# Patient Record
Sex: MALE | Race: WHITE | NOT HISPANIC OR LATINO | Employment: OTHER | URBAN - METROPOLITAN AREA
[De-identification: names, ages, dates, MRNs, and addresses within clinical notes are randomized per-mention and may not be internally consistent; named-entity substitution may affect disease eponyms.]

---

## 2017-06-06 ENCOUNTER — APPOINTMENT (OUTPATIENT)
Dept: LAB | Age: 77
End: 2017-06-06
Payer: COMMERCIAL

## 2017-06-06 ENCOUNTER — TRANSCRIBE ORDERS (OUTPATIENT)
Dept: ADMINISTRATIVE | Age: 77
End: 2017-06-06

## 2017-06-06 DIAGNOSIS — C61 MALIGNANT NEOPLASM OF PROSTATE (HCC): Primary | ICD-10-CM

## 2017-06-06 DIAGNOSIS — C61 MALIGNANT NEOPLASM OF PROSTATE (HCC): ICD-10-CM

## 2017-06-06 LAB — PSA SERPL-MCNC: <0.1 NG/ML (ref 0–4)

## 2017-06-06 PROCEDURE — 84153 ASSAY OF PSA TOTAL: CPT

## 2017-06-20 ENCOUNTER — ALLSCRIPTS OFFICE VISIT (OUTPATIENT)
Dept: OTHER | Facility: OTHER | Age: 77
End: 2017-06-20

## 2017-06-20 DIAGNOSIS — E78.00 PURE HYPERCHOLESTEROLEMIA: ICD-10-CM

## 2017-06-20 DIAGNOSIS — I10 ESSENTIAL (PRIMARY) HYPERTENSION: ICD-10-CM

## 2017-12-05 ENCOUNTER — TRANSCRIBE ORDERS (OUTPATIENT)
Dept: ADMINISTRATIVE | Age: 77
End: 2017-12-05

## 2017-12-05 ENCOUNTER — APPOINTMENT (OUTPATIENT)
Dept: LAB | Age: 77
End: 2017-12-05
Payer: COMMERCIAL

## 2017-12-05 DIAGNOSIS — E78.00 PURE HYPERCHOLESTEROLEMIA: ICD-10-CM

## 2017-12-05 DIAGNOSIS — I10 ESSENTIAL (PRIMARY) HYPERTENSION: ICD-10-CM

## 2017-12-05 LAB
BASOPHILS # BLD AUTO: 0.04 THOUSANDS/ΜL (ref 0–0.1)
BASOPHILS NFR BLD AUTO: 0 % (ref 0–1)
CHOLEST SERPL-MCNC: 162 MG/DL (ref 50–200)
EOSINOPHIL # BLD AUTO: 0.59 THOUSAND/ΜL (ref 0–0.61)
EOSINOPHIL NFR BLD AUTO: 5 % (ref 0–6)
ERYTHROCYTE [DISTWIDTH] IN BLOOD BY AUTOMATED COUNT: 14 % (ref 11.6–15.1)
HCT VFR BLD AUTO: 46.2 % (ref 36.5–49.3)
HDLC SERPL-MCNC: 46 MG/DL (ref 40–60)
HGB BLD-MCNC: 15.5 G/DL (ref 12–17)
LDLC SERPL CALC-MCNC: 80 MG/DL (ref 0–100)
LYMPHOCYTES # BLD AUTO: 2.6 THOUSANDS/ΜL (ref 0.6–4.47)
LYMPHOCYTES NFR BLD AUTO: 23 % (ref 14–44)
MCH RBC QN AUTO: 30.8 PG (ref 26.8–34.3)
MCHC RBC AUTO-ENTMCNC: 33.5 G/DL (ref 31.4–37.4)
MCV RBC AUTO: 92 FL (ref 82–98)
MONOCYTES # BLD AUTO: 0.91 THOUSAND/ΜL (ref 0.17–1.22)
MONOCYTES NFR BLD AUTO: 8 % (ref 4–12)
NEUTROPHILS # BLD AUTO: 7.06 THOUSANDS/ΜL (ref 1.85–7.62)
NEUTS SEG NFR BLD AUTO: 64 % (ref 43–75)
NRBC BLD AUTO-RTO: 1 /100 WBCS
PLATELET # BLD AUTO: 171 THOUSANDS/UL (ref 149–390)
PMV BLD AUTO: 11 FL (ref 8.9–12.7)
RBC # BLD AUTO: 5.04 MILLION/UL (ref 3.88–5.62)
TRIGL SERPL-MCNC: 181 MG/DL
WBC # BLD AUTO: 11.27 THOUSAND/UL (ref 4.31–10.16)

## 2017-12-05 PROCEDURE — 85025 COMPLETE CBC W/AUTO DIFF WBC: CPT

## 2017-12-05 PROCEDURE — 36415 COLL VENOUS BLD VENIPUNCTURE: CPT

## 2017-12-05 PROCEDURE — 80061 LIPID PANEL: CPT

## 2017-12-18 ENCOUNTER — ALLSCRIPTS OFFICE VISIT (OUTPATIENT)
Dept: OTHER | Facility: OTHER | Age: 77
End: 2017-12-18

## 2018-01-13 VITALS
WEIGHT: 162 LBS | DIASTOLIC BLOOD PRESSURE: 82 MMHG | HEIGHT: 65 IN | HEART RATE: 74 BPM | SYSTOLIC BLOOD PRESSURE: 140 MMHG | BODY MASS INDEX: 26.99 KG/M2 | TEMPERATURE: 98.1 F | RESPIRATION RATE: 14 BRPM | OXYGEN SATURATION: 98 %

## 2018-01-22 VITALS
SYSTOLIC BLOOD PRESSURE: 142 MMHG | WEIGHT: 161 LBS | HEART RATE: 78 BPM | TEMPERATURE: 98 F | HEIGHT: 65 IN | BODY MASS INDEX: 26.82 KG/M2 | OXYGEN SATURATION: 96 % | DIASTOLIC BLOOD PRESSURE: 78 MMHG

## 2018-06-12 ENCOUNTER — APPOINTMENT (OUTPATIENT)
Dept: LAB | Age: 78
End: 2018-06-12
Payer: COMMERCIAL

## 2018-06-12 ENCOUNTER — TRANSCRIBE ORDERS (OUTPATIENT)
Dept: ADMINISTRATIVE | Age: 78
End: 2018-06-12

## 2018-06-12 DIAGNOSIS — C61 MALIGNANT NEOPLASM OF PROSTATE (HCC): Primary | ICD-10-CM

## 2018-06-12 DIAGNOSIS — C61 MALIGNANT NEOPLASM OF PROSTATE (HCC): ICD-10-CM

## 2018-06-12 LAB — PSA SERPL-MCNC: <0.1 NG/ML (ref 0–4)

## 2018-06-12 PROCEDURE — 84153 ASSAY OF PSA TOTAL: CPT

## 2018-06-25 ENCOUNTER — OFFICE VISIT (OUTPATIENT)
Dept: FAMILY MEDICINE CLINIC | Facility: CLINIC | Age: 78
End: 2018-06-25
Payer: COMMERCIAL

## 2018-06-25 VITALS
HEIGHT: 65 IN | SYSTOLIC BLOOD PRESSURE: 142 MMHG | TEMPERATURE: 98.4 F | BODY MASS INDEX: 27.46 KG/M2 | HEART RATE: 71 BPM | WEIGHT: 164.8 LBS | DIASTOLIC BLOOD PRESSURE: 78 MMHG | OXYGEN SATURATION: 98 %

## 2018-06-25 DIAGNOSIS — E55.9 VITAMIN D DEFICIENCY: ICD-10-CM

## 2018-06-25 DIAGNOSIS — E78.00 HYPERCHOLESTEREMIA: ICD-10-CM

## 2018-06-25 DIAGNOSIS — I10 ESSENTIAL HYPERTENSION: Primary | ICD-10-CM

## 2018-06-25 DIAGNOSIS — C61 PROSTATE CANCER (HCC): ICD-10-CM

## 2018-06-25 DIAGNOSIS — I10 ESSENTIAL HYPERTENSION: ICD-10-CM

## 2018-06-25 PROCEDURE — 99214 OFFICE O/P EST MOD 30 MIN: CPT | Performed by: FAMILY MEDICINE

## 2018-06-25 PROCEDURE — 3008F BODY MASS INDEX DOCD: CPT | Performed by: FAMILY MEDICINE

## 2018-06-25 PROCEDURE — 1101F PT FALLS ASSESS-DOCD LE1/YR: CPT | Performed by: FAMILY MEDICINE

## 2018-06-25 PROCEDURE — 1160F RVW MEDS BY RX/DR IN RCRD: CPT | Performed by: FAMILY MEDICINE

## 2018-06-25 RX ORDER — ATENOLOL 25 MG/1
12.5 TABLET ORAL DAILY
Qty: 45 TABLET | Refills: 1 | Status: SHIPPED | OUTPATIENT
Start: 2018-06-25 | End: 2018-06-25 | Stop reason: SDUPTHER

## 2018-06-25 RX ORDER — ATENOLOL 25 MG/1
12.5 TABLET ORAL DAILY
Qty: 90 TABLET | Refills: 0 | Status: SHIPPED | OUTPATIENT
Start: 2018-06-25 | End: 2018-12-19 | Stop reason: SDUPTHER

## 2018-06-25 RX ORDER — LOSARTAN POTASSIUM 50 MG/1
50 TABLET ORAL DAILY
Qty: 90 TABLET | Refills: 1 | Status: SHIPPED | OUTPATIENT
Start: 2018-06-25 | End: 2018-12-19 | Stop reason: SDUPTHER

## 2018-06-25 RX ORDER — LOSARTAN POTASSIUM 50 MG/1
50 TABLET ORAL DAILY
Qty: 90 TABLET | Refills: 1 | Status: SHIPPED | OUTPATIENT
Start: 2018-06-25 | End: 2018-06-25 | Stop reason: SDUPTHER

## 2018-06-25 NOTE — PATIENT INSTRUCTIONS
Fasting labs before next visit  Discussed activity level and hydration  Encouraged patient on writing his book

## 2018-06-25 NOTE — PROGRESS NOTES
Assessment/Plan: patient here today for follow up for hypertension   Pt would like to talk about aspirin     No problem-specific Assessment & Plan notes found for this encounter  Diagnoses and all orders for this visit:    Essential hypertension  -     losartan (COZAAR) 50 mg tablet; Take 1 tablet (50 mg total) by mouth daily  -     atenolol (TENORMIN) 25 mg tablet; Take 0 5 tablets (12 5 mg total) by mouth daily  -     Basic metabolic panel; Future  -     CBC and Platelet; Future  -     Hepatic function panel; Future    Hypercholesteremia  -     Lipid panel; Future    Vitamin D deficiency  -     Vitamin D 25 hydroxy; Future    Prostate cancer ([facility] [address] )    Other orders  -     aspirin 81 MG tablet; Take by mouth            Subjective:      Patient ID: Karolina Neil is a 77 y o  male  Follow up  No complaints  Seeing Urology this week  The following portions of the patient's history were reviewed and updated as appropriate: allergies, current medications, past family history, past medical history, past social history, past surgical history and problem list     Review of Systems   Constitutional: Negative  HENT: Negative  Eyes: Negative  Respiratory: Negative  Cardiovascular: Negative  Gastrointestinal: Negative  Genitourinary: Negative  Musculoskeletal: Negative  Skin: Negative  Neurological: Negative  Psychiatric/Behavioral: Negative  Objective:      /78 (BP Location: Left arm, Patient Position: Sitting, Cuff Size: Standard)   Pulse 71   Temp 98 4 °F (36 9 °C) (Oral)   Ht 5' 5" (1 651 m)   Wt 74 8 kg (164 lb 12 8 oz)   SpO2 98%   BMI 27 42 kg/m²          Physical Exam   Constitutional: He is oriented to person, place, and time  He appears well-developed and well-nourished  HENT:   Head: Normocephalic and atraumatic     Right Ear: External ear normal    Left Ear: External ear normal    Nose: Nose normal    Mouth/Throat: Oropharynx is clear and moist  Eyes: Conjunctivae and EOM are normal  Pupils are equal, round, and reactive to light  Neck: Normal range of motion  Neck supple  Cardiovascular: Normal rate, regular rhythm, normal heart sounds and intact distal pulses  Pulmonary/Chest: Effort normal and breath sounds normal    Abdominal: Soft  Bowel sounds are normal    Neurological: He is alert and oriented to person, place, and time  He has normal reflexes  Skin: Skin is warm and dry  Psychiatric: He has a normal mood and affect   His behavior is normal  Judgment and thought content normal

## 2018-06-27 ENCOUNTER — OFFICE VISIT (OUTPATIENT)
Dept: UROLOGY | Facility: CLINIC | Age: 78
End: 2018-06-27
Payer: COMMERCIAL

## 2018-06-27 VITALS
DIASTOLIC BLOOD PRESSURE: 66 MMHG | HEIGHT: 65 IN | BODY MASS INDEX: 27.32 KG/M2 | WEIGHT: 164 LBS | SYSTOLIC BLOOD PRESSURE: 126 MMHG

## 2018-06-27 DIAGNOSIS — Z85.46 PERSONAL HISTORY OF MALIGNANT NEOPLASM OF PROSTATE: Primary | ICD-10-CM

## 2018-06-27 LAB
SL AMB  POCT GLUCOSE, UA: NORMAL
SL AMB LEUKOCYTE ESTERASE,UA: NORMAL
SL AMB POCT BILIRUBIN,UA: NORMAL
SL AMB POCT BLOOD,UA: NORMAL
SL AMB POCT CLARITY,UA: CLEAR
SL AMB POCT COLOR,UA: YELLOW
SL AMB POCT KETONES,UA: NORMAL
SL AMB POCT NITRITE,UA: NORMAL
SL AMB POCT PH,UA: 5
SL AMB POCT SPECIFIC GRAVITY,UA: NORMAL
SL AMB POCT URINE PROTEIN: NORMAL
SL AMB POCT UROBILINOGEN: NORMAL

## 2018-06-27 PROCEDURE — 99213 OFFICE O/P EST LOW 20 MIN: CPT | Performed by: PHYSICIAN ASSISTANT

## 2018-06-27 PROCEDURE — 81002 URINALYSIS NONAUTO W/O SCOPE: CPT | Performed by: PHYSICIAN ASSISTANT

## 2018-06-27 NOTE — PROGRESS NOTES
UROLOGY PROGRESS NOTE   Patient Identifiers: Shayla Garcia (MRN [id number])  Date of Service: 6/27/2018    Subjective:      50-year-old male with history of Christ 7 stage II prostate cancer  He had a DA Matthew prostatectomy in 2014  His PSA  Is < 0 1  He has  good urinary control but still wears a pad for protection  Patient has  no complaints  Objective:     VITALS:      AUA SYMPTOM SCORE      Most Recent Value   AUA SYMPTOM SCORE   How often have you had a sensation of not emptying your bladder completely after you finished urinating? 0   How often have you had to urinate again less than two hours after you finished urinating? 0   How often have you found you stopped and started again several times when you urinate?  0   How often have you found it difficult to postpone urination? 0   How often have you had a weak urinary stream?  0   How often have you had to push or strain to begin urination?   0   How many times did you most typically get up to urinate from the time you went to bed at night until the time you got up in the morning?  0   Quality of Life: If you were to spend the rest of your life with your urinary condition just the way it is now, how would you feel about that?  --   AUA SYMPTOM SCORE  0            LABS:  Lab Results   Component Value Date    HGB 15 5 12/05/2017    HCT 46 2 12/05/2017    WBC 11 27 (H) 12/05/2017     12/05/2017   ]    Lab Results   Component Value Date     12/09/2016    K 4 1 12/09/2016    CL 99 12/09/2016    CO2 27 12/09/2016    BUN 24 12/09/2016    CREATININE 1 10 12/09/2016    CALCIUM 9 0 12/09/2016    GLUCOSE 84 12/04/2016   ]    INPATIENT MEDS:    Current Outpatient Prescriptions:     aspirin 81 MG tablet, Take by mouth  , Disp: , Rfl:     atenolol (TENORMIN) 25 mg tablet, Take 0 5 tablets (12 5 mg total) by mouth daily, Disp: 90 tablet, Rfl: 0    bimatoprost (LUMIGAN) 0 01 % ophthalmic drops, Administer 1 drop to both eyes daily at bedtime, Disp: , Rfl:     brinzolamide (AZOPT) 1 % ophthalmic suspension, Administer 1 drop to both eyes 2 (two) times a day, Disp: , Rfl:     losartan (COZAAR) 50 mg tablet, Take 1 tablet (50 mg total) by mouth daily, Disp: 90 tablet, Rfl: 1    multivitamin (THERAGRAN) TABS, Take 1 tablet by mouth daily, Disp: , Rfl:     simvastatin (ZOCOR) 20 mg tablet, Take 20 mg by mouth daily at bedtime, Disp: , Rfl:       Physical Exam:   There were no vitals taken for this visit  GEN: no acute distress    RESP: breathing comfortably with no accessory muscle use    ABD: soft, non-tender, non-distended   INCISION:    EXT: no significant peripheral edema   (Male): Penis circumcised, phallus normal, meatus patent  Testicles descended into scrotum bilaterally without masses nor tenderness  No inguinal hernias bilaterally  ELISHA: Prostate is   Surgically absent with no rectal masses  RADIOLOGY:    none     Assessment:    1  Prostate cancer     Plan:   - no evidence of recurrent disease   - follow-up  In 1 year with a PSA prior to visit    -  -

## 2018-07-30 RX ORDER — SIMVASTATIN 20 MG
TABLET ORAL
Qty: 90 TABLET | Refills: 1 | OUTPATIENT
Start: 2018-07-30

## 2018-10-29 DIAGNOSIS — E78.00 HYPERCHOLESTEREMIA: Primary | ICD-10-CM

## 2018-10-29 RX ORDER — SIMVASTATIN 20 MG
20 TABLET ORAL
Qty: 90 TABLET | Refills: 0 | Status: SHIPPED | OUTPATIENT
Start: 2018-10-29 | End: 2018-12-19 | Stop reason: SDUPTHER

## 2018-12-04 ENCOUNTER — APPOINTMENT (OUTPATIENT)
Dept: LAB | Age: 78
End: 2018-12-04
Payer: COMMERCIAL

## 2018-12-04 ENCOUNTER — TRANSCRIBE ORDERS (OUTPATIENT)
Dept: ADMINISTRATIVE | Age: 78
End: 2018-12-04

## 2018-12-04 DIAGNOSIS — E55.9 VITAMIN D DEFICIENCY: ICD-10-CM

## 2018-12-04 DIAGNOSIS — E78.00 HYPERCHOLESTEREMIA: ICD-10-CM

## 2018-12-04 DIAGNOSIS — I10 ESSENTIAL HYPERTENSION: ICD-10-CM

## 2018-12-04 LAB
25(OH)D3 SERPL-MCNC: 26.2 NG/ML (ref 30–100)
ALBUMIN SERPL BCP-MCNC: 3.9 G/DL (ref 3.5–5)
ALP SERPL-CCNC: 80 U/L (ref 46–116)
ALT SERPL W P-5'-P-CCNC: 32 U/L (ref 12–78)
ANION GAP SERPL CALCULATED.3IONS-SCNC: 4 MMOL/L (ref 4–13)
AST SERPL W P-5'-P-CCNC: 24 U/L (ref 5–45)
BILIRUB DIRECT SERPL-MCNC: 0.17 MG/DL (ref 0–0.2)
BILIRUB SERPL-MCNC: 0.58 MG/DL (ref 0.2–1)
BUN SERPL-MCNC: 22 MG/DL (ref 5–25)
CALCIUM SERPL-MCNC: 9.5 MG/DL (ref 8.3–10.1)
CHLORIDE SERPL-SCNC: 103 MMOL/L (ref 100–108)
CHOLEST SERPL-MCNC: 175 MG/DL (ref 50–200)
CO2 SERPL-SCNC: 31 MMOL/L (ref 21–32)
CREAT SERPL-MCNC: 1.09 MG/DL (ref 0.6–1.3)
ERYTHROCYTE [DISTWIDTH] IN BLOOD BY AUTOMATED COUNT: 13.6 % (ref 11.6–15.1)
GFR SERPL CREATININE-BSD FRML MDRD: 64 ML/MIN/1.73SQ M
GLUCOSE P FAST SERPL-MCNC: 96 MG/DL (ref 65–99)
HCT VFR BLD AUTO: 49.3 % (ref 36.5–49.3)
HDLC SERPL-MCNC: 42 MG/DL (ref 40–60)
HGB BLD-MCNC: 15.9 G/DL (ref 12–17)
LDLC SERPL CALC-MCNC: 86 MG/DL (ref 0–100)
MCH RBC QN AUTO: 30.1 PG (ref 26.8–34.3)
MCHC RBC AUTO-ENTMCNC: 32.3 G/DL (ref 31.4–37.4)
MCV RBC AUTO: 93 FL (ref 82–98)
NONHDLC SERPL-MCNC: 133 MG/DL
PLATELET # BLD AUTO: 179 THOUSANDS/UL (ref 149–390)
PMV BLD AUTO: 10.3 FL (ref 8.9–12.7)
POTASSIUM SERPL-SCNC: 4.2 MMOL/L (ref 3.5–5.3)
PROT SERPL-MCNC: 7.6 G/DL (ref 6.4–8.2)
RBC # BLD AUTO: 5.29 MILLION/UL (ref 3.88–5.62)
SODIUM SERPL-SCNC: 138 MMOL/L (ref 136–145)
TRIGL SERPL-MCNC: 234 MG/DL
WBC # BLD AUTO: 11.06 THOUSAND/UL (ref 4.31–10.16)

## 2018-12-04 PROCEDURE — 36415 COLL VENOUS BLD VENIPUNCTURE: CPT

## 2018-12-04 PROCEDURE — 80076 HEPATIC FUNCTION PANEL: CPT

## 2018-12-04 PROCEDURE — 82306 VITAMIN D 25 HYDROXY: CPT

## 2018-12-04 PROCEDURE — 80061 LIPID PANEL: CPT

## 2018-12-04 PROCEDURE — 85027 COMPLETE CBC AUTOMATED: CPT

## 2018-12-04 PROCEDURE — 80048 BASIC METABOLIC PNL TOTAL CA: CPT

## 2018-12-19 ENCOUNTER — OFFICE VISIT (OUTPATIENT)
Dept: FAMILY MEDICINE CLINIC | Facility: CLINIC | Age: 78
End: 2018-12-19
Payer: COMMERCIAL

## 2018-12-19 VITALS
DIASTOLIC BLOOD PRESSURE: 74 MMHG | BODY MASS INDEX: 27.46 KG/M2 | TEMPERATURE: 97.9 F | SYSTOLIC BLOOD PRESSURE: 126 MMHG | HEIGHT: 65 IN | WEIGHT: 164.8 LBS | HEART RATE: 79 BPM | OXYGEN SATURATION: 96 %

## 2018-12-19 DIAGNOSIS — I10 ESSENTIAL HYPERTENSION: ICD-10-CM

## 2018-12-19 DIAGNOSIS — E55.9 VITAMIN D DEFICIENCY: ICD-10-CM

## 2018-12-19 DIAGNOSIS — E78.1 HYPERTRIGLYCERIDEMIA: Primary | ICD-10-CM

## 2018-12-19 DIAGNOSIS — E78.00 HYPERCHOLESTEREMIA: ICD-10-CM

## 2018-12-19 DIAGNOSIS — C61 PROSTATE CANCER (HCC): ICD-10-CM

## 2018-12-19 PROCEDURE — 99214 OFFICE O/P EST MOD 30 MIN: CPT | Performed by: FAMILY MEDICINE

## 2018-12-19 RX ORDER — LOSARTAN POTASSIUM 50 MG/1
50 TABLET ORAL DAILY
Qty: 90 TABLET | Refills: 1 | Status: SHIPPED | OUTPATIENT
Start: 2018-12-19 | End: 2019-01-30 | Stop reason: SDUPTHER

## 2018-12-19 RX ORDER — ATENOLOL 25 MG/1
12.5 TABLET ORAL DAILY
Qty: 90 TABLET | Refills: 1 | Status: SHIPPED | OUTPATIENT
Start: 2018-12-19 | End: 2019-01-30 | Stop reason: SDUPTHER

## 2018-12-19 RX ORDER — SIMVASTATIN 20 MG
20 TABLET ORAL
Qty: 90 TABLET | Refills: 1 | Status: SHIPPED | OUTPATIENT
Start: 2018-12-19 | End: 2019-01-30 | Stop reason: SDUPTHER

## 2018-12-19 NOTE — PROGRESS NOTES
Assessment/Plan: patient here today for follow up for hypertension and to review BW    Pt will like to talk about pneumonia vaccine     No problem-specific Assessment & Plan notes found for this encounter  Diagnoses and all orders for this visit:    Hypertriglyceridemia    Hypercholesteremia  -     simvastatin (ZOCOR) 20 mg tablet; Take 1 tablet (20 mg total) by mouth daily at bedtime for 180 days    Essential hypertension  -     losartan (COZAAR) 50 mg tablet; Take 1 tablet (50 mg total) by mouth daily for 180 days  -     atenolol (TENORMIN) 25 mg tablet; Take 0 5 tablets (12 5 mg total) by mouth daily for 180 days    Prostate cancer ([facility] [address] )    Vitamin D deficiency          Subjective:      Patient ID: Tiago Carolina is a 77 y o  male  Follow up  Doing well  [facility]  No new skin lesions, follows with plastics yearly  Urination good  PSA <1  Follows with urology yearly          The following portions of the patient's history were reviewed and updated as appropriate: allergies, current medications, past family history, past medical history, past social history, past surgical history and problem list     Current Outpatient Prescriptions:     aspirin 81 MG tablet, Take by mouth  , Disp: , Rfl:     atenolol (TENORMIN) 25 mg tablet, Take 0 5 tablets (12 5 mg total) by mouth daily for 180 days, Disp: 90 tablet, Rfl: 1    bimatoprost (LUMIGAN) 0 01 % ophthalmic drops, Administer 1 drop to both eyes daily at bedtime, Disp: , Rfl:     brinzolamide (AZOPT) 1 % ophthalmic suspension, Administer 1 drop to both eyes 2 (two) times a day, Disp: , Rfl:     losartan (COZAAR) 50 mg tablet, Take 1 tablet (50 mg total) by mouth daily for 180 days, Disp: 90 tablet, Rfl: 1    multivitamin (THERAGRAN) TABS, Take 1 tablet by mouth daily, Disp: , Rfl:     simvastatin (ZOCOR) 20 mg tablet, Take 1 tablet (20 mg total) by mouth daily at bedtime for 180 days, Disp: 90 tablet, Rfl: 1     Review of Systems Constitutional: Negative  HENT: Negative  Eyes: Negative  Respiratory: Negative  Cardiovascular: Negative  Gastrointestinal: Negative  Genitourinary: Negative  Musculoskeletal: Negative  Skin: Negative  Neurological: Negative  Psychiatric/Behavioral: Negative  Objective:      /74 (BP Location: Left arm, Patient Position: Sitting, Cuff Size: Standard)   Pulse 79   Temp 97 9 °F (36 6 °C) (Oral)   Ht 5' 5" (1 651 m)   Wt 74 8 kg (164 lb 12 8 oz)   SpO2 96%   BMI 27 42 kg/m²          Physical Exam   Constitutional: He is oriented to person, place, and time  He appears well-developed and well-nourished  HENT:   Head: Normocephalic and atraumatic  Right Ear: External ear normal    Left Ear: External ear normal    Nose: Nose normal    Mouth/Throat: Oropharynx is clear and moist    Eyes: Pupils are equal, round, and reactive to light  Conjunctivae and EOM are normal    Neck: Normal range of motion  Neck supple  Cardiovascular: Normal rate, regular rhythm, normal heart sounds and intact distal pulses  Pulmonary/Chest: Effort normal and breath sounds normal    Abdominal: Soft  Bowel sounds are normal    Neurological: He is alert and oriented to person, place, and time  Skin: Skin is warm and dry  Psychiatric: He has a normal mood and affect   His behavior is normal  Judgment and thought content normal

## 2019-01-27 DIAGNOSIS — E78.00 HYPERCHOLESTEREMIA: ICD-10-CM

## 2019-01-28 RX ORDER — SIMVASTATIN 20 MG
TABLET ORAL
Qty: 90 TABLET | Refills: 0 | OUTPATIENT
Start: 2019-01-28

## 2019-01-30 DIAGNOSIS — I10 ESSENTIAL HYPERTENSION: ICD-10-CM

## 2019-01-30 DIAGNOSIS — E78.00 HYPERCHOLESTEREMIA: ICD-10-CM

## 2019-01-30 RX ORDER — ATENOLOL 25 MG/1
12.5 TABLET ORAL DAILY
Qty: 45 TABLET | Refills: 1 | Status: SHIPPED | OUTPATIENT
Start: 2019-01-30 | End: 2019-10-15 | Stop reason: SDUPTHER

## 2019-01-30 RX ORDER — LOSARTAN POTASSIUM 50 MG/1
50 TABLET ORAL DAILY
Qty: 90 TABLET | Refills: 1 | Status: SHIPPED | OUTPATIENT
Start: 2019-01-30 | End: 2019-10-15 | Stop reason: ALTCHOICE

## 2019-01-30 RX ORDER — SIMVASTATIN 20 MG
20 TABLET ORAL
Qty: 90 TABLET | Refills: 1 | Status: SHIPPED | OUTPATIENT
Start: 2019-01-30 | End: 2019-06-10

## 2019-04-01 ENCOUNTER — OFFICE VISIT (OUTPATIENT)
Dept: FAMILY MEDICINE CLINIC | Facility: CLINIC | Age: 79
End: 2019-04-01
Payer: COMMERCIAL

## 2019-04-01 VITALS
HEIGHT: 65 IN | TEMPERATURE: 97.7 F | SYSTOLIC BLOOD PRESSURE: 118 MMHG | WEIGHT: 168.5 LBS | RESPIRATION RATE: 16 BRPM | DIASTOLIC BLOOD PRESSURE: 60 MMHG | OXYGEN SATURATION: 98 % | BODY MASS INDEX: 28.07 KG/M2 | HEART RATE: 79 BPM

## 2019-04-01 DIAGNOSIS — L85.3 DRY SKIN: ICD-10-CM

## 2019-04-01 DIAGNOSIS — L85.3 DRY SKIN DERMATITIS: Primary | ICD-10-CM

## 2019-04-01 DIAGNOSIS — T14.8XXA SKIN ABRASION: ICD-10-CM

## 2019-04-01 PROCEDURE — 1036F TOBACCO NON-USER: CPT | Performed by: FAMILY MEDICINE

## 2019-04-01 PROCEDURE — 1160F RVW MEDS BY RX/DR IN RCRD: CPT | Performed by: FAMILY MEDICINE

## 2019-04-01 PROCEDURE — 99214 OFFICE O/P EST MOD 30 MIN: CPT | Performed by: FAMILY MEDICINE

## 2019-04-01 RX ORDER — TRIAMCINOLONE ACETONIDE 5 MG/G
CREAM TOPICAL 3 TIMES DAILY
Qty: 30 G | Refills: 1 | Status: SHIPPED | OUTPATIENT
Start: 2019-04-01 | End: 2019-10-08 | Stop reason: ALTCHOICE

## 2019-05-20 ENCOUNTER — OFFICE VISIT (OUTPATIENT)
Dept: FAMILY MEDICINE CLINIC | Facility: CLINIC | Age: 79
End: 2019-05-20
Payer: COMMERCIAL

## 2019-05-20 VITALS
TEMPERATURE: 98 F | BODY MASS INDEX: 28.66 KG/M2 | HEIGHT: 65 IN | WEIGHT: 172 LBS | SYSTOLIC BLOOD PRESSURE: 124 MMHG | OXYGEN SATURATION: 98 % | HEART RATE: 73 BPM | DIASTOLIC BLOOD PRESSURE: 76 MMHG

## 2019-05-20 DIAGNOSIS — I10 ESSENTIAL HYPERTENSION: ICD-10-CM

## 2019-05-20 DIAGNOSIS — T14.8XXA SKIN ABRASION: ICD-10-CM

## 2019-05-20 DIAGNOSIS — L03.115 CELLULITIS OF RIGHT LOWER EXTREMITY: ICD-10-CM

## 2019-05-20 DIAGNOSIS — T14.8XXA ABRASION: Primary | ICD-10-CM

## 2019-05-20 PROCEDURE — 99213 OFFICE O/P EST LOW 20 MIN: CPT | Performed by: FAMILY MEDICINE

## 2019-05-20 RX ORDER — CEPHALEXIN 250 MG/1
250 CAPSULE ORAL 4 TIMES DAILY
Qty: 28 CAPSULE | Refills: 0 | Status: SHIPPED | OUTPATIENT
Start: 2019-05-20 | End: 2019-05-28

## 2019-05-28 ENCOUNTER — OFFICE VISIT (OUTPATIENT)
Dept: FAMILY MEDICINE CLINIC | Facility: CLINIC | Age: 79
End: 2019-05-28
Payer: COMMERCIAL

## 2019-05-28 VITALS
WEIGHT: 168.8 LBS | TEMPERATURE: 97.9 F | BODY MASS INDEX: 28.12 KG/M2 | SYSTOLIC BLOOD PRESSURE: 126 MMHG | OXYGEN SATURATION: 98 % | HEIGHT: 65 IN | HEART RATE: 65 BPM | DIASTOLIC BLOOD PRESSURE: 76 MMHG

## 2019-05-28 DIAGNOSIS — I10 ESSENTIAL HYPERTENSION: ICD-10-CM

## 2019-05-28 DIAGNOSIS — S90.511D ABRASION OF RIGHT ANKLE, SUBSEQUENT ENCOUNTER: Primary | ICD-10-CM

## 2019-05-28 DIAGNOSIS — E78.00 HYPERCHOLESTEREMIA: ICD-10-CM

## 2019-05-28 DIAGNOSIS — L03.119 CELLULITIS OF LOWER LEG: ICD-10-CM

## 2019-05-28 PROCEDURE — 99214 OFFICE O/P EST MOD 30 MIN: CPT | Performed by: FAMILY MEDICINE

## 2019-05-28 PROCEDURE — 3074F SYST BP LT 130 MM HG: CPT | Performed by: FAMILY MEDICINE

## 2019-05-28 PROCEDURE — 3078F DIAST BP <80 MM HG: CPT | Performed by: FAMILY MEDICINE

## 2019-05-28 RX ORDER — TRAMADOL HYDROCHLORIDE 50 MG/1
50 TABLET ORAL EVERY 8 HOURS PRN
Qty: 30 TABLET | Refills: 0 | Status: SHIPPED | OUTPATIENT
Start: 2019-05-28 | End: 2019-07-29 | Stop reason: SDUPTHER

## 2019-06-10 ENCOUNTER — OFFICE VISIT (OUTPATIENT)
Dept: FAMILY MEDICINE CLINIC | Facility: CLINIC | Age: 79
End: 2019-06-10
Payer: COMMERCIAL

## 2019-06-10 ENCOUNTER — APPOINTMENT (OUTPATIENT)
Dept: LAB | Facility: CLINIC | Age: 79
End: 2019-06-10
Payer: COMMERCIAL

## 2019-06-10 ENCOUNTER — TRANSCRIBE ORDERS (OUTPATIENT)
Dept: LAB | Facility: CLINIC | Age: 79
End: 2019-06-10

## 2019-06-10 VITALS
HEIGHT: 65 IN | HEART RATE: 64 BPM | TEMPERATURE: 97.8 F | OXYGEN SATURATION: 98 % | DIASTOLIC BLOOD PRESSURE: 74 MMHG | SYSTOLIC BLOOD PRESSURE: 126 MMHG | BODY MASS INDEX: 27.92 KG/M2 | WEIGHT: 167.6 LBS

## 2019-06-10 DIAGNOSIS — M79.671 PAIN IN BOTH FEET: ICD-10-CM

## 2019-06-10 DIAGNOSIS — Z85.46 PERSONAL HISTORY OF MALIGNANT NEOPLASM OF PROSTATE: ICD-10-CM

## 2019-06-10 DIAGNOSIS — S90.511D ABRASION OF RIGHT ANKLE, SUBSEQUENT ENCOUNTER: Primary | ICD-10-CM

## 2019-06-10 DIAGNOSIS — L85.3 DRY SKIN: ICD-10-CM

## 2019-06-10 DIAGNOSIS — M79.672 PAIN IN BOTH FEET: ICD-10-CM

## 2019-06-10 LAB — PSA SERPL-MCNC: <0.1 NG/ML (ref 0–4)

## 2019-06-10 PROCEDURE — 1160F RVW MEDS BY RX/DR IN RCRD: CPT | Performed by: FAMILY MEDICINE

## 2019-06-10 PROCEDURE — 99213 OFFICE O/P EST LOW 20 MIN: CPT | Performed by: FAMILY MEDICINE

## 2019-06-10 PROCEDURE — 84153 ASSAY OF PSA TOTAL: CPT

## 2019-06-10 RX ORDER — SIMVASTATIN 20 MG
20 TABLET ORAL
COMMUNITY
End: 2019-07-31 | Stop reason: SDUPTHER

## 2019-06-27 ENCOUNTER — OFFICE VISIT (OUTPATIENT)
Dept: UROLOGY | Facility: CLINIC | Age: 79
End: 2019-06-27
Payer: COMMERCIAL

## 2019-06-27 VITALS
HEIGHT: 65 IN | DIASTOLIC BLOOD PRESSURE: 68 MMHG | WEIGHT: 165 LBS | HEART RATE: 72 BPM | SYSTOLIC BLOOD PRESSURE: 126 MMHG | BODY MASS INDEX: 27.49 KG/M2

## 2019-06-27 DIAGNOSIS — C61 PROSTATE CANCER (HCC): Primary | ICD-10-CM

## 2019-06-27 PROCEDURE — 99213 OFFICE O/P EST LOW 20 MIN: CPT | Performed by: PHYSICIAN ASSISTANT

## 2019-07-29 ENCOUNTER — OFFICE VISIT (OUTPATIENT)
Dept: FAMILY MEDICINE CLINIC | Facility: CLINIC | Age: 79
End: 2019-07-29
Payer: COMMERCIAL

## 2019-07-29 VITALS
TEMPERATURE: 98.2 F | DIASTOLIC BLOOD PRESSURE: 66 MMHG | OXYGEN SATURATION: 98 % | HEART RATE: 70 BPM | WEIGHT: 163.4 LBS | SYSTOLIC BLOOD PRESSURE: 128 MMHG | HEIGHT: 65 IN | BODY MASS INDEX: 27.22 KG/M2

## 2019-07-29 DIAGNOSIS — M25.571 ACUTE RIGHT ANKLE PAIN: ICD-10-CM

## 2019-07-29 DIAGNOSIS — E78.00 HYPERCHOLESTEREMIA: ICD-10-CM

## 2019-07-29 DIAGNOSIS — I73.9 CLAUDICATION OF BOTH LOWER EXTREMITIES (HCC): ICD-10-CM

## 2019-07-29 DIAGNOSIS — S91.001S ANKLE WOUND, RIGHT, SEQUELA: Primary | ICD-10-CM

## 2019-07-29 PROCEDURE — 1160F RVW MEDS BY RX/DR IN RCRD: CPT | Performed by: FAMILY MEDICINE

## 2019-07-29 PROCEDURE — 1036F TOBACCO NON-USER: CPT | Performed by: FAMILY MEDICINE

## 2019-07-29 PROCEDURE — 99213 OFFICE O/P EST LOW 20 MIN: CPT | Performed by: FAMILY MEDICINE

## 2019-07-29 PROCEDURE — 1101F PT FALLS ASSESS-DOCD LE1/YR: CPT | Performed by: FAMILY MEDICINE

## 2019-07-29 RX ORDER — SIMVASTATIN 20 MG
TABLET ORAL
Qty: 90 TABLET | Refills: 1 | OUTPATIENT
Start: 2019-07-29

## 2019-07-29 RX ORDER — TRAMADOL HYDROCHLORIDE 50 MG/1
50 TABLET ORAL EVERY 8 HOURS PRN
Qty: 30 TABLET | Refills: 0 | Status: SHIPPED | OUTPATIENT
Start: 2019-07-29 | End: 2019-08-23 | Stop reason: SDUPTHER

## 2019-07-29 NOTE — PROGRESS NOTES
Chief Complaint   Patient presents with    Ankle Pain     right     Wound Infection   Assessment/Plan:         Diagnoses and all orders for this visit:    Ankle wound, right, sequela  -     VAS lower limb arterial duplex, complete bilateral; Future  -     Ambulatory referral to Wound Care; Future    Claudication of both lower extremities (HCC)  -     VAS lower limb arterial duplex, complete bilateral; Future    Acute right ankle pain  -     traMADol (ULTRAM) 50 mg tablet; Take 1 tablet (50 mg total) by mouth every 8 (eight) hours as needed for moderate pain          Subjective:      Patient ID: Bev Davis is a [de-identified] y o  male  Follow up right medial ankle wound not healing but getting worse  Patient also admits to BL calf discomfort walking one block with 20 degree slope  Has to stop and rest   Patient thinks might of started the lesion with bumping ankle with right heel  The following portions of the patient's history were reviewed and updated as appropriate: allergies, past family history, past medical history, past social history and past surgical history  Review of Systems   Constitutional: Negative  Respiratory: Negative  Cardiovascular: Negative  Musculoskeletal:        Claudication symptoms BL calfs with walking one block  Has to stop and rest    Skin:        Ulcerated lesion, no change, surrounding skin getting worse           Objective:      /66 (BP Location: Left arm, Patient Position: Sitting, Cuff Size: Standard)   Pulse 70   Temp 98 2 °F (36 8 °C) (Oral)   Ht 5' 5" (1 651 m)   Wt 74 1 kg (163 lb 6 4 oz)   SpO2 98%   BMI 27 19 kg/m²     Current Outpatient Medications:     atenolol (TENORMIN) 25 mg tablet, Take 0 5 tablets (12 5 mg total) by mouth daily for 180 days, Disp: 45 tablet, Rfl: 1    bimatoprost (LUMIGAN) 0 01 % ophthalmic drops, Administer 1 drop to both eyes daily at bedtime, Disp: , Rfl:     brinzolamide (AZOPT) 1 % ophthalmic suspension, Administer 1 drop to both eyes 2 (two) times a day, Disp: , Rfl:     losartan (COZAAR) 50 mg tablet, Take 1 tablet (50 mg total) by mouth daily for 180 days, Disp: 90 tablet, Rfl: 1    multivitamin (THERAGRAN) TABS, Take 1 tablet by mouth daily, Disp: , Rfl:     silver sulfadiazine (SILVADENE,SSD) 1 % cream, Apply topically daily, Disp: 50 g, Rfl: 2    simvastatin (ZOCOR) 20 mg tablet, Take 20 mg by mouth daily at bedtime, Disp: , Rfl:     traMADol (ULTRAM) 50 mg tablet, Take 1 tablet (50 mg total) by mouth every 8 (eight) hours as needed for moderate pain, Disp: 30 tablet, Rfl: 0    triamcinolone (KENALOG) 0 5 % cream, Apply topically 3 (three) times a day, Disp: 30 g, Rfl: 1     Allergies   Allergen Reactions    Other      SESAME SEEDS            Physical Exam   Constitutional: He appears well-developed and well-nourished  HENT:   Head: Normocephalic and atraumatic  Cardiovascular: Normal rate, regular rhythm and normal heart sounds  Pedal pulses right foot difficult to palpate  Pulmonary/Chest: Effort normal and breath sounds normal    Skin: Skin is warm and dry  1 5 - 2 cm diameter ulcerated lesion right ankle iunvolving the skin  Not full thickness  Psychiatric: He has a normal mood and affect   His behavior is normal  Judgment and thought content normal

## 2019-07-31 DIAGNOSIS — E78.00 HYPERCHOLESTEREMIA: Primary | ICD-10-CM

## 2019-07-31 RX ORDER — SIMVASTATIN 20 MG
20 TABLET ORAL
Qty: 90 TABLET | Refills: 1 | Status: SHIPPED | OUTPATIENT
Start: 2019-07-31 | End: 2019-10-15 | Stop reason: SDUPTHER

## 2019-07-31 NOTE — TELEPHONE ENCOUNTER
Pt called for medication refills, pt stated that he got in with wound care and will see them on Friday 8/02/2019

## 2019-08-01 ENCOUNTER — APPOINTMENT (OUTPATIENT)
Dept: WOUND CARE | Facility: HOSPITAL | Age: 79
End: 2019-08-01
Payer: COMMERCIAL

## 2019-08-01 PROCEDURE — 11042 DBRDMT SUBQ TIS 1ST 20SQCM/<: CPT

## 2019-08-01 PROCEDURE — 99213 OFFICE O/P EST LOW 20 MIN: CPT

## 2019-08-01 PROCEDURE — G0463 HOSPITAL OUTPT CLINIC VISIT: HCPCS

## 2019-08-08 ENCOUNTER — APPOINTMENT (OUTPATIENT)
Dept: WOUND CARE | Facility: HOSPITAL | Age: 79
End: 2019-08-08
Payer: COMMERCIAL

## 2019-08-08 PROCEDURE — 97597 DBRDMT OPN WND 1ST 20 CM/<: CPT

## 2019-08-16 ENCOUNTER — HOSPITAL ENCOUNTER (OUTPATIENT)
Dept: NON INVASIVE DIAGNOSTICS | Facility: CLINIC | Age: 79
Discharge: HOME | End: 2019-08-16
Payer: COMMERCIAL

## 2019-08-16 DIAGNOSIS — I73.9 CLAUDICATION OF BOTH LOWER EXTREMITIES (HCC): ICD-10-CM

## 2019-08-16 DIAGNOSIS — S91.001S ANKLE WOUND, RIGHT, SEQUELA: ICD-10-CM

## 2019-08-16 PROCEDURE — 93925 LOWER EXTREMITY STUDY: CPT

## 2019-08-16 PROCEDURE — 93923 UPR/LXTR ART STDY 3+ LVLS: CPT

## 2019-08-17 PROCEDURE — 93922 UPR/L XTREMITY ART 2 LEVELS: CPT | Performed by: SURGERY

## 2019-08-17 PROCEDURE — 93925 LOWER EXTREMITY STUDY: CPT | Performed by: SURGERY

## 2019-08-19 ENCOUNTER — TELEPHONE (OUTPATIENT)
Dept: FAMILY MEDICINE CLINIC | Facility: CLINIC | Age: 79
End: 2019-08-19

## 2019-08-19 ENCOUNTER — TRANSCRIBE ORDERS (OUTPATIENT)
Dept: VASCULAR SURGERY | Facility: CLINIC | Age: 79
End: 2019-08-19

## 2019-08-19 DIAGNOSIS — R52 PAIN: Primary | ICD-10-CM

## 2019-08-19 DIAGNOSIS — I73.9 PVD (PERIPHERAL VASCULAR DISEASE) (HCC): Primary | ICD-10-CM

## 2019-08-19 NOTE — TELEPHONE ENCOUNTER
----- Message from Kevin Remy DO sent at 8/19/2019  8:59 AM EDT -----  Arterial study with abnormality,  Needs to see Vascular  Has patient seen wound care ?

## 2019-08-19 NOTE — TELEPHONE ENCOUNTER
Patient notified Arterial ultrasound shows occlusion on right and left leg but worst on left leg  Dr Lazarus Rocker spoke to patient on the phone as well and explained why he should see vascular specialist  Patient given [facility] phone number and he stated he will call to set up the appointment

## 2019-08-22 ENCOUNTER — APPOINTMENT (OUTPATIENT)
Dept: WOUND CARE | Facility: HOSPITAL | Age: 79
End: 2019-08-22
Payer: COMMERCIAL

## 2019-08-22 PROCEDURE — 11042 DBRDMT SUBQ TIS 1ST 20SQCM/<: CPT

## 2019-08-22 PROCEDURE — 11045 DBRDMT SUBQ TISS EACH ADDL: CPT

## 2019-08-23 ENCOUNTER — TELEPHONE (OUTPATIENT)
Dept: FAMILY MEDICINE CLINIC | Facility: CLINIC | Age: 79
End: 2019-08-23

## 2019-08-23 DIAGNOSIS — M25.571 ACUTE RIGHT ANKLE PAIN: ICD-10-CM

## 2019-08-23 RX ORDER — TRAMADOL HYDROCHLORIDE 50 MG/1
50 TABLET ORAL EVERY 8 HOURS PRN
Qty: 30 TABLET | Refills: 1 | Status: SHIPPED | OUTPATIENT
Start: 2019-08-23 | End: 2020-07-07

## 2019-08-23 NOTE — TELEPHONE ENCOUNTER
Called patient's home and spoke to wife, she was told prescription was called in  Instructed her to tell the patient he should be taking Tramadol only PRN and when his foot feels better or not having anymore pain, he should wean off it  She stated she understood and will let the patient know

## 2019-09-06 ENCOUNTER — APPOINTMENT (OUTPATIENT)
Dept: WOUND CARE | Facility: HOSPITAL | Age: 79
End: 2019-09-06
Payer: COMMERCIAL

## 2019-09-06 PROCEDURE — 97598 DBRDMT OPN WND ADDL 20CM/<: CPT

## 2019-09-06 PROCEDURE — 97597 DBRDMT OPN WND 1ST 20 CM/<: CPT

## 2019-09-13 ENCOUNTER — APPOINTMENT (OUTPATIENT)
Dept: WOUND CARE | Facility: HOSPITAL | Age: 79
End: 2019-09-13
Payer: COMMERCIAL

## 2019-09-13 PROCEDURE — 11042 DBRDMT SUBQ TIS 1ST 20SQCM/<: CPT

## 2019-09-13 PROCEDURE — 11045 DBRDMT SUBQ TISS EACH ADDL: CPT

## 2019-09-16 ENCOUNTER — CONSULT (OUTPATIENT)
Dept: VASCULAR SURGERY | Facility: CLINIC | Age: 79
End: 2019-09-16
Payer: COMMERCIAL

## 2019-09-16 VITALS
DIASTOLIC BLOOD PRESSURE: 66 MMHG | RESPIRATION RATE: 16 BRPM | BODY MASS INDEX: 27.16 KG/M2 | WEIGHT: 163 LBS | SYSTOLIC BLOOD PRESSURE: 124 MMHG | HEART RATE: 74 BPM | HEIGHT: 65 IN | TEMPERATURE: 97.9 F

## 2019-09-16 DIAGNOSIS — I73.9 PERIPHERAL ARTERIAL DISEASE (HCC): Primary | ICD-10-CM

## 2019-09-16 DIAGNOSIS — E78.00 HYPERCHOLESTEROLEMIA: ICD-10-CM

## 2019-09-16 DIAGNOSIS — I73.9 PVD (PERIPHERAL VASCULAR DISEASE) (HCC): ICD-10-CM

## 2019-09-16 DIAGNOSIS — I10 BENIGN ESSENTIAL HYPERTENSION: ICD-10-CM

## 2019-09-16 PROBLEM — I87.2 CHRONIC VENOUS INSUFFICIENCY: Status: ACTIVE | Noted: 2019-09-16

## 2019-09-16 PROCEDURE — 99244 OFF/OP CNSLTJ NEW/EST MOD 40: CPT | Performed by: PHYSICIAN ASSISTANT

## 2019-09-16 RX ORDER — SODIUM CHLORIDE 9 MG/ML
100 INJECTION, SOLUTION INTRAVENOUS CONTINUOUS
Status: CANCELLED | OUTPATIENT
Start: 2019-09-16

## 2019-09-16 RX ORDER — CEPHALEXIN 500 MG/1
CAPSULE ORAL
Refills: 0 | Status: ON HOLD | COMMUNITY
Start: 2019-09-06 | End: 2019-09-20

## 2019-09-16 RX ORDER — COLLAGENASE SANTYL 250 [ARB'U]/G
OINTMENT TOPICAL
Refills: 0 | COMMUNITY
Start: 2019-09-06 | End: 2020-07-07

## 2019-09-16 NOTE — ASSESSMENT & PLAN NOTE
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 with R JANICE 0 60/38/23 and L JANICE NC/42/40  +palpable femoral pulses  No signs of acute critical limb ischemia  Wound likely mixed venous and arterial etiology given chronic venous stasis changes  -recommend revascularization to optimize wound healing  Given rest pain and tissue loss, recommend proceeding with abdominal aortogram with RLE runoff, possible endovascular intervention  +R CFA disease but palpable femoral pulses  Patient does have 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 as demonstrated on recent noninvasive and prior CT ('16) imaging and may ultimately require surgical revascularization but will start with endovascular attempt  If unable to address endovascularly, agram images will help guide treatment options  Patient and wife express understanding and wish to proceed as planned   -last creat 1 09/GFR 64 on 12/4/2018  Will repeat repeat labs prior to Agram   All preprocedure labs ordered  -recommend ASA 81 mg daily given underlying arterial occlusive disease    Patient presently taking aspirin for pain relief of right foot   -continue statin therapy  -continue [address] per [facility]  -return to office with surgeon after angiogram for review of results  -instructed to contact the office in the interim with any questions, concerns, new symptoms or worsening of wounds

## 2019-09-16 NOTE — ASSESSMENT & PLAN NOTE
Nonhealing Right medial malleolar wound likely mixed venous and arterial etiology  -continue local [facility]  -recommend compression elevation for edema control once arterial occlusive disease addressed

## 2019-09-16 NOTE — PROGRESS NOTES
Peripheral arterial disease [facility])  79-year-old male with a history of HTN, HLD, prostate CA, s/p robotic prostatectomy '14, chronic venous insufficiency and PAD with bilateral 1 block calf claudication x 1 year and now nonhealing R medial malleolar wound and RLE rest pain x 3 months  Noninvasive imaging demonstrates R CFA disease and bilateral SFA occlusions with R JANICE 0 60/38/23 and L JANICE NC/42/40  +palpable femoral pulses  No signs of acute critical limb ischemia  Wound likely mixed venous and arterial etiology given chronic venous stasis changes  -recommend revascularization to optimize wound healing  Given rest pain and tissue loss, recommend proceeding with abdominal aortogram with RLE runoff, possible endovascular intervention  +R CFA disease but palpable femoral pulses  Patient does have 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 as demonstrated on recent noninvasive and prior CT ('16) imaging and may ultimately require surgical revascularization but will start with endovascular attempt  If unable to address endovascularly, agram images will help guide treatment options  Patient and wife express understanding and wish to proceed as planned   -last creat 1 09/GFR 64 on 12/4/2018  Will repeat repeat labs prior to Agram   All preprocedure labs ordered  -recommend ASA 81 mg daily given underlying arterial occlusive disease    Patient presently taking aspirin for pain relief of right foot   -continue statin therapy  -continue [address] per [facility]  -return to office with surgeon after angiogram for review of results  -instructed to contact the office in the interim with any questions, concerns, new symptoms or worsening of wounds    Hypercholesterolemia  -stable  -continue statin therapy  -management per PCP    Benign essential hypertension  -BP well controlled  -continue current medical regimen  -management per PCP    Chronic venous insufficiency  Nonhealing Right medial malleolar wound likely mixed venous and arterial etiology  -continue local [facility]  -recommend compression elevation for edema control once arterial occlusive disease addressed  Assessment/Plan   Diagnoses and all orders for this visit:    Peripheral arterial disease ([facility] )  -     Basic metabolic panel; Future  -     CBC and differential; Future  -     APTT; Future  -     Protime-INR; Future    Hypercholesterolemia    Benign essential hypertension    PVD (peripheral vascular disease) ([facility] )  -     Ambulatory referral to Vascular Surgery    Other orders  -     SANTYL ointment; APPLY A NICKEL THICK LAYER TO WOUND DAILY - OR MORE FREQUENTLY IF WOUND BECOMES SOILED  -     cephalexin (KEFLEX) 500 mg capsule; take 1 capsule by mouth four times a day for 7 days  -     Notify Physician in PT/INR greater than 1 8 and/or Creatine Clearance (gFR)  is less than 60  ml/van/1 73sq m; Standing  -     Height and Weight Upon Arrival; Standing  -     Nursing communcation Apply snapless gown prior to procedure; Standing  -     Have Patient Void On Call to Procedure Room; Standing  -     Insert and Maintain IV; Standing  -     IR lower extremity / intervention; Standing  -     Diet NPO; Sips with meds; Standing  -     Nursing communcation Confirm last dose of any anticoagulation and antiplatelet medication and notify IR; Standing  -     Nursing Communication Do not use for medications  Order will not be received by Pharmacy or dispensed to patient ; Standing  -     Nursing communcation If patient on diabetic medications, nurse to notify physician to ensure the medications are adjusted preprocedure ; Standing  -     sodium chloride 0 9 % infusion  -     IR lower extremity / intervention        Chief Complaint   Patient presents with    Wound Check     RLE    Leg Pain     Bilateral       Subjective   Patient ID: Wayne Pratt is a [de-identified] y o  male      Pt is new to our practice and was referred by Dr Cynthia Gama DO for evaluation of RLE wound   Pt had a CARSON on 8/16/19  Pt c/o walking 1 block and leg pain gets in  Pt then rests for 2 minutes for relief  The leg pain has been going on for 2 years  The right leg wound has been there for 3-4 months  PT has an open wound on the inside of this right lower leg  Pt advised the wound is getting better  There is redness around the wound  Pt is currently taking Zocor  42-year-old male with a history of HTN, HLD, prostate CA, s/p robotic prostatectomy '14 and PAD with bilateral 1 block calf claudication x 1 year and now nonhealing R medial malleolar wound and RLE rest pain x 3 months who is referred by his PCP for evaluation of arterial occlusive disease patient reports a 1 year history of bilateral calf claudication with ambulating 1 block and relieved with rest   He denies prior tissue loss or rest pain up until 3 months ago  Patient states he has a habit of hitting his right medial malleolar area with his opposite leg when walking  Due to this, he has sustained multiple wounds to the right ankle in the past which have all healed easily  Patient now has a nonhealing wound of the right medial malleolus for the past 3 months and reports new symptoms of right foot rest pain  Patient is being followed by [address] [place] [address] with local wound care  No right foot/toes wounds or gangrene  CARSON 8/16/2019 has been reviewed and demonstrates 50-75% R CFA stenosis, >75% mid SFA stenosis with distal occlusion and reconstitution of the popliteal artery, R peroneal , >75% L proximal and distal SFA stenosis with mid occlusion  R JANICE 0 60/38/23 and L JANICE NC/42/40  Review of previous CT abdomen/pelvis with contrast 12/3/2016 demonstrates R CFA and bilateral SFA disease  No signs of acute critical limb ischemia  Bilateral lower extremities motor and sensory intact  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 [employer]        The following portions of the patient's history were reviewed and updated as appropriate: allergies, current medications, past family history, past medical history, past social history, past surgical history and problem list     Review of Systems   Constitutional: Negative  HENT: Positive for hearing loss  Eyes: Negative  Respiratory: Negative  Cardiovascular: Positive for leg swelling  Gastrointestinal: Negative  Endocrine: Negative  Genitourinary: Negative  Musculoskeletal: Negative  Leg Cramping with Walking   Skin: Positive for wound  Allergic/Immunologic: Positive for food allergies  Neurological: Negative  Hematological: Negative  Psychiatric/Behavioral: Negative  I have personally reviewed the ROS entered by MA and agree as documented      Patient Active Problem List   Diagnosis    Inguinal hernia of right side with obstruction    Benign essential hypertension    Pro[facility] cancer [facility] )    Hypercholesterolemia    Peripheral arterial disease ([facility] )    Chronic venous insufficiency       Past Surgical History:   Procedure Laterality Date    AR REPAIR ING HERNIA,5+Y/O,REDUCIBL Right 12/7/2016    Procedure: REPAIR HERNIA INGUINAL; HYDROCELECTOMY;  Surgeon: Melecio Fuentes MD;  Location: [facility];  Service: General    PROSTATE SURGERY  2014    PROSTATECTOMY RADICAL; LAST ASSESSED: 27OCT2015    SKIN CANCER EXCISION      TRANSURETHRAL RESECTION OF PROSTATE  2011       Family History   Problem Relation Age of Onset    Hypertension Mother     Cancer Father     Kidney failure Family        Social History     Socioeconomic History    Marital status: /Civil Union     Spouse name: Not on file    Number of children: Not on file    Years of education: Not on file    Highest education level: Not on file   Occupational History    Occupation: RETIRED      Employer: [address] [place] [address] Street: [employer]    Social Needs    Financial resource strain: Not on file    Food insecurity:     Worry: Not on file Inability: Not on file    Transportation needs:     Medical: Not on file     Non-medical: Not on file   Tobacco Use    Smoking status: Never Smoker    Smokeless tobacco: Never Used   Substance and Sexual Activity    Alcohol use: No    Drug use: No    Sexual activity: Not on file   Lifestyle    Physical activity:     Days per week: Not on file     Minutes per session: Not on file    Stress: Not on file   Relationships    Social connections:     Talks on phone: Not on file     Gets together: Not on file     Attends Buddhist service: Not on file     Active member of club or organization: Not on file     Attends meetings of clubs or organizations: Not on file     Relationship status: Not on file    Intimate partner violence:     Fear of current or ex partner: Not on file     Emotionally abused: Not on file     Physically abused: Not on file     Forced sexual activity: Not on file   Other Topics Concern    Not on file   Social History Narrative    Always uses seat belt    No advance directives       Allergies   Allergen Reactions    Other      SESAME SEEDS           Current Outpatient Medications:     bimatoprost (LUMIGAN) 0 01 % ophthalmic drops, Administer 1 drop to both eyes daily at bedtime, Disp: , Rfl:     brinzolamide (AZOPT) 1 % ophthalmic suspension, Administer 1 drop to both eyes 2 (two) times a day, Disp: , Rfl:     losartan (COZAAR) 50 mg tablet, Take 1 tablet (50 mg total) by mouth daily for 180 days, Disp: 90 tablet, Rfl: 1    multivitamin (THERAGRAN) TABS, Take 1 tablet by mouth daily, Disp: , Rfl:     SANTYL ointment, APPLY A NICKEL THICK LAYER TO WOUND DAILY - OR MORE FREQUENTLY IF WOUND BECOMES SOILED, Disp: , Rfl: 0    simvastatin (ZOCOR) 20 mg tablet, Take 1 tablet (20 mg total) by mouth daily at bedtime for 180 days, Disp: 90 tablet, Rfl: 1    traMADol (ULTRAM) 50 mg tablet, Take 1 tablet (50 mg total) by mouth every 8 (eight) hours as needed for moderate pain, Disp: 30 tablet, Rfl: 1    atenolol (TENORMIN) 25 mg tablet, Take 0 5 tablets (12 5 mg total) by mouth daily for 180 days, Disp: 45 tablet, Rfl: 1    cephalexin (KEFLEX) 500 mg capsule, take 1 capsule by mouth four times a day for 7 days, Disp: , Rfl: 0    silver sulfadiazine (SILVADENE,SSD) 1 % cream, Apply topically daily (Patient not taking: Reported on 9/16/2019), Disp: 50 g, Rfl: 2    triamcinolone (KENALOG) 0 5 % cream, Apply topically 3 (three) times a day (Patient not taking: Reported on 9/16/2019), Disp: 30 g, Rfl: 1    Objective      Imaging study:  CARSON 8/16/2019:  Imaging study reviewed and as described above  See full report below:  Segment                Right                 Left                                            Impression  PSV  EDV  Impression  PSV  EDV    Common Femoral Artery  50-75%      211   28              107   12    Prox Profunda                       96   10              112    9    Prox SFA                            54    4  >75%        238   31    Mid SFA                >75%        360   47  Occluded                Dist SFA               Occluded              >75%        163   13    Proximal Pop                        48   19  >75%        283   34    Distal Pop                          34   13               25    6    Dist Post Tibial                    54   21               24    5    Dist  Ant  Tibial                   91   33               66   20    Dist Peroneal          Occluded                           19    5    DorsPedis                           51   22               57   19       CONCLUSION:  Impression:  RIGHT LOWER LIMB:  This resting evaluation shows evidence of significant lower extremity fem-pop  arterial occlusive disease with 50-75% stenosis in RT CFA; >75% in Mid SFA; and  occlusion in the Distal SFA/Peroneal artery  Multiple collaterals seen in the  distal thigh/calf and monophasic flow is seen in the popliteal; posterior and  tibial arteries    Ankle/Brachial index: 0 60 no prior  PVR/ PPG tracings are dampened  Metatarsal pressure of 38 mmHg  Great toe pressure of 23 mmHg, below the healing range for non-diabetic  LEFT LOWER LIMB:  This resting evaluation shows evidence of significant lower extremity fem-pop  arterial occlusive disease with >75% stenosis in LT Prox SFA; Occluded mid SFA;  and >75% in distal SFA  Monophasic flow is noted in the distal arteries  Ankle/Brachial index:  Non Compressible vessels  no prior  PVR/ PPG tracings are dampened  Metatarsal pressure of 42 mmHg  Great toe pressure of 40 mmHg, within the healing range for non diabetic    Physical Exam:    General appearance: alert and oriented, in no acute distress  Skin: Skin color, texture, turgor normal  No rashes or lesions  Neurologic: Grossly normal  Head: Normocephalic, without obvious abnormality, atraumatic  Eyes: PERRL, EOMI, sclera nonicteric  Throat: lips, mucosa, and tongue normal; teeth and gums normal  Neck: no adenopathy, no carotid bruit, no JVD, supple, symmetrical, trachea midline and thyroid not enlarged, symmetric, no tenderness/mass/nodules  Back: symmetric, no curvature  ROM normal  No CVA tenderness  Lungs: clear to auscultation bilaterally  Chest wall: no tenderness  Heart: regular rate and rhythm, S1, S2 normal, no murmur, click, rub or gallop  Abdomen: soft, non-tender; bowel sounds normal; no masses,  no organomegaly and Nondistended  No abdominal bruits  Extremities: Bilateral lower extremities warm, pink, motor and sensory intact  Chronic venous stasis changes bilateral lower extremities, R>L   + hemosiderin staining left medial malleolus  Superficial wound right medial malleolus with few satellite lesions with exposed fat layer but no evidence of tendon or bone exposure  Mild erythema  No crepitus  No gangrene or wounds of the foot  +R foot dependent rubor  +edema R foot  No pallor or cyanosis      Pulse exam:  Radial: Right: 2+ Left[de-identified] 2+   Femoral: Right: 2+ Left: 2+  Popliteal: Right: non-palpable Left: non-palpable  DP: Right: doppler signal Left: doppler signal  PT: Right: non-palpable Left: doppler signal   Doppler signals:  Right:  Monophasic AT, DP, biphasic peroneal, no dopplerable PT  Left:  Biphasic PT, AT, DP  No dopplerable peroneal    Operative Scheduling Information:    Hospital:  IR    Physician:  Any other surgeon and Any IR doc    Surgery:  Abdominal aortogram with right lower extremity runoff, possible endovascular intervention    Urgency:  Urgent:   Within 2 weeks secondary to tissue loss and rest pain    Case Length:  2-3 hours, possibly 1 hour if just diagnostic    Post-op Bed:  Outpatient    OR Table:  IR    Equipment Needs:  None    Medication Instructions:  Aspirin:   Continue (do not hold)  Other:  Losartan  Hold for 1 day prior to procedure and day of procedure    Hydration:  No

## 2019-09-16 NOTE — PATIENT INSTRUCTIONS
Peripheral Artery Disease   WHAT YOU NEED TO KNOW:   What is peripheral artery disease? Peripheral artery disease (PAD) is narrow, weak, or blocked arteries  It may affect any arteries outside of your heart and brain  PAD is usually the result of a buildup of fat and cholesterol, also called plaque, along your artery walls  Inflammation, a blood clot, or abnormal cell growth could also block your arteries  PAD prevents normal blood flow to your legs and arms  You are at risk of an amputation if poor blood flow keeps wounds from healing or causes gangrene (tissue death)  Without treatment, PAD can also cause a heart attack or stroke  What increases my risk for PAD? · Smoking cigarettes     · Diabetes     · High blood pressure     · High cholesterol     · Age older than 40 years    · Heart disease or a family history of heart disease  What are the signs and symptoms of PAD? Mild PAD usually does not cause symptoms  As the disease worsens over time, you may have the following:  · Pain or cramps in your leg or hip while you walk     · A numb, weak, or heavy feeling in your legs     · Dry, scaly, red, or pale skin on your legs     · Thick or brittle nails, or hair loss on your arms and legs     · Foot sores that will not heal     · Burning or aching in your feet and toes while resting (this may be worse when you lie down)  How is PAD diagnosed? · Angiography  is a test that shows pictures of the arteries in your arms and legs  You will be given contrast liquid to help the arteries show up better on the pictures  The pictures will be taken with an MRI or CT scan  Tell the healthcare provider if you have ever had an allergic reaction to contrast liquid  Do not enter the MRI room with anything metal  Metal can cause serious injury  Tell a healthcare provider if you have any metal in or on your body      · Doppler ankle brachial index (JANICE)  is a test that compares blood pressure in your ankles to blood pressure in your arms  This tells your healthcare provider how well blood is flowing through the arteries in your legs  How is PAD treated? Treatment can help reduce your risk of a heart attack, stroke, or amputation  You may need more than one of the following:  · Medicines  may be given  Your healthcare provider may give you any of the following:     ¨ Antiplatelet medicine,  such as aspirin, helps prevent blood clots and reduces the risk of a heart attack or stroke  ¨ Statin medicine  helps lower your cholesterol and prevents your PAD from getting worse  · A supervised exercise program  helps you stay active in normal daily activities and may prevent disability  Healthcare providers will help you safely walk or do strength training exercises 3 times a week for 30 to 60 minutes  You will do this for several months, then transition to walking on your own  · Angioplasty  is a procedure to open your artery so blood can flow through normally  A thin tube called a catheter is used to insert a small balloon into your artery  The balloon is inflated to open your blocked artery, and then removed  A tube called a stent may be placed in your artery to hold it open  · Bypass surgery  is used to make a new connection to your artery with a vein from another part of your body, or an artificial graft  The vein or graft is attached to your artery above and below your blockage  This allows blood to flow around the blocked portion of your artery  How can I manage PAD? · Do not smoke  Nicotine and other chemicals in cigarettes and cigars can worsen PAD  They can also increase your risk for a heart attack or stroke  Ask your healthcare provider for information if you currently smoke and need help to quit  E-cigarettes or smokeless tobacco still contain nicotine  Talk to your healthcare provider before you use these products  · Manage other health conditions  Take your medicines as directed   Follow your healthcare provider's instructions if you have high blood pressure or high cholesterol  Perform foot care and check your blood sugar levels as directed if you have diabetes  · Eat heart healthy foods  Eat whole grains, fruits, and vegetables every day  Limit salt and high-fat foods  Ask your healthcare provider for more information on a heart healthy diet  Ask if you need to lose weight  Your healthcare provider can help you create a healthy weight-loss plan  Call 911 for the following:   · You have any of the following signs of a heart attack:      ¨ Squeezing, pressure, or pain in your chest that lasts longer than 5 minutes or returns    ¨ Discomfort or pain in your back, neck, jaw, stomach, or arm     ¨ Trouble breathing    ¨ Nausea or vomiting    ¨ Lightheadedness or a sudden cold sweat, especially with chest pain or trouble breathing    · You have any of the following signs of a stroke:      ¨ Numbness or drooping on one side of your face     ¨ Weakness in an arm or leg    ¨ Confusion or difficulty speaking    ¨ Dizziness, a severe headache, or vision loss  When should I seek immediate care? · You have sores or wounds that will not heal      · You notice black or discolored skin on your arm or leg  · Your skin is cool to the touch  When should I contact my healthcare provider? · You have leg pain when you walk 1/8 mile (200 meters) or less, even with treatment  · Your legs are red, dry, or pale, even with treatment  · You have questions or concerns about your condition or care  CARE AGREEMENT:   You have the right to help plan your care  Learn about your health condition and how it may be treated  Discuss treatment options with your caregivers to decide what care you want to receive  You always have the right to refuse treatment  The above information is an  only  It is not intended as medical advice for individual conditions or treatments   Talk to your doctor, nurse or pharmacist before following any medical regimen to see if it is safe and effective for you  © 2017 [facility] Information is for End User's use only and may not be sold, redistributed or otherwise used for commercial purposes  All illustrations and images included in CareNotes® are the copyrighted property of A D A M , Inc  or Ammon Zhu  -recommend improving blood flow to right foot in order to optimize wound healing  Recommend right leg arteriogram with possible endovascular intervention as discussed in the office   -you will be required to get preprocedure blood work as discussed  -the office will be contacting you with scheduled date and time of procedure    Nothing to eat after midnight on the night prior to angiogram  -hold losartan 1 day before and day of angiogram  -continue simvastatin  -recommend aspirin 81 mg daily  -return to office with surgeon after angiogram for review results and discussion  -please contact the office with new symptoms or changes in wound

## 2019-09-18 ENCOUNTER — APPOINTMENT (OUTPATIENT)
Dept: LAB | Facility: CLINIC | Age: 79
End: 2019-09-18
Payer: COMMERCIAL

## 2019-09-18 ENCOUNTER — TELEPHONE (OUTPATIENT)
Dept: VASCULAR SURGERY | Facility: CLINIC | Age: 79
End: 2019-09-18

## 2019-09-18 ENCOUNTER — TELEPHONE (OUTPATIENT)
Dept: RADIOLOGY | Facility: HOSPITAL | Age: 79
End: 2019-09-18

## 2019-09-18 DIAGNOSIS — I73.9 PERIPHERAL ARTERIAL DISEASE (HCC): ICD-10-CM

## 2019-09-18 DIAGNOSIS — I73.9 PERIPHERAL ARTERIAL DISEASE (HCC): Primary | ICD-10-CM

## 2019-09-18 LAB
ANION GAP SERPL CALCULATED.3IONS-SCNC: 5 MMOL/L (ref 4–13)
APTT PPP: 27 SECONDS (ref 23–37)
BASOPHILS # BLD AUTO: 0.09 THOUSANDS/ΜL (ref 0–0.1)
BASOPHILS NFR BLD AUTO: 1 % (ref 0–1)
BUN SERPL-MCNC: 31 MG/DL (ref 5–25)
CALCIUM SERPL-MCNC: 9.3 MG/DL (ref 8.3–10.1)
CHLORIDE SERPL-SCNC: 108 MMOL/L (ref 100–108)
CO2 SERPL-SCNC: 29 MMOL/L (ref 21–32)
CREAT SERPL-MCNC: 1.19 MG/DL (ref 0.6–1.3)
EOSINOPHIL # BLD AUTO: 0.73 THOUSAND/ΜL (ref 0–0.61)
EOSINOPHIL NFR BLD AUTO: 4 % (ref 0–6)
ERYTHROCYTE [DISTWIDTH] IN BLOOD BY AUTOMATED COUNT: 13.8 % (ref 11.6–15.1)
GFR SERPL CREATININE-BSD FRML MDRD: 57 ML/MIN/1.73SQ M
GLUCOSE P FAST SERPL-MCNC: 107 MG/DL (ref 65–99)
HCT VFR BLD AUTO: 42.8 % (ref 36.5–49.3)
HGB BLD-MCNC: 13.6 G/DL (ref 12–17)
IMM GRANULOCYTES # BLD AUTO: 0.17 THOUSAND/UL (ref 0–0.2)
IMM GRANULOCYTES NFR BLD AUTO: 1 % (ref 0–2)
INR PPP: 0.95 (ref 0.84–1.19)
LYMPHOCYTES # BLD AUTO: 2.65 THOUSANDS/ΜL (ref 0.6–4.47)
LYMPHOCYTES NFR BLD AUTO: 16 % (ref 14–44)
MCH RBC QN AUTO: 29.9 PG (ref 26.8–34.3)
MCHC RBC AUTO-ENTMCNC: 31.8 G/DL (ref 31.4–37.4)
MCV RBC AUTO: 94 FL (ref 82–98)
MONOCYTES # BLD AUTO: 1.23 THOUSAND/ΜL (ref 0.17–1.22)
MONOCYTES NFR BLD AUTO: 8 % (ref 4–12)
NEUTROPHILS # BLD AUTO: 11.59 THOUSANDS/ΜL (ref 1.85–7.62)
NEUTS SEG NFR BLD AUTO: 70 % (ref 43–75)
NRBC BLD AUTO-RTO: 0 /100 WBCS
PLATELET # BLD AUTO: 236 THOUSANDS/UL (ref 149–390)
PMV BLD AUTO: 11 FL (ref 8.9–12.7)
POTASSIUM SERPL-SCNC: 4.7 MMOL/L (ref 3.5–5.3)
PROTHROMBIN TIME: 12.3 SECONDS (ref 11.6–14.5)
RBC # BLD AUTO: 4.55 MILLION/UL (ref 3.88–5.62)
SODIUM SERPL-SCNC: 142 MMOL/L (ref 136–145)
WBC # BLD AUTO: 16.46 THOUSAND/UL (ref 4.31–10.16)

## 2019-09-18 PROCEDURE — 85025 COMPLETE CBC W/AUTO DIFF WBC: CPT

## 2019-09-18 PROCEDURE — 80048 BASIC METABOLIC PNL TOTAL CA: CPT

## 2019-09-18 PROCEDURE — 36415 COLL VENOUS BLD VENIPUNCTURE: CPT

## 2019-09-18 PROCEDURE — 85610 PROTHROMBIN TIME: CPT

## 2019-09-18 PROCEDURE — 85730 THROMBOPLASTIN TIME PARTIAL: CPT

## 2019-09-18 NOTE — TELEPHONE ENCOUNTER
S/w pt and advised his procedure with IR is scheduled for 9-20-19 at [facility]  He is aware nothing to eat or drink after midnight on 9-19-19  He is aware that he is holding Losartan the day before and 1 day after his procedure  His last dose will be today  He is going to a [address] facility today for his blood work  He is not fasting because there is no time between now and his surgery for him to fast and have the blood work done  Surgery scheduling form sent to prior authorization department to determine if authorization is needed

## 2019-09-18 NOTE — TELEPHONE ENCOUNTER
I called the pt at home and on his cell to schedule his procedure  It is being done by IR and they just got back to me that it is scheduled for 9-20-19  I left him a message on both phones to call me back

## 2019-09-18 NOTE — PROGRESS NOTES
Pre-instructions given to Miya Urbano NPO after midnight, need  home,  Hold cozaar am of procedure, report to short stay at [facility] time of arrival is given by short stay  Labs are ok to proceed, Dr Lew Caputo aware of Arteriogram for this Friday 9/20/19    May need jetstream

## 2019-09-19 ENCOUNTER — TELEPHONE (OUTPATIENT)
Dept: SURGERY | Facility: HOSPITAL | Age: 79
End: 2019-09-19

## 2019-09-19 NOTE — PROGRESS NOTES
Will need IV hydration 1 hour pre- arteriogram and 4 hours post per Dr Danielle Alejo  Arteriogram set for this Friday 9/20/19

## 2019-09-20 ENCOUNTER — HOSPITAL ENCOUNTER (OUTPATIENT)
Dept: RADIOLOGY | Facility: HOSPITAL | Age: 79
Discharge: HOME | End: 2019-09-20
Attending: SURGERY | Admitting: SURGERY
Payer: COMMERCIAL

## 2019-09-20 VITALS
HEIGHT: 65 IN | HEART RATE: 80 BPM | SYSTOLIC BLOOD PRESSURE: 110 MMHG | DIASTOLIC BLOOD PRESSURE: 82 MMHG | OXYGEN SATURATION: 98 % | WEIGHT: 163 LBS | BODY MASS INDEX: 27.16 KG/M2 | RESPIRATION RATE: 16 BRPM

## 2019-09-20 DIAGNOSIS — I73.9 PERIPHERAL ARTERIAL DISEASE (HCC): ICD-10-CM

## 2019-09-20 PROCEDURE — C1760 CLOSURE DEV, VASC: HCPCS

## 2019-09-20 PROCEDURE — C1769 GUIDE WIRE: HCPCS

## 2019-09-20 PROCEDURE — C1876 STENT, NON-COA/NON-COV W/DEL: HCPCS

## 2019-09-20 PROCEDURE — NC001 PR NO CHARGE: Performed by: RADIOLOGY

## 2019-09-20 PROCEDURE — C1887 CATHETER, GUIDING: HCPCS

## 2019-09-20 PROCEDURE — 75710 ARTERY X-RAYS ARM/LEG: CPT | Performed by: RADIOLOGY

## 2019-09-20 PROCEDURE — 76937 US GUIDE VASCULAR ACCESS: CPT

## 2019-09-20 PROCEDURE — C1894 INTRO/SHEATH, NON-LASER: HCPCS

## 2019-09-20 PROCEDURE — 37232 HB TIB/PER REVASC ADD-ON: CPT

## 2019-09-20 PROCEDURE — C1725 CATH, TRANSLUMIN NON-LASER: HCPCS

## 2019-09-20 PROCEDURE — 37232 PR REVASCULARIZE TIBIAL/PERON ARTERY,ANGIOPLASTY EA ADD: CPT | Performed by: RADIOLOGY

## 2019-09-20 PROCEDURE — 37226 HB FEM/POPL REVASC W/STENT: CPT

## 2019-09-20 PROCEDURE — 75710 ARTERY X-RAYS ARM/LEG: CPT

## 2019-09-20 PROCEDURE — 75625 CONTRAST EXAM ABDOMINL AORTA: CPT

## 2019-09-20 PROCEDURE — 99152 MOD SED SAME PHYS/QHP 5/>YRS: CPT

## 2019-09-20 PROCEDURE — 76937 US GUIDE VASCULAR ACCESS: CPT | Performed by: RADIOLOGY

## 2019-09-20 PROCEDURE — 99153 MOD SED SAME PHYS/QHP EA: CPT

## 2019-09-20 PROCEDURE — 37226 PR REVASCULARIZE FEM/POP ARTERY,ANGIOPLASTY/STENT: CPT | Performed by: RADIOLOGY

## 2019-09-20 PROCEDURE — 37228 HB TIB/PER REVASC W/TLA: CPT

## 2019-09-20 PROCEDURE — 99152 MOD SED SAME PHYS/QHP 5/>YRS: CPT | Performed by: RADIOLOGY

## 2019-09-20 PROCEDURE — 37228 PR REVASCULARIZE TIBIAL/PERON ARTERY,ANGIOPLASTY INITIAL: CPT | Performed by: RADIOLOGY

## 2019-09-20 RX ORDER — FENTANYL CITRATE 50 UG/ML
INJECTION, SOLUTION INTRAMUSCULAR; INTRAVENOUS CODE/TRAUMA/SEDATION MEDICATION
Status: COMPLETED | OUTPATIENT
Start: 2019-09-20 | End: 2019-09-20

## 2019-09-20 RX ORDER — HEPARIN SODIUM 1000 [USP'U]/ML
INJECTION, SOLUTION INTRAVENOUS; SUBCUTANEOUS CODE/TRAUMA/SEDATION MEDICATION
Status: COMPLETED | OUTPATIENT
Start: 2019-09-20 | End: 2019-09-20

## 2019-09-20 RX ORDER — LIDOCAINE WITH 8.4% SOD BICARB 0.9%(10ML)
SYRINGE (ML) INJECTION CODE/TRAUMA/SEDATION MEDICATION
Status: COMPLETED | OUTPATIENT
Start: 2019-09-20 | End: 2019-09-20

## 2019-09-20 RX ORDER — OXYCODONE HYDROCHLORIDE 5 MG/1
5 TABLET ORAL EVERY 4 HOURS PRN
Status: DISCONTINUED | OUTPATIENT
Start: 2019-09-20 | End: 2019-09-21 | Stop reason: HOSPADM

## 2019-09-20 RX ORDER — CEFAZOLIN SODIUM 1 G/3ML
INJECTION, POWDER, FOR SOLUTION INTRAMUSCULAR; INTRAVENOUS CODE/TRAUMA/SEDATION MEDICATION
Status: COMPLETED | OUTPATIENT
Start: 2019-09-20 | End: 2019-09-20

## 2019-09-20 RX ORDER — HYDRALAZINE HYDROCHLORIDE 20 MG/ML
INJECTION INTRAMUSCULAR; INTRAVENOUS CODE/TRAUMA/SEDATION MEDICATION
Status: COMPLETED | OUTPATIENT
Start: 2019-09-20 | End: 2019-09-20

## 2019-09-20 RX ORDER — SODIUM CHLORIDE 9 MG/ML
100 INJECTION, SOLUTION INTRAVENOUS CONTINUOUS
Status: DISCONTINUED | OUTPATIENT
Start: 2019-09-20 | End: 2019-09-21 | Stop reason: HOSPADM

## 2019-09-20 RX ORDER — ACETAMINOPHEN 325 MG/1
325 TABLET ORAL EVERY 4 HOURS PRN
Status: DISCONTINUED | OUTPATIENT
Start: 2019-09-20 | End: 2019-09-21 | Stop reason: HOSPADM

## 2019-09-20 RX ORDER — MIDAZOLAM HYDROCHLORIDE 1 MG/ML
INJECTION INTRAMUSCULAR; INTRAVENOUS CODE/TRAUMA/SEDATION MEDICATION
Status: COMPLETED | OUTPATIENT
Start: 2019-09-20 | End: 2019-09-20

## 2019-09-20 RX ADMIN — SODIUM CHLORIDE 100 ML/HR: 0.9 INJECTION, SOLUTION INTRAVENOUS at 12:26

## 2019-09-20 RX ADMIN — FENTANYL CITRATE 25 MCG: 50 INJECTION, SOLUTION INTRAMUSCULAR; INTRAVENOUS at 16:14

## 2019-09-20 RX ADMIN — MIDAZOLAM 0.5 MG: 1 INJECTION INTRAMUSCULAR; INTRAVENOUS at 15:41

## 2019-09-20 RX ADMIN — MIDAZOLAM 0.5 MG: 1 INJECTION INTRAMUSCULAR; INTRAVENOUS at 15:34

## 2019-09-20 RX ADMIN — FENTANYL CITRATE 25 MCG: 50 INJECTION, SOLUTION INTRAMUSCULAR; INTRAVENOUS at 16:26

## 2019-09-20 RX ADMIN — HYDRALAZINE HYDROCHLORIDE 10 MG: 20 INJECTION INTRAMUSCULAR; INTRAVENOUS at 15:35

## 2019-09-20 RX ADMIN — FENTANYL CITRATE 25 MCG: 50 INJECTION, SOLUTION INTRAMUSCULAR; INTRAVENOUS at 16:56

## 2019-09-20 RX ADMIN — MIDAZOLAM 0.5 MG: 1 INJECTION INTRAMUSCULAR; INTRAVENOUS at 13:35

## 2019-09-20 RX ADMIN — HEPARIN SODIUM 5000 UNITS: 1000 INJECTION INTRAVENOUS; SUBCUTANEOUS at 14:32

## 2019-09-20 RX ADMIN — MIDAZOLAM 0.5 MG: 1 INJECTION INTRAMUSCULAR; INTRAVENOUS at 15:22

## 2019-09-20 RX ADMIN — HYDRALAZINE HYDROCHLORIDE 5 MG: 20 INJECTION INTRAMUSCULAR; INTRAVENOUS at 15:14

## 2019-09-20 RX ADMIN — IODIXANOL 150 ML: 320 INJECTION, SOLUTION INTRAVASCULAR at 17:47

## 2019-09-20 RX ADMIN — FENTANYL CITRATE 25 MCG: 50 INJECTION, SOLUTION INTRAMUSCULAR; INTRAVENOUS at 16:32

## 2019-09-20 RX ADMIN — NITROGLYCERIN 100 MCG: 20 INJECTION INTRAVENOUS at 16:34

## 2019-09-20 RX ADMIN — NITROGLYCERIN 100 MCG: 20 INJECTION INTRAVENOUS at 16:24

## 2019-09-20 RX ADMIN — HEPARIN SODIUM 1000 UNITS: 1000 INJECTION INTRAVENOUS; SUBCUTANEOUS at 16:29

## 2019-09-20 RX ADMIN — NITROGLYCERIN 4 ML: 20 INJECTION INTRAVENOUS at 15:47

## 2019-09-20 RX ADMIN — FENTANYL CITRATE 25 MCG: 50 INJECTION, SOLUTION INTRAMUSCULAR; INTRAVENOUS at 13:32

## 2019-09-20 RX ADMIN — MIDAZOLAM 0.5 MG: 1 INJECTION INTRAMUSCULAR; INTRAVENOUS at 16:50

## 2019-09-20 RX ADMIN — NITROGLYCERIN 100 MCG: 20 INJECTION INTRAVENOUS at 16:20

## 2019-09-20 RX ADMIN — MIDAZOLAM 0.5 MG: 1 INJECTION INTRAMUSCULAR; INTRAVENOUS at 13:32

## 2019-09-20 RX ADMIN — CEFAZOLIN 1000 MG: 1 INJECTION, POWDER, FOR SOLUTION INTRAVENOUS at 13:53

## 2019-09-20 RX ADMIN — MIDAZOLAM 0.5 MG: 1 INJECTION INTRAMUSCULAR; INTRAVENOUS at 16:09

## 2019-09-20 RX ADMIN — Medication 5 ML: at 13:49

## 2019-09-20 RX ADMIN — FENTANYL CITRATE 50 MCG: 50 INJECTION, SOLUTION INTRAMUSCULAR; INTRAVENOUS at 17:06

## 2019-09-20 RX ADMIN — MIDAZOLAM 0.5 MG: 1 INJECTION INTRAMUSCULAR; INTRAVENOUS at 16:17

## 2019-09-20 RX ADMIN — HEPARIN SODIUM 1000 UNITS: 1000 INJECTION INTRAVENOUS; SUBCUTANEOUS at 15:30

## 2019-09-20 RX ADMIN — NITROGLYCERIN 100 MCG: 20 INJECTION INTRAVENOUS at 16:57

## 2019-09-20 RX ADMIN — MIDAZOLAM 0.5 MG: 1 INJECTION INTRAMUSCULAR; INTRAVENOUS at 15:13

## 2019-09-20 RX ADMIN — MIDAZOLAM 0.5 MG: 1 INJECTION INTRAMUSCULAR; INTRAVENOUS at 13:48

## 2019-09-20 RX ADMIN — FENTANYL CITRATE 25 MCG: 50 INJECTION, SOLUTION INTRAMUSCULAR; INTRAVENOUS at 13:35

## 2019-09-20 RX ADMIN — FENTANYL CITRATE 25 MCG: 50 INJECTION, SOLUTION INTRAMUSCULAR; INTRAVENOUS at 15:41

## 2019-09-20 RX ADMIN — FENTANYL CITRATE 25 MCG: 50 INJECTION, SOLUTION INTRAMUSCULAR; INTRAVENOUS at 15:49

## 2019-09-20 NOTE — PROGRESS NOTES
Interventional Radiology Preprocedure Note    History/Indication for procedure:   Alex Benedict is a [de-identified] y o  male with peripheral arterial disease resulting in rest pain and ulceration of the right lower extremity    Relevant past medical history:    Past Medical History:   Diagnosis Date    Basal cell carcinoma, ear, unspecified laterality     LAST ASSESSED: 27OCT2015    Hyperlipidemia     Hypertension     Inguinal hernia     RESOLVED: [id number]    Malignant neoplasm of prostate ([address] )     RESOLVED: [id number]    Prostate enlargement     RESOLVED: 27OCT2015    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, left     LAST ASSESSED: 27OCT2015     Patient Active Problem List   Diagnosis    Inguinal hernia of right side with obstruction    Benign essential hypertension    Prostate cancer (HCC)    Hypercholesterolemia    Peripheral arterial disease (HCC)    Chronic venous insufficiency       BP (!) 211/91   Pulse 73   Resp 18   Ht 5' 5" (1 651 m)   Wt 73 9 kg (163 lb)   SpO2 98%   BMI 27 12 kg/m²     Medications:    Inpatient Medications:     Scheduled Medications:    Current Facility-Administered Medications:  fentanyl citrate (PF)  Intravenous Code/Trauma/Sedation Demar Shepard MD    midazolam   Code/Trauma/Sedation Demar Shepard MD    sodium chloride 100 mL/hr Intravenous Continuous Sonia KAYLIN Dickerson Last Rate: 100 mL/hr (09/20/19 1226)       Infusions:    sodium chloride 100 mL/hr Last Rate: 100 mL/hr (09/20/19 1226)       PRN:    fentanyl citrate (PF)    midazolam    Outpatient Medications:  No current facility-administered medications on file prior to encounter        Current Outpatient Medications on File Prior to Encounter   Medication Sig Dispense Refill    atenolol (TENORMIN) 25 mg tablet Take 0 5 tablets (12 5 mg total) by mouth daily for 180 days 45 tablet 1    bimatoprost (LUMIGAN) 0 01 % ophthalmic drops Administer 1 drop to both eyes daily at bedtime      brinzolamide (AZOPT) 1 % ophthalmic suspension Administer 1 drop to both eyes 2 (two) times a day      losartan (COZAAR) 50 mg tablet Take 1 tablet (50 mg total) by mouth daily for 180 days 90 tablet 1    multivitamin (THERAGRAN) TABS Take 1 tablet by mouth daily      SANTYL ointment APPLY A NICKEL THICK LAYER TO WOUND DAILY - OR MORE FREQUENTLY IF WOUND BECOMES SOILED  0    silver sulfadiazine (SILVADENE,SSD) 1 % cream Apply topically daily (Patient not taking: Reported on 9/16/2019) 50 g 2    simvastatin (ZOCOR) 20 mg tablet Take 1 tablet (20 mg total) by mouth daily at bedtime for 180 days 90 tablet 1    traMADol (ULTRAM) 50 mg tablet Take 1 tablet (50 mg total) by mouth every 8 (eight) hours as needed for moderate pain 30 tablet 1    triamcinolone (KENALOG) 0 5 % cream Apply topically 3 (three) times a day (Patient not taking: Reported on 9/16/2019) 30 g 1    [DISCONTINUED] cephalexin (KEFLEX) 500 mg capsule take 1 capsule by mouth four times a day for 7 days  0       Allergies   Allergen Reactions    Other      SESAME SEEDS         Anticoagulants:  Aspirin    ASA classification: ASA 3 - Patient with moderate systemic disease with functional limitations    Airway Assessment: II (hard and soft palate, upper portion of tonsils anduvula visible)    Relevant family history: None    Relevant review of systems: None    Prior sedation/anesthesia: yes    Can the patient lie flat? Yes     Does patient have sleep apnea?  No    If yes, does patient use CPAP? not applicable    NPO Status: yes    Labs:   CBC with diff: Lab Results   Component Value Date    WBC 16 46 (H) 09/18/2019    HGB 13 6 09/18/2019    HCT 42 8 09/18/2019    MCV 94 09/18/2019     09/18/2019    MCH 29 9 09/18/2019    MCHC 31 8 09/18/2019    RDW 13 8 09/18/2019    MPV 11 0 09/18/2019    NRBC 0 09/18/2019     BMP/CMP:  Lab Results   Component Value Date     12/09/2016    K 4 7 09/18/2019    K 4 1 12/09/2016     09/18/2019    CL 99 12/09/2016    CO2 29 09/18/2019    CO2 27 12/09/2016    ANIONGAP 6 05/30/2014    BUN 31 (H) 09/18/2019    BUN 24 12/09/2016    CREATININE 1 19 09/18/2019    CREATININE 1 10 12/09/2016    GLUCOSE 87 05/30/2014    CALCIUM 9 3 09/18/2019    CALCIUM 9 0 12/09/2016    AST 24 12/04/2018    AST 20 12/09/2016    ALT 32 12/04/2018    ALT 21 12/09/2016    ALKPHOS 80 12/04/2018    ALKPHOS 56 12/09/2016    PROT 6 1 12/09/2016    BILITOT 0 6 12/09/2016    EGFR 57 09/18/2019   ,     Coags:   Lab Results   Component Value Date    PTT 27 09/18/2019    INR 0 95 09/18/2019   ,   Results from last 7 days   Lab Units 09/18/19  1212   PTT seconds 27   INR  0 95        Relevant imaging studies:   CT 12/3/16 reviewed    Directed physical examination:  NAD  RRR  CTAB  Pulses intact    Assessment/Plan:   RLE angiogram and possible intervention    Sedation/Anesthesia plan: Moderate sedation will be used as needed for procedure      Consent with alternatives to the procedure, risks and benefits have been explained and discussed with the patient/patient's family: yes

## 2019-09-20 NOTE — BRIEF OP NOTE (RAD/CATH)
IR ABDOMINAL ANGIOGRAPHY / INTERVENTION  Procedure Note    Subintimal recanalization of right SFA long segment chronic total occlusion  Angioplasty of the subintimal tract up to 4 mm  6 mm x 15 cm and 6 mm x 4 cm bare metal stent deployment with 1 cm overlap along the subintimal tract  Post dilation of stents up to 5 mm distally and 5 5 mm proximally  Angioplasty of the tibial peroneal trunk and proximal peroneal artery up to 2 5 mm  Angioplasty of severe ostial stenosis of the anterior tibial artery up to 2 5 mm    PATIENT NAME: Emily Shaw  : 1940  MRN: [id number]     Pre-op Diagnosis:   1  Peripheral arterial disease (HCC)      Post-op Diagnosis:   1  Peripheral arterial disease ([address] )        Surgeon:   Aspen Mcguire MD     Assistants:     No qualified resident was available, Resident is only observing    Estimated Blood Loss:  Minimal  Findings:   Long segment (greater than 10 cm) chronic total occlusion of the distal SFA with marked collateralization  Severe focal stenosis at the origin of anterior tibial artery  Severe multi focal stenosis along the tibioperoneal trunk and the proximal peroneal artery  There are multifocal stenoses along the remainder of the peroneal artery and the artery remains visible and communicates with the anterior tibial artery distally  The posterior tibial artery was completely occluded  There is a single vessel inflow into the foot supplied solely by the anterior tibial artery  The plantar pedal arch was not intact  At their intervention, the SFA was widely patent  There was significant improvement of the stenosis at the origin of the anterior tibial artery  The multifocal stenoses along the tibioperoneal trunk and the proximal peroneal artery remained recalcitrant and persistent despite multiple sessions of angioplasty  There was improved, slightly more brisk inflow through the anterior tibial artery into the foot following intervention        Specimens: None    Complications:  None    Anesthesia: Conscious sedation    Curtsi Pereira MD     Date: 9/20/2019  Time: 5:19 PM

## 2019-09-20 NOTE — SEDATION DOCUMENTATION
IR Procedure Bedrest Start Time is [id number]  Left leg immobilizer placed  Site CDI   Report called to [facility] RN

## 2019-09-20 NOTE — DISCHARGE INSTRUCTIONS
ARTERIOGRAM    WHAT YOU SHOULD KNOW:   An angiogram is a procedure to look at arteries in your body  Arteries are the blood vessels that carry blood from your heart to your body  AFTER YOU LEAVE:     Self-care:   · Limit activity: Rest for the remainder of the day of your procedure  Have some one with you until the next morning  Keep your arm or leg straight as much as possible  Rest as much as possible, sitting lying or reclining  Walk only to go to the bathroom, to bed or to eat  If the angiogram catheter was put in your leg, use the stairs as little as possible  No driving  · Keep your wound clean and dry  You may shower 24 hours after your procedure  The bandage you have on should fall off in 2-3 days  If there is any drainage from the puncture site, you should put on a clean bandage  · Watch for bleeding and bruising: It is normal to have a bruise and soreness where the angiogram catheter went in  · Diet:   · You may resume your regular diet, Sips of flat soda will help with mild nausea  · Drink more liquids than usual for the next 24 hours      · IMMEDIATELY Contact Interventional Radiology at [phone] Alberto PATIENTS: Contact Interventional Radiology at [phone]) Jose G Gonzalez PATIENTS: Contact Interventional Radiology at [phone]) if any of the following occur:  · If your bruise gets larger or if you notice any active bleeding  APPLY DIRECT PRESSURE TO THE BLEEDING SITE  · If you notice increased swelling or have increased pain at the puncture site   · If you have any numbness or pain in the extremity of the puncture site   · If that extremity seems cold or pale      · You have fever greater than 101  · Persistent nausea or vomitting    Follow up with your primary healthcare provider  as directed: Write down your questions so you remember to ask them during your visits

## 2019-09-27 ENCOUNTER — APPOINTMENT (OUTPATIENT)
Dept: WOUND CARE | Facility: HOSPITAL | Age: 79
End: 2019-09-27
Payer: COMMERCIAL

## 2019-09-27 PROCEDURE — 97597 DBRDMT OPN WND 1ST 20 CM/<: CPT

## 2019-10-04 ENCOUNTER — APPOINTMENT (OUTPATIENT)
Dept: WOUND CARE | Facility: HOSPITAL | Age: 79
End: 2019-10-04
Payer: COMMERCIAL

## 2019-10-04 PROCEDURE — 97597 DBRDMT OPN WND 1ST 20 CM/<: CPT

## 2019-10-08 ENCOUNTER — OFFICE VISIT (OUTPATIENT)
Dept: VASCULAR SURGERY | Facility: CLINIC | Age: 79
End: 2019-10-08
Payer: COMMERCIAL

## 2019-10-08 VITALS
SYSTOLIC BLOOD PRESSURE: 132 MMHG | DIASTOLIC BLOOD PRESSURE: 78 MMHG | HEART RATE: 74 BPM | RESPIRATION RATE: 16 BRPM | WEIGHT: 167 LBS | BODY MASS INDEX: 27.82 KG/M2 | HEIGHT: 65 IN

## 2019-10-08 DIAGNOSIS — I87.2 CHRONIC VENOUS INSUFFICIENCY: Primary | ICD-10-CM

## 2019-10-08 DIAGNOSIS — I73.9 PERIPHERAL ARTERIAL DISEASE (HCC): ICD-10-CM

## 2019-10-08 PROCEDURE — 99213 OFFICE O/P EST LOW 20 MIN: CPT | Performed by: SURGERY

## 2019-10-08 NOTE — ASSESSMENT & PLAN NOTE
Successful percutaneous revascularization now with robust signal  Patient no longer with ischemic rest pain

## 2019-10-08 NOTE — ASSESSMENT & PLAN NOTE
Patient with successful arterial revascularization  If the wound fails to heal within reasonable  I have time will pursue venous valvular insufficiency study with appropriate treatment  Now that arterial insufficiency has been largely corrected will initiate gentle compression with Tubigrip

## 2019-10-08 NOTE — PROGRESS NOTES
Assessment/Plan:    Chronic venous insufficiency  Patient with successful arterial revascularization  If the wound fails to heal within reasonable  I have time will pursue venous valvular insufficiency study with appropriate treatment  Now that arterial insufficiency has been largely corrected will initiate gentle compression with Tubigrip  Peripheral arterial disease ([address] )  Successful percutaneous revascularization now with robust signal  Patient no longer with ischemic rest pain  Diagnoses and all orders for this visit:    Chronic venous insufficiency    Peripheral arterial disease ([address] )    Other orders  -     aspirin (ECOTRIN) 325 mg EC tablet; Take 650 mg by mouth every 6 (six) hours as needed for mild pain          Subjective:      Patient ID: Mere Guzman is a [de-identified] y o  male  Patient is s/p Mukulam on 9/20/19 with successful revascularization  Pt states that the ulcer on the right ankle is healing  Pt has occasional pain in the right leg  Pt has occasional pain when walking  Previously patient was unable to tolerate compression, likely due to ischemia  However we will re-attempt applying Tubigrip compression  Patient was advised to elevate his leg as much as possible  He will continue with weekly wound care visits at the [facility]  Pt is on Simvastatin and  mg twice a day  The following portions of the patient's history were reviewed and updated as appropriate: allergies, current medications, past family history, past medical history, past social history, past surgical history and problem list     Review of Systems   Constitutional: Negative  HENT: Negative  Eyes: Negative  Respiratory: Negative  Cardiovascular: Negative  Gastrointestinal: Negative  Endocrine: Negative  Genitourinary: Negative  Musculoskeletal: Positive for gait problem  Skin: Positive for color change and wound  Allergic/Immunologic: Negative      Neurological: Negative for dizziness, weakness and numbness  Hematological: Negative  Psychiatric/Behavioral: Negative  Objective:      /78 (BP Location: Left arm, Patient Position: Sitting)   Pulse 74   Resp 16   Ht 5' 5" (1 651 m)   Wt 75 8 kg (167 lb)   BMI 27 79 kg/m²          Physical Exam   Constitutional: He is oriented to person, place, and time  He appears well-developed and well-nourished  HENT:   Head: Normocephalic and atraumatic  Mouth/Throat: Oropharynx is clear and moist    Eyes: Pupils are equal, round, and reactive to light  Conjunctivae and EOM are normal    Neck: Normal range of motion  Neck supple  No JVD present  Cardiovascular: Normal rate, regular rhythm and normal heart sounds  Robust biphasic Doppler signal at the right DP  Pulmonary/Chest: Effort normal and breath sounds normal  No stridor  No respiratory distress  He has no wheezes  He has no rales  He exhibits no tenderness  Abdominal: Soft  He exhibits no distension and no mass  There is no tenderness  There is no rebound and no guarding  Musculoskeletal: Normal range of motion  He exhibits no tenderness or deformity  Neurological: He is alert and oriented to person, place, and time  Skin: Skin is warm and dry  No rash noted  No erythema  Psychiatric: He has a normal mood and affect  His behavior is normal  Thought content normal    Nursing note and vitals reviewed

## 2019-10-08 NOTE — LETTER
October 8, 2019     Lina Senior DO  [facility] [address] [place] [address]    Patient: Shahana Bonilla   YOB: 1940   Date of Visit: 10/8/2019       Dear Dr Kathrene Prader: Thank you for referring Juaquin Silverio to me for evaluation  Below are the relevant portions of my assessment and plan of care  Patient is s/p Agram on 9/20/19 with successful revascularization  Pt states that the ulcer on the right ankle is healing  Pt has occasional pain in the right leg  Pt has occasional pain when walking  Previously patient was unable to tolerate compression, likely due to ischemia  However we will re-attempt applying Tubigrip compression  Patient was advised to elevate his leg as much as possible  He will continue with weekly wound care visits at the [facility]  Pt is on Simvastatin and  mg twice a day  Assessment/Plan:    Chronic venous insufficiency  Patient with successful arterial revascularization  If the wound fails to heal within reasonable  I have time will pursue venous valvular insufficiency study with appropriate treatment  Now that arterial insufficiency has been largely corrected will initiate gentle compression with Tubigrip  Peripheral arterial disease ([address] )  Successful percutaneous revascularization now with robust signal  Patient no longer with ischemic rest pain  If you have questions, please do not hesitate to call me  I look forward to following Joe Garcia along with you        Sincerely,      Fawn Edward MD    CC: MD Nelly Ryan MD

## 2019-10-15 ENCOUNTER — OFFICE VISIT (OUTPATIENT)
Dept: FAMILY MEDICINE CLINIC | Facility: CLINIC | Age: 79
End: 2019-10-15
Payer: COMMERCIAL

## 2019-10-15 VITALS
WEIGHT: 166.2 LBS | TEMPERATURE: 98 F | OXYGEN SATURATION: 98 % | SYSTOLIC BLOOD PRESSURE: 160 MMHG | HEIGHT: 65 IN | HEART RATE: 70 BPM | BODY MASS INDEX: 27.69 KG/M2 | DIASTOLIC BLOOD PRESSURE: 80 MMHG

## 2019-10-15 DIAGNOSIS — C61 PROSTATE CANCER (HCC): ICD-10-CM

## 2019-10-15 DIAGNOSIS — I87.2 CHRONIC VENOUS INSUFFICIENCY: ICD-10-CM

## 2019-10-15 DIAGNOSIS — E78.00 HYPERCHOLESTEREMIA: ICD-10-CM

## 2019-10-15 DIAGNOSIS — I73.9 PERIPHERAL ARTERIAL DISEASE (HCC): ICD-10-CM

## 2019-10-15 DIAGNOSIS — I10 BENIGN ESSENTIAL HYPERTENSION: Primary | ICD-10-CM

## 2019-10-15 DIAGNOSIS — I10 ESSENTIAL HYPERTENSION: ICD-10-CM

## 2019-10-15 PROCEDURE — 99215 OFFICE O/P EST HI 40 MIN: CPT | Performed by: FAMILY MEDICINE

## 2019-10-15 RX ORDER — IRBESARTAN 150 MG/1
150 TABLET ORAL
Qty: 90 TABLET | Refills: 1 | Status: SHIPPED | OUTPATIENT
Start: 2019-10-15 | End: 2020-03-31 | Stop reason: SDUPTHER

## 2019-10-15 RX ORDER — SIMVASTATIN 20 MG
20 TABLET ORAL
Qty: 90 TABLET | Refills: 1 | Status: SHIPPED | OUTPATIENT
Start: 2019-10-15 | End: 2020-07-07

## 2019-10-15 RX ORDER — ATENOLOL 25 MG/1
12.5 TABLET ORAL DAILY
Qty: 45 TABLET | Refills: 1 | Status: SHIPPED | OUTPATIENT
Start: 2019-10-15 | End: 2020-03-31 | Stop reason: SDUPTHER

## 2019-10-15 NOTE — PROGRESS NOTES
Chief Complaint   Patient presents with    Follow-up     talk about medication for BP     Hypertension    Hearing Loss    Restless Leg     Assessment/Plan:  Start Avapro  Walking schedule  Tramadol can cause a jittery feeling  Diagnoses and all orders for this visit:    Benign essential hypertension    Essential hypertension  -     atenolol (TENORMIN) 25 mg tablet; Take 0 5 tablets (12 5 mg total) by mouth daily  -     irbesartan (AVAPRO) 150 mg tablet; Take 1 tablet (150 mg total) by mouth daily at bedtime    Peripheral arterial disease (HCC)    Chronic venous insufficiency    Prostate cancer (HCC)    Hypercholesteremia  -     simvastatin (ZOCOR) 20 mg tablet; Take 1 tablet (20 mg total) by mouth daily at bedtime          Subjective:      Patient ID: Shahana Bonilla is a [de-identified] y o  male  Follow up  Had stents RLE  Able to walk longer now without cramping  Making progress with wound care  Moving legs HS, R > L  Started recently  Patient not taking Losartan since surgery, heard negative reports  Will change to Avapro  Urinating OK  Decreased Tramadol use HS for pain  Patient and wife with multiple questions about wound, stents, BP control and leg movements at HS  The following portions of the patient's history were reviewed and updated as appropriate: allergies, current medications, past medical history, past surgical history and problem list   I have spent 40 minutes with Patient and family today in which greater than 50% of this time was spent in counseling/coordination of care regarding Diagnostic results, Prognosis, Risks and benefits of tx options, Intructions for management, Patient and family education, Importance of tx compliance, Risk factor reductions and Impressions  Review of Systems   Constitutional: Negative  HENT: Negative  Eyes: Negative  Respiratory: Negative  Cardiovascular: Negative  Gastrointestinal: Negative  Genitourinary: Negative  Musculoskeletal:        Right calf swelling  Skin: Positive for wound  Neurological: Negative  Psychiatric/Behavioral: Negative  Objective:      /82 (BP Location: Left arm, Patient Position: Sitting, Cuff Size: Standard)   Pulse 70   Temp 98 °F (36 7 °C) (Oral)   Ht 5' 5" (1 651 m)   Wt 75 4 kg (166 lb 3 2 oz)   SpO2 98%   BMI 27 66 kg/m²       Current Outpatient Medications:     aspirin (ECOTRIN) 325 mg EC tablet, Take 650 mg by mouth every 6 (six) hours as needed for mild pain, Disp: , Rfl:     atenolol (TENORMIN) 25 mg tablet, Take 0 5 tablets (12 5 mg total) by mouth daily for 180 days, Disp: 45 tablet, Rfl: 1    bimatoprost (LUMIGAN) 0 01 % ophthalmic drops, Administer 1 drop to both eyes daily at bedtime, Disp: , Rfl:     brinzolamide (AZOPT) 1 % ophthalmic suspension, Administer 1 drop to both eyes 2 (two) times a day, Disp: , Rfl:     multivitamin (THERAGRAN) TABS, Take 1 tablet by mouth daily, Disp: , Rfl:     SANTYL ointment, APPLY A NICKEL THICK LAYER TO WOUND DAILY - OR MORE FREQUENTLY IF WOUND BECOMES SOILED, Disp: , Rfl: 0    simvastatin (ZOCOR) 20 mg tablet, Take 1 tablet (20 mg total) by mouth daily at bedtime for 180 days, Disp: 90 tablet, Rfl: 1    traMADol (ULTRAM) 50 mg tablet, Take 1 tablet (50 mg total) by mouth every 8 (eight) hours as needed for moderate pain, Disp: 30 tablet, Rfl: 1    losartan (COZAAR) 50 mg tablet, Take 1 tablet (50 mg total) by mouth daily for 180 days (Patient not taking: Reported on 10/8/2019), Disp: 90 tablet, Rfl: 1     Allergies   Allergen Reactions    Other      SESAME SEEDS          Physical Exam   Constitutional: He is oriented to person, place, and time  He appears well-developed and well-nourished  HENT:   Head: Normocephalic and atraumatic     Right Ear: External ear normal    Left Ear: External ear normal    Nose: Nose normal    Mouth/Throat: Oropharynx is clear and moist    Eyes: Pupils are equal, round, and reactive to light  Conjunctivae and EOM are normal    Neck: Normal range of motion  Neck supple  Cardiovascular: Normal rate, regular rhythm and normal heart sounds  Pulmonary/Chest: Effort normal and breath sounds normal    Abdominal: Soft  Bowel sounds are normal    Neurological: He is alert and oriented to person, place, and time  He has normal reflexes  Skin: Skin is warm and dry  Psychiatric: He has a normal mood and affect   His behavior is normal  Judgment and thought content normal

## 2019-10-18 ENCOUNTER — APPOINTMENT (OUTPATIENT)
Dept: WOUND CARE | Facility: HOSPITAL | Age: 79
End: 2019-10-18
Payer: COMMERCIAL

## 2019-10-18 PROCEDURE — 11042 DBRDMT SUBQ TIS 1ST 20SQCM/<: CPT

## 2019-10-25 ENCOUNTER — APPOINTMENT (OUTPATIENT)
Dept: WOUND CARE | Facility: HOSPITAL | Age: 79
End: 2019-10-25
Payer: COMMERCIAL

## 2019-10-25 PROCEDURE — 11042 DBRDMT SUBQ TIS 1ST 20SQCM/<: CPT

## 2019-11-15 ENCOUNTER — APPOINTMENT (OUTPATIENT)
Dept: WOUND CARE | Facility: HOSPITAL | Age: 79
End: 2019-11-15
Payer: COMMERCIAL

## 2019-11-15 PROCEDURE — 97597 DBRDMT OPN WND 1ST 20 CM/<: CPT

## 2019-12-03 ENCOUNTER — OFFICE VISIT (OUTPATIENT)
Dept: FAMILY MEDICINE CLINIC | Facility: CLINIC | Age: 79
End: 2019-12-03
Payer: COMMERCIAL

## 2019-12-03 VITALS
WEIGHT: 169.2 LBS | HEIGHT: 65 IN | HEART RATE: 65 BPM | BODY MASS INDEX: 28.19 KG/M2 | TEMPERATURE: 97.8 F | OXYGEN SATURATION: 98 % | SYSTOLIC BLOOD PRESSURE: 140 MMHG | DIASTOLIC BLOOD PRESSURE: 70 MMHG | RESPIRATION RATE: 16 BRPM

## 2019-12-03 DIAGNOSIS — I10 ESSENTIAL HYPERTENSION: ICD-10-CM

## 2019-12-03 DIAGNOSIS — I73.9 PERIPHERAL ARTERIAL DISEASE (HCC): ICD-10-CM

## 2019-12-03 DIAGNOSIS — R09.89 BRUIT OF LEFT CAROTID ARTERY: ICD-10-CM

## 2019-12-03 DIAGNOSIS — H25.13 AGE-RELATED NUCLEAR CATARACT OF BOTH EYES: ICD-10-CM

## 2019-12-03 DIAGNOSIS — E78.00 HYPERCHOLESTEROLEMIA: ICD-10-CM

## 2019-12-03 DIAGNOSIS — R06.02 SOB (SHORTNESS OF BREATH) ON EXERTION: ICD-10-CM

## 2019-12-03 DIAGNOSIS — Z01.818 PRE-OPERATIVE CLEARANCE: Primary | ICD-10-CM

## 2019-12-03 PROCEDURE — 99244 OFF/OP CNSLTJ NEW/EST MOD 40: CPT | Performed by: FAMILY MEDICINE

## 2019-12-03 PROCEDURE — 93000 ELECTROCARDIOGRAM COMPLETE: CPT | Performed by: FAMILY MEDICINE

## 2019-12-03 RX ORDER — PREDNISOLONE ACETATE 10 MG/ML
SUSPENSION/ DROPS OPHTHALMIC
Refills: 0 | COMMUNITY
Start: 2019-11-15 | End: 2020-07-07

## 2019-12-03 RX ORDER — POLYMYXIN B SULFATE AND TRIMETHOPRIM 1; 10000 MG/ML; [USP'U]/ML
SOLUTION OPHTHALMIC
Refills: 0 | COMMUNITY
Start: 2019-11-15 | End: 2020-07-07

## 2019-12-03 NOTE — PROGRESS NOTES
Chief Complaint   Patient presents with    Pre-op Exam     right eye cataract surgery on 12/23/19 with Dr Negro Baldwin       Assessment/Plan:  Cleared for surgery  Stress, US carotids and follow up  Discussed benefits of checking arteries in neck and heart since having PVD  Diagnoses and all orders for this visit:    Pre-operative clearance  -     POCT ECG    Age-related nuclear cataract of both eyes  -     POCT ECG    Essential hypertension  -     POCT ECG    Peripheral arterial disease ([address] )  -     NM myocardial perfusion spect (stress and/or rest); Future  -     VAS carotid complete study; Future  -     POCT ECG    SOB (shortness of breath) on exertion  -     NM myocardial perfusion spect (stress and/or rest); Future  -     POCT ECG    Bruit of left carotid artery  -     VAS carotid complete study; Future  -     POCT ECG    Hypercholesterolemia    Other orders  -     polymyxin b-trimethoprim (POLYTRIM) ophthalmic solution; INSTILL 1 DROP INTO SURGERY EYE 4 TIMES A DAY, START 3 DAYS PRIOR     (REFER TO PRESCRIPTION NOTES)  -     prednisoLONE acetate (PRED FORTE) 1 % ophthalmic suspension; INSTILL 1 DROP INTO SURGERY EYE 4 TIMES A DAY, START 3 DAYS PRIOR     (REFER TO PRESCRIPTION NOTES)  Subjective:      Patient ID: Jaime Lei is a [de-identified] y o  male  Vision changes and having cataracts replaced  PMH: PVD, HTN, hyperchol  No changes in Med's        The following portions of the patient's history were reviewed and updated as appropriate: allergies, current medications, past family history, past medical history, past social history, past surgical history and problem list   I have spent 60 minutes with Patient and family today in which greater than 50% of this time was spent in counseling/coordination of care regarding Diagnostic results, Prognosis, Risks and benefits of tx options, Intructions for management, Patient and family education, Risk factor reductions and Impressions  Review of Systems   Constitutional: Negative  HENT: Negative  Eyes: Positive for visual disturbance  Respiratory: Negative  Cardiovascular:        Dyspnea on exertion after walking one block, has to stop to rest    Gastrointestinal: Negative  Genitourinary: Negative  Musculoskeletal: Negative  Skin: Negative  Neurological: Negative  Psychiatric/Behavioral: Negative  Objective:      /70 (BP Location: Left arm, Patient Position: Sitting, Cuff Size: Standard)   Pulse 65   Temp 97 8 °F (36 6 °C) (Oral)   Resp 16   Ht 5' 5" (1 651 m)   Wt 76 7 kg (169 lb 3 2 oz)   SpO2 98%   BMI 28 16 kg/m²       Current Outpatient Medications:     aspirin (ECOTRIN) 325 mg EC tablet, Take 650 mg by mouth every 6 (six) hours as needed for mild pain, Disp: , Rfl:     atenolol (TENORMIN) 25 mg tablet, Take 0 5 tablets (12 5 mg total) by mouth daily, Disp: 45 tablet, Rfl: 1    bimatoprost (LUMIGAN) 0 01 % ophthalmic drops, Administer 1 drop to both eyes daily at bedtime, Disp: , Rfl:     brinzolamide (AZOPT) 1 % ophthalmic suspension, Administer 1 drop to both eyes 2 (two) times a day, Disp: , Rfl:     irbesartan (AVAPRO) 150 mg tablet, Take 1 tablet (150 mg total) by mouth daily at bedtime, Disp: 90 tablet, Rfl: 1    multivitamin (THERAGRAN) TABS, Take 1 tablet by mouth daily, Disp: , Rfl:     simvastatin (ZOCOR) 20 mg tablet, Take 1 tablet (20 mg total) by mouth daily at bedtime, Disp: 90 tablet, Rfl: 1    traMADol (ULTRAM) 50 mg tablet, Take 1 tablet (50 mg total) by mouth every 8 (eight) hours as needed for moderate pain, Disp: 30 tablet, Rfl: 1    polymyxin b-trimethoprim (POLYTRIM) ophthalmic solution, INSTILL 1 DROP INTO SURGERY EYE 4 TIMES A DAY, START 3 DAYS PRIOR     (REFER TO PRESCRIPTION NOTES)  , Disp: , Rfl: 0    prednisoLONE acetate (PRED FORTE) 1 % ophthalmic suspension, INSTILL 1 DROP INTO SURGERY EYE 4 TIMES A DAY, START 3 DAYS PRIOR     (REFER TO PRESCRIPTION NOTES)  , Disp: , Rfl: 0    SANTYL ointment, APPLY A NICKEL THICK LAYER TO WOUND DAILY - OR MORE FREQUENTLY IF WOUND BECOMES SOILED, Disp: , Rfl: 0     Physical Exam   Constitutional: He is oriented to person, place, and time  He appears well-developed and well-nourished  HENT:   Head: Normocephalic and atraumatic  Right Ear: External ear normal    Left Ear: External ear normal    Nose: Nose normal    Mouth/Throat: Oropharynx is clear and moist    Eyes: Conjunctivae and EOM are normal    BL pupil opacities  Neck: Normal range of motion  Neck supple  Cardiovascular: Normal rate, regular rhythm and normal heart sounds  Pulmonary/Chest: Effort normal and breath sounds normal    Abdominal: Soft  Bowel sounds are normal    Neurological: He is alert and oriented to person, place, and time  He has normal reflexes  Skin: Skin is warm and dry  Psychiatric: He has a normal mood and affect   His behavior is normal  Judgment and thought content normal

## 2019-12-13 ENCOUNTER — TRANSCRIBE ORDERS (OUTPATIENT)
Dept: ADMINISTRATIVE | Facility: HOSPITAL | Age: 79
End: 2019-12-13

## 2019-12-13 ENCOUNTER — APPOINTMENT (OUTPATIENT)
Dept: WOUND CARE | Facility: HOSPITAL | Age: 79
End: 2019-12-13
Payer: COMMERCIAL

## 2019-12-13 DIAGNOSIS — I87.311 IDIOPATHIC CHRONIC VENOUS HYPERTENSION OF RIGHT LOWER EXTREMITY WITH ULCER (HCC): Primary | ICD-10-CM

## 2019-12-13 DIAGNOSIS — L97.919 IDIOPATHIC CHRONIC VENOUS HYPERTENSION OF RIGHT LOWER EXTREMITY WITH ULCER (HCC): Primary | ICD-10-CM

## 2019-12-13 PROCEDURE — 97597 DBRDMT OPN WND 1ST 20 CM/<: CPT

## 2019-12-17 ENCOUNTER — HOSPITAL ENCOUNTER (OUTPATIENT)
Dept: NON INVASIVE DIAGNOSTICS | Facility: CLINIC | Age: 79
Discharge: HOME | End: 2019-12-17
Payer: COMMERCIAL

## 2019-12-17 DIAGNOSIS — I87.311 IDIOPATHIC CHRONIC VENOUS HYPERTENSION OF RIGHT LOWER EXTREMITY WITH ULCER (HCC): ICD-10-CM

## 2019-12-17 DIAGNOSIS — L97.919 IDIOPATHIC CHRONIC VENOUS HYPERTENSION OF RIGHT LOWER EXTREMITY WITH ULCER (HCC): ICD-10-CM

## 2019-12-17 PROCEDURE — 93923 UPR/LXTR ART STDY 3+ LVLS: CPT

## 2019-12-17 PROCEDURE — 93926 LOWER EXTREMITY STUDY: CPT

## 2019-12-18 PROCEDURE — 93926 LOWER EXTREMITY STUDY: CPT | Performed by: SURGERY

## 2019-12-18 PROCEDURE — 93922 UPR/L XTREMITY ART 2 LEVELS: CPT | Performed by: SURGERY

## 2019-12-20 ENCOUNTER — APPOINTMENT (OUTPATIENT)
Dept: WOUND CARE | Facility: HOSPITAL | Age: 79
End: 2019-12-20
Payer: COMMERCIAL

## 2019-12-20 PROCEDURE — 97597 DBRDMT OPN WND 1ST 20 CM/<: CPT

## 2019-12-27 ENCOUNTER — APPOINTMENT (OUTPATIENT)
Dept: WOUND CARE | Facility: HOSPITAL | Age: 79
End: 2019-12-27
Payer: COMMERCIAL

## 2019-12-27 PROCEDURE — 97597 DBRDMT OPN WND 1ST 20 CM/<: CPT

## 2020-01-03 ENCOUNTER — APPOINTMENT (OUTPATIENT)
Dept: WOUND CARE | Facility: HOSPITAL | Age: 80
End: 2020-01-03
Payer: COMMERCIAL

## 2020-01-03 PROCEDURE — 97597 DBRDMT OPN WND 1ST 20 CM/<: CPT

## 2020-01-07 ENCOUNTER — HOSPITAL ENCOUNTER (OUTPATIENT)
Dept: NON INVASIVE DIAGNOSTICS | Facility: CLINIC | Age: 80
Discharge: HOME | End: 2020-01-07
Payer: COMMERCIAL

## 2020-01-07 DIAGNOSIS — I73.9 PERIPHERAL ARTERIAL DISEASE (HCC): ICD-10-CM

## 2020-01-07 DIAGNOSIS — R09.89 BRUIT OF LEFT CAROTID ARTERY: ICD-10-CM

## 2020-01-07 PROCEDURE — 93880 EXTRACRANIAL BILAT STUDY: CPT | Performed by: SURGERY

## 2020-01-07 PROCEDURE — 93880 EXTRACRANIAL BILAT STUDY: CPT

## 2020-01-08 ENCOUNTER — TELEPHONE (OUTPATIENT)
Dept: FAMILY MEDICINE CLINIC | Facility: CLINIC | Age: 80
End: 2020-01-08

## 2020-01-08 DIAGNOSIS — I10 ESSENTIAL HYPERTENSION: Primary | ICD-10-CM

## 2020-01-08 DIAGNOSIS — I73.9 PVD (PERIPHERAL VASCULAR DISEASE) (HCC): ICD-10-CM

## 2020-01-08 NOTE — TELEPHONE ENCOUNTER
LM for patient per scheduling department, notified patient nuclear stress test scheduled 01/09/20 was cancelled due insurance denial for authorization for test  Automated message received from [facility] stating authorization denied and written notice will be sent to patient and our office

## 2020-01-10 ENCOUNTER — APPOINTMENT (OUTPATIENT)
Dept: WOUND CARE | Facility: HOSPITAL | Age: 80
End: 2020-01-10
Payer: COMMERCIAL

## 2020-01-10 PROCEDURE — 97597 DBRDMT OPN WND 1ST 20 CM/<: CPT

## 2020-01-17 ENCOUNTER — APPOINTMENT (OUTPATIENT)
Dept: WOUND CARE | Facility: HOSPITAL | Age: 80
End: 2020-01-17
Payer: COMMERCIAL

## 2020-01-17 PROCEDURE — 97597 DBRDMT OPN WND 1ST 20 CM/<: CPT

## 2020-01-22 ENCOUNTER — APPOINTMENT (OUTPATIENT)
Dept: WOUND CARE | Facility: HOSPITAL | Age: 80
End: 2020-01-22
Payer: COMMERCIAL

## 2020-01-22 PROCEDURE — 97597 DBRDMT OPN WND 1ST 20 CM/<: CPT

## 2020-01-31 ENCOUNTER — APPOINTMENT (OUTPATIENT)
Dept: WOUND CARE | Facility: HOSPITAL | Age: 80
End: 2020-01-31
Payer: COMMERCIAL

## 2020-01-31 PROCEDURE — 11042 DBRDMT SUBQ TIS 1ST 20SQCM/<: CPT

## 2020-02-07 ENCOUNTER — APPOINTMENT (OUTPATIENT)
Dept: WOUND CARE | Facility: HOSPITAL | Age: 80
End: 2020-02-07
Payer: COMMERCIAL

## 2020-02-07 PROCEDURE — 29581 APPL MULTLAYER CMPRN SYS LEG: CPT

## 2020-02-14 ENCOUNTER — APPOINTMENT (OUTPATIENT)
Dept: WOUND CARE | Facility: HOSPITAL | Age: 80
End: 2020-02-14
Payer: COMMERCIAL

## 2020-02-14 PROCEDURE — 97597 DBRDMT OPN WND 1ST 20 CM/<: CPT

## 2020-02-21 ENCOUNTER — APPOINTMENT (OUTPATIENT)
Dept: WOUND CARE | Facility: HOSPITAL | Age: 80
End: 2020-02-21
Payer: COMMERCIAL

## 2020-02-21 PROCEDURE — 97597 DBRDMT OPN WND 1ST 20 CM/<: CPT

## 2020-02-28 ENCOUNTER — APPOINTMENT (OUTPATIENT)
Dept: WOUND CARE | Facility: HOSPITAL | Age: 80
End: 2020-02-28
Payer: COMMERCIAL

## 2020-02-28 PROCEDURE — 97597 DBRDMT OPN WND 1ST 20 CM/<: CPT

## 2020-03-06 ENCOUNTER — APPOINTMENT (OUTPATIENT)
Dept: WOUND CARE | Facility: HOSPITAL | Age: 80
End: 2020-03-06
Payer: COMMERCIAL

## 2020-03-06 PROCEDURE — 97597 DBRDMT OPN WND 1ST 20 CM/<: CPT

## 2020-03-13 ENCOUNTER — APPOINTMENT (OUTPATIENT)
Dept: WOUND CARE | Facility: HOSPITAL | Age: 80
End: 2020-03-13
Payer: COMMERCIAL

## 2020-03-13 PROCEDURE — 97597 DBRDMT OPN WND 1ST 20 CM/<: CPT

## 2020-03-18 ENCOUNTER — APPOINTMENT (OUTPATIENT)
Dept: WOUND CARE | Facility: HOSPITAL | Age: 80
End: 2020-03-18
Payer: COMMERCIAL

## 2020-03-18 PROCEDURE — 97597 DBRDMT OPN WND 1ST 20 CM/<: CPT

## 2020-03-27 ENCOUNTER — APPOINTMENT (OUTPATIENT)
Dept: WOUND CARE | Facility: HOSPITAL | Age: 80
End: 2020-03-27
Payer: COMMERCIAL

## 2020-03-27 PROCEDURE — 15002 WOUND PREP TRK/ARM/LEG: CPT

## 2020-03-31 DIAGNOSIS — I10 ESSENTIAL HYPERTENSION: ICD-10-CM

## 2020-03-31 RX ORDER — ATENOLOL 25 MG/1
12.5 TABLET ORAL DAILY
Qty: 45 TABLET | Refills: 0 | Status: SHIPPED | OUTPATIENT
Start: 2020-03-31 | End: 2020-07-07

## 2020-03-31 RX ORDER — IRBESARTAN 150 MG/1
150 TABLET ORAL
Qty: 90 TABLET | Refills: 0 | Status: SHIPPED | OUTPATIENT
Start: 2020-03-31 | End: 2020-07-08 | Stop reason: SDUPTHER

## 2020-03-31 RX ORDER — IRBESARTAN 150 MG/1
TABLET ORAL
Qty: 90 TABLET | Refills: 3 | OUTPATIENT
Start: 2020-03-31

## 2020-03-31 RX ORDER — ATENOLOL 25 MG/1
TABLET ORAL
Qty: 45 TABLET | Refills: 3 | OUTPATIENT
Start: 2020-03-31

## 2020-04-03 ENCOUNTER — APPOINTMENT (OUTPATIENT)
Dept: WOUND CARE | Facility: HOSPITAL | Age: 80
End: 2020-04-03
Payer: COMMERCIAL

## 2020-04-03 PROCEDURE — 11042 DBRDMT SUBQ TIS 1ST 20SQCM/<: CPT

## 2020-04-09 ENCOUNTER — APPOINTMENT (OUTPATIENT)
Dept: WOUND CARE | Facility: HOSPITAL | Age: 80
End: 2020-04-09
Payer: COMMERCIAL

## 2020-04-09 PROCEDURE — 97597 DBRDMT OPN WND 1ST 20 CM/<: CPT

## 2020-04-14 ENCOUNTER — TELEMEDICINE (OUTPATIENT)
Dept: VASCULAR SURGERY | Facility: CLINIC | Age: 80
End: 2020-04-14
Payer: COMMERCIAL

## 2020-04-14 ENCOUNTER — TELEPHONE (OUTPATIENT)
Dept: VASCULAR SURGERY | Facility: CLINIC | Age: 80
End: 2020-04-14

## 2020-04-14 VITALS — BODY MASS INDEX: 27.49 KG/M2 | WEIGHT: 165 LBS | HEIGHT: 65 IN

## 2020-04-14 DIAGNOSIS — I73.9 PERIPHERAL ARTERIAL DISEASE (HCC): ICD-10-CM

## 2020-04-14 DIAGNOSIS — I87.2 CHRONIC VENOUS INSUFFICIENCY: Primary | ICD-10-CM

## 2020-04-14 DIAGNOSIS — I65.23 ASYMPTOMATIC BILATERAL CAROTID ARTERY STENOSIS: ICD-10-CM

## 2020-04-14 PROBLEM — I65.29 CAROTID STENOSIS, ASYMPTOMATIC: Status: ACTIVE | Noted: 2020-04-14

## 2020-04-14 PROCEDURE — 99214 OFFICE O/P EST MOD 30 MIN: CPT | Performed by: SURGERY

## 2020-04-17 ENCOUNTER — APPOINTMENT (OUTPATIENT)
Dept: WOUND CARE | Facility: HOSPITAL | Age: 80
End: 2020-04-17
Payer: COMMERCIAL

## 2020-04-17 PROCEDURE — 97597 DBRDMT OPN WND 1ST 20 CM/<: CPT

## 2020-05-05 ENCOUNTER — APPOINTMENT (OUTPATIENT)
Dept: WOUND CARE | Facility: HOSPITAL | Age: 80
End: 2020-05-05
Payer: COMMERCIAL

## 2020-05-05 PROCEDURE — 97597 DBRDMT OPN WND 1ST 20 CM/<: CPT

## 2020-05-19 ENCOUNTER — APPOINTMENT (OUTPATIENT)
Dept: WOUND CARE | Facility: HOSPITAL | Age: 80
End: 2020-05-19
Payer: COMMERCIAL

## 2020-05-19 PROCEDURE — 97597 DBRDMT OPN WND 1ST 20 CM/<: CPT

## 2020-06-02 ENCOUNTER — APPOINTMENT (OUTPATIENT)
Dept: WOUND CARE | Facility: HOSPITAL | Age: 80
End: 2020-06-02
Payer: COMMERCIAL

## 2020-06-02 PROCEDURE — 97597 DBRDMT OPN WND 1ST 20 CM/<: CPT

## 2020-06-09 ENCOUNTER — APPOINTMENT (OUTPATIENT)
Dept: WOUND CARE | Facility: HOSPITAL | Age: 80
End: 2020-06-09
Payer: COMMERCIAL

## 2020-06-09 PROCEDURE — 15271 SKIN SUB GRAFT TRNK/ARM/LEG: CPT

## 2020-06-09 PROCEDURE — 15002 WOUND PREP TRK/ARM/LEG: CPT

## 2020-06-15 ENCOUNTER — APPOINTMENT (OUTPATIENT)
Dept: WOUND CARE | Facility: HOSPITAL | Age: 80
End: 2020-06-15
Payer: COMMERCIAL

## 2020-06-15 ENCOUNTER — HOSPITAL ENCOUNTER (OUTPATIENT)
Dept: NON INVASIVE DIAGNOSTICS | Facility: CLINIC | Age: 80
Discharge: HOME | End: 2020-06-15
Payer: COMMERCIAL

## 2020-06-15 DIAGNOSIS — I87.2 CHRONIC VENOUS INSUFFICIENCY: ICD-10-CM

## 2020-06-15 PROCEDURE — 93971 EXTREMITY STUDY: CPT

## 2020-06-15 PROCEDURE — 99211 OFF/OP EST MAY X REQ PHY/QHP: CPT

## 2020-06-15 PROCEDURE — G0463 HOSPITAL OUTPT CLINIC VISIT: HCPCS

## 2020-06-16 ENCOUNTER — APPOINTMENT (OUTPATIENT)
Dept: WOUND CARE | Facility: HOSPITAL | Age: 80
End: 2020-06-16
Payer: COMMERCIAL

## 2020-06-16 PROCEDURE — 99212 OFFICE O/P EST SF 10 MIN: CPT

## 2020-06-16 PROCEDURE — G0463 HOSPITAL OUTPT CLINIC VISIT: HCPCS

## 2020-06-17 PROCEDURE — 93971 EXTREMITY STUDY: CPT | Performed by: SURGERY

## 2020-06-18 ENCOUNTER — APPOINTMENT (OUTPATIENT)
Dept: LAB | Facility: HOSPITAL | Age: 80
End: 2020-06-18
Payer: COMMERCIAL

## 2020-06-18 DIAGNOSIS — C61 PROSTATE CANCER (HCC): ICD-10-CM

## 2020-06-18 LAB — PSA SERPL-MCNC: <0.1 NG/ML (ref 0–4)

## 2020-06-18 PROCEDURE — 84153 ASSAY OF PSA TOTAL: CPT

## 2020-06-23 ENCOUNTER — APPOINTMENT (OUTPATIENT)
Dept: WOUND CARE | Facility: HOSPITAL | Age: 80
End: 2020-06-23
Payer: COMMERCIAL

## 2020-06-23 PROCEDURE — 15271 SKIN SUB GRAFT TRNK/ARM/LEG: CPT

## 2020-06-23 PROCEDURE — 99213 OFFICE O/P EST LOW 20 MIN: CPT

## 2020-06-23 PROCEDURE — 97597 DBRDMT OPN WND 1ST 20 CM/<: CPT

## 2020-06-23 PROCEDURE — G0463 HOSPITAL OUTPT CLINIC VISIT: HCPCS

## 2020-06-29 DIAGNOSIS — I10 ESSENTIAL HYPERTENSION: ICD-10-CM

## 2020-06-29 RX ORDER — ATENOLOL 25 MG/1
TABLET ORAL
Qty: 45 TABLET | Refills: 3 | OUTPATIENT
Start: 2020-06-29

## 2020-06-29 RX ORDER — IRBESARTAN 150 MG/1
TABLET ORAL
Qty: 90 TABLET | Refills: 3 | OUTPATIENT
Start: 2020-06-29

## 2020-06-30 ENCOUNTER — TELEPHONE (OUTPATIENT)
Dept: VASCULAR SURGERY | Facility: CLINIC | Age: 80
End: 2020-06-30

## 2020-06-30 ENCOUNTER — APPOINTMENT (OUTPATIENT)
Dept: WOUND CARE | Facility: HOSPITAL | Age: 80
End: 2020-06-30
Payer: COMMERCIAL

## 2020-06-30 PROCEDURE — 99213 OFFICE O/P EST LOW 20 MIN: CPT

## 2020-06-30 PROCEDURE — G0463 HOSPITAL OUTPT CLINIC VISIT: HCPCS

## 2020-07-01 ENCOUNTER — PREP FOR PROCEDURE (OUTPATIENT)
Dept: VASCULAR SURGERY | Facility: CLINIC | Age: 80
End: 2020-07-01

## 2020-07-01 ENCOUNTER — TELEPHONE (OUTPATIENT)
Dept: ADMINISTRATIVE | Facility: HOSPITAL | Age: 80
End: 2020-07-01

## 2020-07-01 ENCOUNTER — TELEPHONE (OUTPATIENT)
Dept: VASCULAR SURGERY | Facility: CLINIC | Age: 80
End: 2020-07-01

## 2020-07-01 ENCOUNTER — OFFICE VISIT (OUTPATIENT)
Dept: VASCULAR SURGERY | Facility: CLINIC | Age: 80
End: 2020-07-01
Payer: COMMERCIAL

## 2020-07-01 VITALS
HEIGHT: 66 IN | RESPIRATION RATE: 16 BRPM | DIASTOLIC BLOOD PRESSURE: 68 MMHG | HEART RATE: 68 BPM | SYSTOLIC BLOOD PRESSURE: 124 MMHG | WEIGHT: 168 LBS | BODY MASS INDEX: 27 KG/M2 | TEMPERATURE: 97.8 F

## 2020-07-01 DIAGNOSIS — I65.23 ASYMPTOMATIC BILATERAL CAROTID ARTERY STENOSIS: ICD-10-CM

## 2020-07-01 DIAGNOSIS — E78.00 HYPERCHOLESTEROLEMIA: ICD-10-CM

## 2020-07-01 DIAGNOSIS — I73.9 PERIPHERAL ARTERIAL DISEASE (HCC): ICD-10-CM

## 2020-07-01 DIAGNOSIS — I87.2 CHRONIC VENOUS INSUFFICIENCY: Primary | ICD-10-CM

## 2020-07-01 DIAGNOSIS — K40.30 INGUINAL HERNIA OF RIGHT SIDE WITH OBSTRUCTION: ICD-10-CM

## 2020-07-01 DIAGNOSIS — C61 PROSTATE CANCER (HCC): ICD-10-CM

## 2020-07-01 DIAGNOSIS — I10 BENIGN ESSENTIAL HYPERTENSION: ICD-10-CM

## 2020-07-01 PROCEDURE — 3008F BODY MASS INDEX DOCD: CPT | Performed by: SURGERY

## 2020-07-01 PROCEDURE — 3078F DIAST BP <80 MM HG: CPT | Performed by: SURGERY

## 2020-07-01 PROCEDURE — 4040F PNEUMOC VAC/ADMIN/RCVD: CPT | Performed by: SURGERY

## 2020-07-01 PROCEDURE — 1036F TOBACCO NON-USER: CPT | Performed by: SURGERY

## 2020-07-01 PROCEDURE — 1160F RVW MEDS BY RX/DR IN RCRD: CPT | Performed by: SURGERY

## 2020-07-01 PROCEDURE — 99213 OFFICE O/P EST LOW 20 MIN: CPT | Performed by: SURGERY

## 2020-07-01 PROCEDURE — 3074F SYST BP LT 130 MM HG: CPT | Performed by: SURGERY

## 2020-07-01 RX ORDER — CHLORHEXIDINE GLUCONATE 0.12 MG/ML
15 RINSE ORAL ONCE
Status: CANCELLED | OUTPATIENT
Start: 2020-07-01 | End: 2020-07-01

## 2020-07-01 NOTE — TELEPHONE ENCOUNTER
Spoke to patient to schedule his surgery for 7-14-20 [facility]/OR with Dr Mulligan No Patient was told nothing to eat or drink after midnight on 7-13-20 Patient was told to have blood work and Covid testing done  and EKG before the surgery I am mailing the patient sites for COVID testing  Patient confirmed and understood the instructions   F

## 2020-07-01 NOTE — ASSESSMENT & PLAN NOTE
Significant reflux noted in the right great saphenous vein accompanied by nonhealing recurrent wound  Patient has had 2 Apligraf supplied  He has been receiving compression therapy now for approximately 10 months  Recommended to the patient to undergo endovenous ablation of the right great saphenous vein  He wishes to consider further and discuss with his wife  His wife was present for this visit  Risks of the procedure were discussed with the patient and his accompanying wife which include infection and DVT  Patient has been revascularized from an arterial standpoint

## 2020-07-01 NOTE — LETTER
July 1, 2020     DO Herberth Perez [address]    Patient: Haile Ramirez   YOB: 1940   Date of Visit: 7/1/2020       Dear Dr Kate Baker: Thank you for referring Lizette Abarca to me for evaluation  Below are the relevant portions of my assessment and plan of care  Patient had a LEVDR on 6/15/20 which shows significant reflux within the right great saphenous vein  The vein measures 4 6 mm at the knee and 5 2 mm near the junction  There is no evidence of deep or  incompetence  Patient has undergone arterial optimization therefore I believe in the presence of persistent nonhealing wound appropriate therapy would include endovenous ablation of the incompetent right great saphenous vein to maximize wound healing  Pt has a right medial ankle wound for about 10 months  Pt states the wound has healed and reopened  Pt has Apligaph on the wound the 2nd application was placed last week  Pt states that he has shooting pain in the right leg  Pt is on  mg and Simvastatin  -right SFA angioplasty and stenting with concomitant tibioperoneal angioplasty performed in September of 2019  Last LEAD study performed December 17, 2019 shows significant improvement of the JANICE to 0 9 from 0 6 preprocedure  Assessment/Plan:    Chronic venous insufficiency  Significant reflux noted in the right great saphenous vein accompanied by nonhealing recurrent wound  Patient has had 2 Apligraf supplied  He has been receiving compression therapy now for approximately 10 months  Recommended to the patient to undergo endovenous ablation of the right great saphenous vein  He wishes to consider further and discuss with his wife  His wife was present for this visit  Risks of the procedure were discussed with the patient and his accompanying wife which include infection and DVT  Patient has been revascularized from an arterial standpoint    If you have questions, please do not hesitate to call me  I look forward to following Eddie Riley along with you           Sincerely,        Chris Martinez MD        CC: Joan Marie, DO

## 2020-07-01 NOTE — TELEPHONE ENCOUNTER
Check out staff:   REMINDER: Under Reason For Call, comments MUST be formatted as:   (Surgeon's Initials) / (Procedure)    PHYSICIAN / HOSPITAL: Mary Delgado (NPI: [id number]) Dia jesussavanah (Tax: [id number] / NPI: [id number])    PROCEDURE: R EVLT    LEVEL: 2    REP: No    ASSISTANT SURGEON: No    ALLERGIES: Other    INSTRUCTIONS GIVEN: NO BOWEL PREP    BLOOD THINNERS / MED HOLD: Patient is not taking any blood thinners  HYDRATION REQUIRED: Patient does not require hydration  DIALYSIS: Patient is not on dialysis  *Please send COMPLETE CONSENT to Vascular Surgery Schedulers email group*  I certify that patient has signed, printed, timed, and dated their surgery consent  I certify that BOTH sides of the completed surgery consent have been scanned into the patient's Epic chart by myself on 7/1/2020  *Please send CLEARANCE FORM(S) to Vascular Nursing email group*  Patient does not require any pre operative clearance

## 2020-07-01 NOTE — H&P (VIEW-ONLY)
Assessment/Plan:    Chronic venous insufficiency  Significant reflux noted in the right great saphenous vein accompanied by nonhealing recurrent wound  Patient has had 2 Apligraf supplied  He has been receiving compression therapy now for approximately 10 months  Recommended to the patient to undergo endovenous ablation of the right great saphenous vein  He wishes to consider further and discuss with his wife  His wife was present for this visit  Risks of the procedure were discussed with the patient and his accompanying wife which include infection and DVT  Patient has been revascularized from an arterial standpoint  Diagnoses and all orders for this visit:    Chronic venous insufficiency    Asymptomatic bilateral carotid artery stenosis    Benign essential hypertension    Peripheral arterial disease (HCC)    Prostate cancer ([facility] [address] )    Hypercholesterolemia    Inguinal hernia of right side with obstruction          Subjective:      Patient ID: Bogdan Pereira is a 79 y o  male  Patient had a LEVDR on 6/15/20 which shows significant reflux within the right great saphenous vein  The vein measures 4 6 mm at the knee and 5 2 mm near the junction  There is no evidence of deep or  incompetence  Patient has undergone arterial optimization therefore I believe in the presence of persistent nonhealing wound appropriate therapy would include endovenous ablation of the incompetent right great saphenous vein to maximize wound healing  Pt has a right medial ankle wound for about 10 months  Pt states the wound has healed and reopened  Pt has Apligaph on the wound the 2nd application was placed last week  Pt states that he has shooting pain in the right leg  Pt is on  mg and Simvastatin  -right SFA angioplasty and stenting with concomitant tibioperoneal angioplasty performed in September of 2019   Last LEAD study performed December 17, 2019 shows significant improvement of the JANICE to 0 9 from 0 6 preprocedure  The following portions of the patient's history were reviewed and updated as appropriate: allergies, current medications, past family history, past medical history, past social history, past surgical history and problem list     Review of Systems   Constitutional: Negative  HENT: Negative  Eyes: Negative  Respiratory: Negative  Cardiovascular: Negative for chest pain and leg swelling  Gastrointestinal: Negative  Endocrine: Negative  Genitourinary: Negative  Musculoskeletal:        Leg pain   Skin: Positive for color change and wound  Allergic/Immunologic: Negative  Neurological: Negative  Hematological: Negative  Psychiatric/Behavioral: Negative  Objective:      /68 (BP Location: Left arm, Patient Position: Sitting)   Pulse 68   Temp 97 8 °F (36 6 °C) (Tympanic)   Resp 16   Ht 5' 6" (1 676 m)   Wt 76 2 kg (168 lb)   BMI 27 12 kg/m²          Physical Exam   Constitutional: He is oriented to person, place, and time  He appears well-developed and well-nourished  HENT:   Head: Normocephalic and atraumatic  Mouth/Throat: Oropharynx is clear and moist    Eyes: Pupils are equal, round, and reactive to light  Conjunctivae and EOM are normal    Neck: Normal range of motion  Neck supple  No JVD present  Cardiovascular: Normal rate, regular rhythm and normal heart sounds  Pulmonary/Chest: Effort normal and breath sounds normal  No stridor  No respiratory distress  He has no wheezes  He has no rales  He exhibits no tenderness  Abdominal: Soft  He exhibits no distension and no mass  There is no tenderness  There is no rebound and no guarding  Musculoskeletal: Normal range of motion  He exhibits no tenderness or deformity  Neurological: He is alert and oriented to person, place, and time  Skin: Skin is warm and dry  No rash noted  No erythema  Psychiatric: He has a normal mood and affect   His behavior is normal  Thought content normal    Nursing note and vitals reviewed      Operative Scheduling Information:    Hospital:  [facility]    Physician:  Me    Surgery: RLE EVLT    Urgency:  Standard    Case Length:  Normal    Post-op Bed:  Outpatient    OR Table:  Standard    Equipment Needs:  Product/Implant: EVLT    Medication Instructions:  Aspirin:   Continue (do not hold)    Hydration:  No

## 2020-07-01 NOTE — PROGRESS NOTES
Assessment/Plan:    Chronic venous insufficiency  Significant reflux noted in the right great saphenous vein accompanied by nonhealing recurrent wound  Patient has had 2 Apligraf supplied  He has been receiving compression therapy now for approximately 10 months  Recommended to the patient to undergo endovenous ablation of the right great saphenous vein  He wishes to consider further and discuss with his wife  His wife was present for this visit  Risks of the procedure were discussed with the patient and his accompanying wife which include infection and DVT  Patient has been revascularized from an arterial standpoint  Diagnoses and all orders for this visit:    Chronic venous insufficiency    Asymptomatic bilateral carotid artery stenosis    Benign essential hypertension    Peripheral arterial disease (HCC)    Prostate cancer ([facility] [address] )    Hypercholesterolemia    Inguinal hernia of right side with obstruction          Subjective:      Patient ID: Freddy Flores is a 79 y o  male  Patient had a LEVDR on 6/15/20 which shows significant reflux within the right great saphenous vein  The vein measures 4 6 mm at the knee and 5 2 mm near the junction  There is no evidence of deep or  incompetence  Patient has undergone arterial optimization therefore I believe in the presence of persistent nonhealing wound appropriate therapy would include endovenous ablation of the incompetent right great saphenous vein to maximize wound healing  Pt has a right medial ankle wound for about 10 months  Pt states the wound has healed and reopened  Pt has Apligaph on the wound the 2nd application was placed last week  Pt states that he has shooting pain in the right leg  Pt is on  mg and Simvastatin  -right SFA angioplasty and stenting with concomitant tibioperoneal angioplasty performed in September of 2019   Last LEAD study performed December 17, 2019 shows significant improvement of the JANICE to 0 9 from 0 6 preprocedure  The following portions of the patient's history were reviewed and updated as appropriate: allergies, current medications, past family history, past medical history, past social history, past surgical history and problem list     Review of Systems   Constitutional: Negative  HENT: Negative  Eyes: Negative  Respiratory: Negative  Cardiovascular: Negative for chest pain and leg swelling  Gastrointestinal: Negative  Endocrine: Negative  Genitourinary: Negative  Musculoskeletal:        Leg pain   Skin: Positive for color change and wound  Allergic/Immunologic: Negative  Neurological: Negative  Hematological: Negative  Psychiatric/Behavioral: Negative  Objective:      /68 (BP Location: Left arm, Patient Position: Sitting)   Pulse 68   Temp 97 8 °F (36 6 °C) (Tympanic)   Resp 16   Ht 5' 6" (1 676 m)   Wt 76 2 kg (168 lb)   BMI 27 12 kg/m²          Physical Exam   Constitutional: He is oriented to person, place, and time  He appears well-developed and well-nourished  HENT:   Head: Normocephalic and atraumatic  Mouth/Throat: Oropharynx is clear and moist    Eyes: Pupils are equal, round, and reactive to light  Conjunctivae and EOM are normal    Neck: Normal range of motion  Neck supple  No JVD present  Cardiovascular: Normal rate, regular rhythm and normal heart sounds  Pulmonary/Chest: Effort normal and breath sounds normal  No stridor  No respiratory distress  He has no wheezes  He has no rales  He exhibits no tenderness  Abdominal: Soft  He exhibits no distension and no mass  There is no tenderness  There is no rebound and no guarding  Musculoskeletal: Normal range of motion  He exhibits no tenderness or deformity  Neurological: He is alert and oriented to person, place, and time  Skin: Skin is warm and dry  No rash noted  No erythema  Psychiatric: He has a normal mood and affect   His behavior is normal  Thought content normal    Nursing note and vitals reviewed      Operative Scheduling Information:    Hospital:  [facility]    Physician:  Me    Surgery: RLE EVLT    Urgency:  Standard    Case Length:  Normal    Post-op Bed:  Outpatient    OR Table:  Standard    Equipment Needs:  Product/Implant: EVLT    Medication Instructions:  Aspirin:   Continue (do not hold)    Hydration:  No

## 2020-07-02 ENCOUNTER — TELEMEDICINE (OUTPATIENT)
Dept: UROLOGY | Facility: CLINIC | Age: 80
End: 2020-07-02
Payer: COMMERCIAL

## 2020-07-02 DIAGNOSIS — C61 PROSTATE CANCER (HCC): Primary | ICD-10-CM

## 2020-07-02 DIAGNOSIS — E78.00 HYPERCHOLESTEREMIA: ICD-10-CM

## 2020-07-02 PROCEDURE — 99212 OFFICE O/P EST SF 10 MIN: CPT | Performed by: PHYSICIAN ASSISTANT

## 2020-07-02 NOTE — PROGRESS NOTES
Virtual Brief Visit    Assessment:    1  Prostate cancer    Plan:  -follow-up in 1 year with PSA prior to visit    Problem List Items Addressed This Visit        Genitourinary    Prostate cancer [facility]) - Primary    Relevant Orders    PSA Total, Diagnostic            It was my intent to perform this visit via video technology but the patient was not able to do a video connection so the visit was completed via audio telephone only  Reason for visit is:  Prostate cancer follow-up   Chief Complaint   Patient presents with    Virtual Brief Visit        Encounter provider Fran Dandy, PA-C    Provider located at [address] [place] [phone]    Recent Visits  No visits were found meeting these conditions  Showing recent visits within past 7 days and meeting all other requirements     Today's Visits  Date Type Provider Dept   07/02/20 Telemedicine Fran Dandy, PA-C [facility] today's visits and meeting all other requirements     Future Appointments  No visits were found meeting these conditions  Showing future appointments within next 150 days and meeting all other requirements        After connecting through telephone, the patient was identified by name and date of birth  Reyna Mata was informed that this is a telemedicine visit and that the visit is being conducted through telephone  My office door was closed  No one else was in the room  He acknowledged consent and understanding of privacy and security of the platform  The patient has agreed to participate and understands he can discontinue the visit at any time  Patient is aware this is a billable service  Subjective    Reyna Mata is a 79 y o  male with history of Christ 7 stage II prostate cancer  He had a robotic prostatectomy in 2014  His PSA is< 0 1  He has no complaints and good urinary control  Alex Pardo   [facility]     Past Medical History:   Diagnosis Date    Basal cell carcinoma, ear, unspecified laterality     LAST ASSESSED: [id number]    Hyperlipidemia     Hypertension     Inguinal hernia     RESOLVED: [id number]    Malignant neoplasm of prostate ([address] )     RESOLVED: 27OCT2015    Prostate enlargement     RESOLVED: 27OCT2015    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, left     LAST ASSESSED: 27OCT2015       Past Surgical History:   Procedure Laterality Date    IR ABDOMINAL ANGIOGRAPHY / INTERVENTION  9/20/2019    PA REPAIR ING HERNIA,5+Y/O,REDUCIBL Right 12/7/2016    Procedure: REPAIR HERNIA INGUINAL; HYDROCELECTOMY;  Surgeon: Brigitte Sanders MD;  [facility];  Service: General    PROSTATE SURGERY  2014    PROSTATECTOMY RADICAL; LAST ASSESSED: [id number]    SKIN CANCER EXCISION      TRANSURETHRAL RESECTION OF PROSTATE  2011       Current Outpatient Medications   Medication Sig Dispense Refill    aspirin (ECOTRIN) 325 mg EC tablet Take 650 mg by mouth every 6 (six) hours as needed for mild pain      atenolol (TENORMIN) 25 mg tablet Take 0 5 tablets (12 5 mg total) by mouth daily 45 tablet 0    bimatoprost (LUMIGAN) 0 01 % ophthalmic drops Administer 1 drop to both eyes daily at bedtime      brinzolamide (AZOPT) 1 % ophthalmic suspension Administer 1 drop to both eyes 2 (two) times a day      irbesartan (AVAPRO) 150 mg tablet Take 1 tablet (150 mg total) by mouth daily at bedtime 90 tablet 0    multivitamin (THERAGRAN) TABS Take 1 tablet by mouth daily      polymyxin b-trimethoprim (POLYTRIM) ophthalmic solution INSTILL 1 DROP INTO SURGERY EYE 4 TIMES A DAY, START 3 DAYS PRIOR     (REFER TO PRESCRIPTION NOTES)  0    prednisoLONE acetate (PRED FORTE) 1 % ophthalmic suspension INSTILL 1 DROP INTO SURGERY EYE 4 TIMES A DAY, START 3 DAYS PRIOR     (REFER TO PRESCRIPTION NOTES)    0    SANTYL ointment APPLY A NICKEL THICK LAYER TO WOUND DAILY - OR MORE FREQUENTLY IF WOUND BECOMES SOILED  0    simvastatin (ZOCOR) 20 mg tablet Take 1 tablet (20 mg total) by mouth daily at bedtime 90 tablet 1    traMADol (ULTRAM) 50 mg tablet Take 1 tablet (50 mg total) by mouth every 8 (eight) hours as needed for moderate pain (Patient not taking: Reported on 4/14/2020) 30 tablet 1     No current facility-administered medications for this visit  Allergies   Allergen Reactions    Other      SESAME SEEDS         Review of Systems Genito-Urinary ROS: no dysuria, trouble voiding, or hematuria  There were no vitals filed for this visit  As a result of this visit, I have not referred the patient for further respiratory evaluation  I spent 15 minutes with patient today in which greater than 50% of the time was spent in counseling/coordination of care regarding Prostate cancer    [address] acknowledges that he has consented to an online visit or consultation  He understands that the online visit is based solely on information provided by him, and that, in the absence of a face-to-face physical evaluation by the physician, the diagnosis he receives is both limited and provisional in terms of accuracy and completeness  This is not intended to replace a full medical face-to-face evaluation by the physician  Denny Padilla understands and accepts these terms

## 2020-07-07 ENCOUNTER — APPOINTMENT (OUTPATIENT)
Dept: WOUND CARE | Facility: HOSPITAL | Age: 80
End: 2020-07-07
Payer: COMMERCIAL

## 2020-07-07 PROCEDURE — 97597 DBRDMT OPN WND 1ST 20 CM/<: CPT

## 2020-07-07 RX ORDER — ATENOLOL 25 MG/1
12.5 TABLET ORAL DAILY
COMMUNITY
End: 2020-07-08 | Stop reason: SDUPTHER

## 2020-07-07 RX ORDER — SIMVASTATIN 20 MG
20 TABLET ORAL
COMMUNITY
End: 2020-07-08 | Stop reason: SDUPTHER

## 2020-07-07 NOTE — PRE-PROCEDURE INSTRUCTIONS
Pre-Surgery Instructions:   Medication Instructions    aspirin (ECOTRIN) 325 mg EC tablet Patient was instructed by Physician and understands   atenolol (TENORMIN) 25 mg tablet Instructed patient per Anesthesia Guidelines   bimatoprost (LUMIGAN) 0 01 % ophthalmic drops Instructed patient per Anesthesia Guidelines   brinzolamide (AZOPT) 1 % ophthalmic suspension Instructed patient per Anesthesia Guidelines   irbesartan (AVAPRO) 150 mg tablet Instructed patient per Anesthesia Guidelines   multivitamin (THERAGRAN) TABS Instructed patient per Anesthesia Guidelines   simvastatin (ZOCOR) 20 mg tablet Instructed patient per Anesthesia Guidelines          Spoke to pt  Medication list reviewed & instructed   As of 7/7 pt to stop MV  Instructed on tylenol only   Per surgeon HP 7/1: continue aspirin  Am DOS pt ok to take atenolol with small sip of water   Showering instructions provided by surgeon office, reviewed @ time of call   Negative COVID screening   All instructions verbally understood by patient  No further questions

## 2020-07-08 ENCOUNTER — OFFICE VISIT (OUTPATIENT)
Dept: FAMILY MEDICINE CLINIC | Facility: CLINIC | Age: 80
End: 2020-07-08
Payer: COMMERCIAL

## 2020-07-08 ENCOUNTER — APPOINTMENT (OUTPATIENT)
Dept: LAB | Age: 80
End: 2020-07-08
Payer: COMMERCIAL

## 2020-07-08 ENCOUNTER — OFFICE VISIT (OUTPATIENT)
Dept: LAB | Age: 80
End: 2020-07-08
Payer: COMMERCIAL

## 2020-07-08 VITALS
BODY MASS INDEX: 28.12 KG/M2 | DIASTOLIC BLOOD PRESSURE: 72 MMHG | HEART RATE: 60 BPM | SYSTOLIC BLOOD PRESSURE: 134 MMHG | OXYGEN SATURATION: 98 % | HEIGHT: 65 IN | WEIGHT: 168.8 LBS | TEMPERATURE: 97.6 F

## 2020-07-08 DIAGNOSIS — I87.2 CHRONIC VENOUS INSUFFICIENCY: ICD-10-CM

## 2020-07-08 DIAGNOSIS — I73.9 PERIPHERAL ARTERIAL DISEASE (HCC): ICD-10-CM

## 2020-07-08 DIAGNOSIS — I10 ESSENTIAL HYPERTENSION: ICD-10-CM

## 2020-07-08 DIAGNOSIS — E78.00 HYPERCHOLESTEREMIA: ICD-10-CM

## 2020-07-08 DIAGNOSIS — C61 PROSTATE CANCER (HCC): ICD-10-CM

## 2020-07-08 DIAGNOSIS — E78.00 HYPERCHOLESTEREMIA: Primary | ICD-10-CM

## 2020-07-08 LAB
ALBUMIN SERPL BCP-MCNC: 3.8 G/DL (ref 3.5–5)
ALP SERPL-CCNC: 79 U/L (ref 46–116)
ALT SERPL W P-5'-P-CCNC: 23 U/L (ref 12–78)
ANION GAP SERPL CALCULATED.3IONS-SCNC: 5 MMOL/L (ref 4–13)
APTT PPP: 30 SECONDS (ref 23–37)
AST SERPL W P-5'-P-CCNC: 19 U/L (ref 5–45)
BILIRUB DIRECT SERPL-MCNC: 0.13 MG/DL (ref 0–0.2)
BILIRUB SERPL-MCNC: 0.47 MG/DL (ref 0.2–1)
BUN SERPL-MCNC: 29 MG/DL (ref 5–25)
CALCIUM SERPL-MCNC: 9.1 MG/DL (ref 8.3–10.1)
CHLORIDE SERPL-SCNC: 109 MMOL/L (ref 100–108)
CHOLEST SERPL-MCNC: 152 MG/DL (ref 50–200)
CO2 SERPL-SCNC: 26 MMOL/L (ref 21–32)
CREAT SERPL-MCNC: 1.06 MG/DL (ref 0.6–1.3)
ERYTHROCYTE [DISTWIDTH] IN BLOOD BY AUTOMATED COUNT: 14.2 % (ref 11.6–15.1)
GFR SERPL CREATININE-BSD FRML MDRD: 65 ML/MIN/1.73SQ M
GLUCOSE P FAST SERPL-MCNC: 95 MG/DL (ref 65–99)
HCT VFR BLD AUTO: 47.8 % (ref 36.5–49.3)
HDLC SERPL-MCNC: 41 MG/DL
HGB BLD-MCNC: 15.2 G/DL (ref 12–17)
INR PPP: 1.03 (ref 0.84–1.19)
LDLC SERPL CALC-MCNC: 77 MG/DL (ref 0–100)
MCH RBC QN AUTO: 29.9 PG (ref 26.8–34.3)
MCHC RBC AUTO-ENTMCNC: 31.8 G/DL (ref 31.4–37.4)
MCV RBC AUTO: 94 FL (ref 82–98)
NONHDLC SERPL-MCNC: 111 MG/DL
PLATELET # BLD AUTO: 163 THOUSANDS/UL (ref 149–390)
PMV BLD AUTO: 10.7 FL (ref 8.9–12.7)
POTASSIUM SERPL-SCNC: 4.8 MMOL/L (ref 3.5–5.3)
PROT SERPL-MCNC: 7.4 G/DL (ref 6.4–8.2)
PROTHROMBIN TIME: 13.5 SECONDS (ref 11.6–14.5)
RBC # BLD AUTO: 5.08 MILLION/UL (ref 3.88–5.62)
SODIUM SERPL-SCNC: 140 MMOL/L (ref 136–145)
TRIGL SERPL-MCNC: 168 MG/DL
WBC # BLD AUTO: 12.24 THOUSAND/UL (ref 4.31–10.16)

## 2020-07-08 PROCEDURE — 3008F BODY MASS INDEX DOCD: CPT | Performed by: FAMILY MEDICINE

## 2020-07-08 PROCEDURE — 3078F DIAST BP <80 MM HG: CPT | Performed by: FAMILY MEDICINE

## 2020-07-08 PROCEDURE — 85027 COMPLETE CBC AUTOMATED: CPT

## 2020-07-08 PROCEDURE — U0003 INFECTIOUS AGENT DETECTION BY NUCLEIC ACID (DNA OR RNA); SEVERE ACUTE RESPIRATORY SYNDROME CORONAVIRUS 2 (SARS-COV-2) (CORONAVIRUS DISEASE [COVID-19]), AMPLIFIED PROBE TECHNIQUE, MAKING USE OF HIGH THROUGHPUT TECHNOLOGIES AS DESCRIBED BY CMS-2020-01-R: HCPCS

## 2020-07-08 PROCEDURE — 36415 COLL VENOUS BLD VENIPUNCTURE: CPT

## 2020-07-08 PROCEDURE — 3075F SYST BP GE 130 - 139MM HG: CPT | Performed by: FAMILY MEDICINE

## 2020-07-08 PROCEDURE — 80061 LIPID PANEL: CPT

## 2020-07-08 PROCEDURE — 99215 OFFICE O/P EST HI 40 MIN: CPT | Performed by: FAMILY MEDICINE

## 2020-07-08 PROCEDURE — 80048 BASIC METABOLIC PNL TOTAL CA: CPT

## 2020-07-08 PROCEDURE — 80076 HEPATIC FUNCTION PANEL: CPT

## 2020-07-08 PROCEDURE — 85610 PROTHROMBIN TIME: CPT

## 2020-07-08 PROCEDURE — 85730 THROMBOPLASTIN TIME PARTIAL: CPT

## 2020-07-08 PROCEDURE — 1036F TOBACCO NON-USER: CPT | Performed by: FAMILY MEDICINE

## 2020-07-08 PROCEDURE — 93005 ELECTROCARDIOGRAM TRACING: CPT

## 2020-07-08 PROCEDURE — 4040F PNEUMOC VAC/ADMIN/RCVD: CPT | Performed by: FAMILY MEDICINE

## 2020-07-08 PROCEDURE — 1160F RVW MEDS BY RX/DR IN RCRD: CPT | Performed by: FAMILY MEDICINE

## 2020-07-08 RX ORDER — SIMVASTATIN 20 MG
20 TABLET ORAL
Qty: 90 TABLET | Refills: 1 | Status: SHIPPED | OUTPATIENT
Start: 2020-07-08 | End: 2020-10-21 | Stop reason: SDUPTHER

## 2020-07-08 RX ORDER — ATENOLOL 25 MG/1
12.5 TABLET ORAL DAILY
Qty: 45 TABLET | Refills: 1 | Status: SHIPPED | OUTPATIENT
Start: 2020-07-08 | End: 2020-12-08 | Stop reason: SDUPTHER

## 2020-07-08 RX ORDER — IRBESARTAN 150 MG/1
150 TABLET ORAL
Qty: 90 TABLET | Refills: 1 | Status: SHIPPED | OUTPATIENT
Start: 2020-07-08 | End: 2020-12-08 | Stop reason: SDUPTHER

## 2020-07-08 RX ORDER — SIMVASTATIN 20 MG
TABLET ORAL
Qty: 90 TABLET | Refills: 3 | Status: SHIPPED | OUTPATIENT
Start: 2020-07-08 | End: 2021-01-29 | Stop reason: SDUPTHER

## 2020-07-08 NOTE — PROGRESS NOTES
Chief Complaint   Patient presents with    Follow-up    Hypertension     Assessment/Plan:  Discussed precautions with covid  Refill Med's  Keep active, water for hydration  BMI Counseling: Body mass index is 28 09 kg/m²  The BMI is above normal  Nutrition recommendations include consuming healthier snacks, moderation in carbohydrate intake and increasing intake of lean protein  Diagnoses and all orders for this visit:    Hypercholesteremia  -     simvastatin (ZOCOR) 20 mg tablet; Take 1 tablet (20 mg total) by mouth daily at bedtime  -     Lipid panel; Future    Essential hypertension  -     irbesartan (AVAPRO) 150 mg tablet; Take 1 tablet (150 mg total) by mouth daily at bedtime  -     atenolol (TENORMIN) 25 mg tablet; Take 0 5 tablets (12 5 mg total) by mouth daily  -     Hepatic function panel; Future          Subjective:      Patient ID: Ally Elizabeth is a 79 y o  male  Refill  Follows with urology for prostate CA  No problem starting or stopping urine  Scheduled for vascular surgery, vein  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  BP controlled  Hx of PVD, and venous insuff  The following portions of the patient's history were reviewed and updated as appropriate: allergies, current medications, past medical history, past social history, past surgical history and problem list   I have spent 40 minutes with Patient and family today in which greater than 50% of this time was spent in counseling/coordination of care regarding Diagnostic results, Prognosis, Risks and benefits of tx options, Intructions for management, Patient and family education, Importance of tx compliance, Risk factor reductions and Impressions  Review of Systems   Constitutional: Negative  HENT: Negative  Eyes: Negative  Respiratory: Negative  Cardiovascular: Negative  Gastrointestinal: Negative  Genitourinary: Negative  Musculoskeletal: Negative  Skin:        Warts on hands    Non healing wound on RLE, vein insuff  Neurological: Negative  Psychiatric/Behavioral: Negative  Objective:      /72 (BP Location: Left arm, Patient Position: Sitting, Cuff Size: Standard)   Pulse 60   Temp 97 6 °F (36 4 °C) (Tympanic)   Ht 5' 5" (1 651 m)   Wt 76 6 kg (168 lb 12 8 oz)   SpO2 98%   BMI 28 09 kg/m²       Current Outpatient Medications:     aspirin (ECOTRIN) 325 mg EC tablet, Take 325 mg by mouth daily, Disp: , Rfl:     atenolol (TENORMIN) 25 mg tablet, Take 12 5 mg by mouth daily, Disp: , Rfl:     bimatoprost (LUMIGAN) 0 01 % ophthalmic drops, Administer 1 drop to both eyes daily at bedtime, Disp: , Rfl:     brinzolamide (AZOPT) 1 % ophthalmic suspension, Administer 1 drop to both eyes 2 (two) times a day, Disp: , Rfl:     irbesartan (AVAPRO) 150 mg tablet, Take 1 tablet (150 mg total) by mouth daily at bedtime, Disp: 90 tablet, Rfl: 0    multivitamin (THERAGRAN) TABS, Take 1 tablet by mouth daily, Disp: , Rfl:     simvastatin (ZOCOR) 20 mg tablet, Take 20 mg by mouth daily at bedtime, Disp: , Rfl:     Allergies   Allergen Reactions    Other      SESAME SEEDS         Past Surgical History:   Procedure Laterality Date    IR ABDOMINAL ANGIOGRAPHY / INTERVENTION  9/20/2019    VT REPAIR ING HERNIA,5+Y/O,REDUCIBL Right 12/7/2016    Procedure: REPAIR HERNIA INGUINAL; HYDROCELECTOMY;  Surgeon: Christina Trinidad MD;  [facility];  Service: General    PROSTATE SURGERY  2014    PROSTATECTOMY RADICAL; LAST ASSESSED: 27OCT2015    SKIN CANCER EXCISION      TRANSURETHRAL RESECTION OF PROSTATE  2011          Physical Exam   Constitutional: He is oriented to person, place, and time  He appears well-developed and well-nourished  HENT:   Head: Normocephalic and atraumatic  Right Ear: External ear normal    Left Ear: External ear normal    Nose: Nose normal    Mouth/Throat: Oropharynx is clear and moist    Eyes: Pupils are equal, round, and reactive to light   Conjunctivae and EOM are normal    Neck: Normal range of motion  Neck supple  Cardiovascular: Normal rate, regular rhythm and normal heart sounds  Pulmonary/Chest: Effort normal and breath sounds normal    Abdominal: Soft  Bowel sounds are normal    Neurological: He is alert and oriented to person, place, and time  He has normal reflexes  Skin: Skin is warm and dry  Psychiatric: He has a normal mood and affect   His behavior is normal  Judgment and thought content normal

## 2020-07-09 LAB
ATRIAL RATE: 57 BPM
P AXIS: 90 DEGREES
PR INTERVAL: 146 MS
QRS AXIS: 70 DEGREES
QRSD INTERVAL: 80 MS
QT INTERVAL: 436 MS
QTC INTERVAL: 424 MS
T WAVE AXIS: 64 DEGREES
VENTRICULAR RATE: 57 BPM

## 2020-07-09 PROCEDURE — 93010 ELECTROCARDIOGRAM REPORT: CPT | Performed by: INTERNAL MEDICINE

## 2020-07-13 ENCOUNTER — ANESTHESIA EVENT (OUTPATIENT)
Dept: PERIOP | Facility: HOSPITAL | Age: 80
End: 2020-07-13
Payer: COMMERCIAL

## 2020-07-14 ENCOUNTER — APPOINTMENT (OUTPATIENT)
Dept: NON INVASIVE DIAGNOSTICS | Facility: HOSPITAL | Age: 80
End: 2020-07-14
Payer: COMMERCIAL

## 2020-07-14 ENCOUNTER — HOSPITAL ENCOUNTER (OUTPATIENT)
Facility: HOSPITAL | Age: 80
Setting detail: OUTPATIENT SURGERY
Discharge: HOME | End: 2020-07-14
Attending: SURGERY | Admitting: SURGERY
Payer: COMMERCIAL

## 2020-07-14 ENCOUNTER — ANESTHESIA (OUTPATIENT)
Dept: PERIOP | Facility: HOSPITAL | Age: 80
End: 2020-07-14
Payer: COMMERCIAL

## 2020-07-14 VITALS
OXYGEN SATURATION: 98 % | HEIGHT: 65 IN | SYSTOLIC BLOOD PRESSURE: 149 MMHG | DIASTOLIC BLOOD PRESSURE: 71 MMHG | TEMPERATURE: 96.1 F | RESPIRATION RATE: 16 BRPM | WEIGHT: 168 LBS | HEART RATE: 75 BPM | BODY MASS INDEX: 27.99 KG/M2

## 2020-07-14 DIAGNOSIS — I83.891 VARICOSE VEINS OF RIGHT LOWER EXTREMITY WITH OTHER COMPLICATIONS: ICD-10-CM

## 2020-07-14 LAB
HBV SURFACE AG SER QL: NORMAL
HCV AB SER QL: NORMAL
HIV 1+2 AB+HIV1 P24 AG SERPL QL IA: NORMAL
HIV1 P24 AG SER QL: NORMAL
SARS-COV-2 RNA RESP QL NAA+PROBE: NEGATIVE

## 2020-07-14 PROCEDURE — 87806 HIV AG W/HIV1&2 ANTB W/OPTIC: CPT | Performed by: SURGERY

## 2020-07-14 PROCEDURE — 86803 HEPATITIS C AB TEST: CPT | Performed by: SURGERY

## 2020-07-14 PROCEDURE — U0003 INFECTIOUS AGENT DETECTION BY NUCLEIC ACID (DNA OR RNA); SEVERE ACUTE RESPIRATORY SYNDROME CORONAVIRUS 2 (SARS-COV-2) (CORONAVIRUS DISEASE [COVID-19]), AMPLIFIED PROBE TECHNIQUE, MAKING USE OF HIGH THROUGHPUT TECHNOLOGIES AS DESCRIBED BY CMS-2020-01-R: HCPCS | Performed by: SURGERY

## 2020-07-14 PROCEDURE — 36478 ENDOVENOUS LASER 1ST VEIN: CPT | Performed by: SURGERY

## 2020-07-14 PROCEDURE — 93971 EXTREMITY STUDY: CPT

## 2020-07-14 PROCEDURE — 87340 HEPATITIS B SURFACE AG IA: CPT | Performed by: SURGERY

## 2020-07-14 PROCEDURE — NC001 PR NO CHARGE: Performed by: SURGERY

## 2020-07-14 RX ORDER — LIDOCAINE HYDROCHLORIDE 10 MG/ML
0.5 INJECTION, SOLUTION EPIDURAL; INFILTRATION; INTRACAUDAL; PERINEURAL ONCE AS NEEDED
Status: DISCONTINUED | OUTPATIENT
Start: 2020-07-14 | End: 2020-07-14 | Stop reason: HOSPADM

## 2020-07-14 RX ORDER — LIDOCAINE HYDROCHLORIDE 10 MG/ML
INJECTION, SOLUTION EPIDURAL; INFILTRATION; INTRACAUDAL; PERINEURAL AS NEEDED
Status: DISCONTINUED | OUTPATIENT
Start: 2020-07-14 | End: 2020-07-14 | Stop reason: SURG

## 2020-07-14 RX ORDER — ONDANSETRON 2 MG/ML
INJECTION INTRAMUSCULAR; INTRAVENOUS AS NEEDED
Status: DISCONTINUED | OUTPATIENT
Start: 2020-07-14 | End: 2020-07-14 | Stop reason: SURG

## 2020-07-14 RX ORDER — FENTANYL CITRATE 50 UG/ML
INJECTION, SOLUTION INTRAMUSCULAR; INTRAVENOUS AS NEEDED
Status: DISCONTINUED | OUTPATIENT
Start: 2020-07-14 | End: 2020-07-14 | Stop reason: SURG

## 2020-07-14 RX ORDER — EPHEDRINE SULFATE 50 MG/ML
INJECTION INTRAVENOUS AS NEEDED
Status: DISCONTINUED | OUTPATIENT
Start: 2020-07-14 | End: 2020-07-14 | Stop reason: SURG

## 2020-07-14 RX ORDER — SODIUM CHLORIDE, SODIUM LACTATE, POTASSIUM CHLORIDE, CALCIUM CHLORIDE 600; 310; 30; 20 MG/100ML; MG/100ML; MG/100ML; MG/100ML
125 INJECTION, SOLUTION INTRAVENOUS CONTINUOUS
Status: DISCONTINUED | OUTPATIENT
Start: 2020-07-14 | End: 2020-07-14 | Stop reason: HOSPADM

## 2020-07-14 RX ORDER — CHLORHEXIDINE GLUCONATE 0.12 MG/ML
15 RINSE ORAL ONCE
Status: DISCONTINUED | OUTPATIENT
Start: 2020-07-14 | End: 2020-07-14

## 2020-07-14 RX ORDER — DEXAMETHASONE SODIUM PHOSPHATE 10 MG/ML
INJECTION, SOLUTION INTRAMUSCULAR; INTRAVENOUS AS NEEDED
Status: DISCONTINUED | OUTPATIENT
Start: 2020-07-14 | End: 2020-07-14 | Stop reason: SURG

## 2020-07-14 RX ORDER — FENTANYL CITRATE/PF 50 MCG/ML
25 SYRINGE (ML) INJECTION
Status: DISCONTINUED | OUTPATIENT
Start: 2020-07-14 | End: 2020-07-14 | Stop reason: HOSPADM

## 2020-07-14 RX ORDER — CEFAZOLIN SODIUM 2 G/50ML
2000 SOLUTION INTRAVENOUS ONCE
Status: COMPLETED | OUTPATIENT
Start: 2020-07-14 | End: 2020-07-14

## 2020-07-14 RX ORDER — MEPERIDINE HYDROCHLORIDE 25 MG/ML
25 INJECTION INTRAMUSCULAR; INTRAVENOUS; SUBCUTANEOUS ONCE AS NEEDED
Status: DISCONTINUED | OUTPATIENT
Start: 2020-07-14 | End: 2020-07-14 | Stop reason: HOSPADM

## 2020-07-14 RX ORDER — PROPOFOL 10 MG/ML
INJECTION, EMULSION INTRAVENOUS AS NEEDED
Status: DISCONTINUED | OUTPATIENT
Start: 2020-07-14 | End: 2020-07-14 | Stop reason: SURG

## 2020-07-14 RX ADMIN — SODIUM CHLORIDE, SODIUM LACTATE, POTASSIUM CHLORIDE, AND CALCIUM CHLORIDE: .6; .31; .03; .02 INJECTION, SOLUTION INTRAVENOUS at 07:14

## 2020-07-14 RX ADMIN — EPHEDRINE SULFATE 10 MG: 50 INJECTION, SOLUTION INTRAVENOUS at 08:27

## 2020-07-14 RX ADMIN — EPHEDRINE SULFATE 10 MG: 50 INJECTION, SOLUTION INTRAVENOUS at 08:18

## 2020-07-14 RX ADMIN — PHENYLEPHRINE HYDROCHLORIDE 200 MCG: 10 INJECTION INTRAVENOUS at 08:41

## 2020-07-14 RX ADMIN — LIDOCAINE HYDROCHLORIDE 50 MG: 10 INJECTION, SOLUTION EPIDURAL; INFILTRATION; INTRACAUDAL; PERINEURAL at 08:02

## 2020-07-14 RX ADMIN — ONDANSETRON 4 MG: 2 INJECTION INTRAMUSCULAR; INTRAVENOUS at 08:03

## 2020-07-14 RX ADMIN — CEFAZOLIN SODIUM 2000 MG: 2 SOLUTION INTRAVENOUS at 07:56

## 2020-07-14 RX ADMIN — FENTANYL CITRATE 50 MCG: 50 INJECTION, SOLUTION INTRAMUSCULAR; INTRAVENOUS at 08:10

## 2020-07-14 RX ADMIN — PROPOFOL 150 MG: 10 INJECTION, EMULSION INTRAVENOUS at 08:02

## 2020-07-14 RX ADMIN — PHENYLEPHRINE HYDROCHLORIDE 50 MCG/MIN: 10 INJECTION INTRAVENOUS at 08:42

## 2020-07-14 RX ADMIN — DEXAMETHASONE SODIUM PHOSPHATE 4 MG: 10 INJECTION, SOLUTION INTRAMUSCULAR; INTRAVENOUS at 08:03

## 2020-07-14 NOTE — DISCHARGE INSTRUCTIONS
Endovenous Ablation   WHAT YOU NEED TO KNOW:   Endovenous ablation is a procedure that uses radiofrequency or laser energy to treat varicose veins  Varicose veins are large, twisted veins in your legs that bulge out under your skin  Endovenous ablation may help treat pain, discolored skin, or ulcers in your leg that are caused by varicose veins  DISCHARGE INSTRUCTIONS:   Call 911 for any of the following:   · You feel lightheaded, short of breath, and have chest pain  · You cough up blood  · You have trouble breathing  Seek care immediately if:   · Blood soaks through your bandage  · Your leg feels warm, tender and painful  · Any part of your leg is numb or pale to the touch  Contact your healthcare provider if:   · You have a fever or chills  · Your wound is red, swollen, or draining pus  · You have nausea or vomiting  · Your skin is itchy, swollen, or you have a rash  · You have questions or concerns about your condition or care  Medicines: You may need any of the following:  · Prescription pain medicine  may be given  Ask your healthcare provider how to take this medicine safely  · NSAIDs , such as ibuprofen, help decrease swelling, pain, and fever  NSAIDs can cause stomach bleeding or kidney problems in certain people  If you take blood thinner medicine, always ask your healthcare provider if NSAIDs are safe for you  Always read the medicine label and follow directions  · Take your medicine as directed  Contact your healthcare provider if you think your medicine is not helping or if you have side effects  Tell him or her if you are allergic to any medicine  Keep a list of the medicines, vitamins, and herbs you take  Include the amounts, and when and why you take them  Bring the list or the pill bottles to follow-up visits  Carry your medicine list with you in case of an emergency    Care for your wound as directed: remove dressing in 72 hours Carefully wash the wound with soap and water  Check your incision for signs of infection such as redness, swelling, or pus  Self-care:   Stay active  Do not spend too much time sitting or standing  Take short walks throughout the day  Walking will improve blood flow and prevent blood clots in your leg  Ask your healthcare provider how much activity you need to do every day  · Elevate your leg  above the level of your heart when you are not moving  This will help decrease swelling and pain  Prop your leg on pillows or blankets to keep it elevated comfortably  Follow up with your healthcare provider as directed:  Write down your questions so you remember to ask them during your visits  © 2017 [employer] Information is for End User's use only and may not be sold, redistributed or otherwise used for commercial purposes  All illustrations and images included in CareNotes® are the copyrighted property of [employer]  or Ammon Zhu  The above information is an  only  It is not intended as medical advice for individual conditions or treatments  Talk to your doctor, nurse or pharmacist before following any medical regimen to see if it is safe and effective for you

## 2020-07-14 NOTE — ANESTHESIA PREPROCEDURE EVALUATION
Review of Systems/Medical History  Patient summary reviewed  Chart reviewed  No history of anesthetic complications     Cardiovascular  EKG reviewed, Hyperlipidemia, Hypertension ,   Comment: Carotid stenosis, venous insuf,  Pulmonary  Negative pulmonary ROS        GI/Hepatic       Prostatic disorder, history of prostate cancer       Endo/Other     GYN       Hematology  Negative hematology ROS      Musculoskeletal  Negative musculoskeletal ROS        Neurology  Negative neurology ROS      Psychology   Negative psychology ROS              Physical Exam    Airway    Mallampati score: I  TM Distance: >3 FB  Neck ROM: full     Dental   No notable dental hx     Cardiovascular      Pulmonary      Other Findings        Anesthesia Plan  ASA Score- 2     Anesthesia Type- general with ASA Monitors  Additional Monitors:   Airway Plan: LMA  Plan Factors-  Patient did not smoke on day of surgery  Induction- intravenous  Postoperative Plan-     Informed Consent- Anesthetic plan and risks discussed with patient  I personally reviewed this patient with the CRNA  Discussed and agreed on the Anesthesia Plan with the CRNA  Sandeep Tamayo

## 2020-07-14 NOTE — OP NOTE
OPERATIVE REPORT  PATIENT NAME: Valdemar Wilde    :  1940  MRN: [id number]  Pt Location: [facility] OR ROOM 07    SURGERY DATE: 2020    Surgeon(s) and Role:     * Natalia Black MD - Primary     * Edelmira Deluna DO - Assisting    Preop Diagnosis:  Chronic venous insufficiency [I87 2]    Post-Op Diagnosis Codes:     * Chronic venous insufficiency [I87 2]    Procedure(s) (LRB):  ENDOVASCULAR LASER THERAPY (EVLT) (Right)    Specimen(s):  * No specimens in log *    Estimated Blood Loss:   Minimal    Drains:  * No LDAs found *    Anesthesia Type:   General/LMA    Operative Indications:  Chronic venous insufficiency [I87 2] with venous stasis wounds    Operative Findings:  No DVT at case end  Successful thrombosis of GSV    Complications:   None    Procedure and Technique:  After informed consent was obtained, the patient was brought to the operating room and placed in the supine position  He was given anesthesia and an LMA was placed  He was given IV antibiotics  The patient was then prepped and draped in the usual sterile fashion, exposing the right leg circumferentially  The duplex ultrasound was brought to the field and the greater saphenous vein was mapped from the groin to the knee  The vein was then accessed with the micropuncture set and the guidewire exchanged for the  035 J-wire which passed easily into the femoral vein  The laser sheath was inserted over the guidewire and the laser fiber was then positioned 3 cm from the saphenofemoral junction  Tumescent anesthesia was then instilled under ultrasound guidance along the entire length of the greater saphenous vein  The laser was then activated at 13 mcneil and withdrawn in one continuous pullback and pressure was held at the access site  Following this, insonation of the saphenofemoral junction confirmed patency and flow through the junction and no evidence of deep vein thrombosis    The greater saphenous vein showed wall thickening with little to no flow indicating successful ablation  Hemostasis was achieved with compression at the puncture site  The access site was dressed with steri-strips and Tegaderm and the leg was wrapped with gauze, kerlix, and ACE wrap from toes to thigh  The patient was allowed to awaken and was extubated  He was transferred to the PACU for postoperative care     I was present for the entire procedure    Patient Disposition:  PACU     SIGNATURE: Waqar Looney MD  DATE: July 14, 2020  TIME: 5:55 PM

## 2020-07-14 NOTE — ANESTHESIA POSTPROCEDURE EVALUATION
Post-Op Assessment Note    CV Status:  Stable    Pain management: satisfactory to patient     Mental Status:  Alert and awake   Hydration Status:  Euvolemic   PONV Controlled:  Controlled   Airway Patency:  Patent   Post Op Vitals Reviewed: Yes      Staff: CRNA, Anesthesiologist           /65 (07/14/20 0908)    Temp 97 9 °F (36 6 °C) (07/14/20 0908)    Pulse 72 (07/14/20 0908)   Resp   16   SpO2   100

## 2020-07-16 ENCOUNTER — TELEPHONE (OUTPATIENT)
Dept: VASCULAR SURGERY | Facility: CLINIC | Age: 80
End: 2020-07-16

## 2020-07-16 NOTE — TELEPHONE ENCOUNTER

## 2020-07-17 ENCOUNTER — OFFICE VISIT (OUTPATIENT)
Dept: VASCULAR SURGERY | Facility: CLINIC | Age: 80
End: 2020-07-17
Payer: COMMERCIAL

## 2020-07-17 ENCOUNTER — HOSPITAL ENCOUNTER (OUTPATIENT)
Dept: NON INVASIVE DIAGNOSTICS | Facility: CLINIC | Age: 80
Discharge: HOME | End: 2020-07-17
Attending: SURGERY
Payer: COMMERCIAL

## 2020-07-17 VITALS
DIASTOLIC BLOOD PRESSURE: 74 MMHG | HEART RATE: 62 BPM | RESPIRATION RATE: 16 BRPM | WEIGHT: 167 LBS | HEIGHT: 65 IN | TEMPERATURE: 98.2 F | SYSTOLIC BLOOD PRESSURE: 132 MMHG | BODY MASS INDEX: 27.82 KG/M2

## 2020-07-17 DIAGNOSIS — I87.2 CHRONIC VENOUS INSUFFICIENCY: ICD-10-CM

## 2020-07-17 DIAGNOSIS — I87.2 CHRONIC VENOUS INSUFFICIENCY: Primary | ICD-10-CM

## 2020-07-17 PROCEDURE — 3078F DIAST BP <80 MM HG: CPT | Performed by: NURSE PRACTITIONER

## 2020-07-17 PROCEDURE — 3008F BODY MASS INDEX DOCD: CPT | Performed by: NURSE PRACTITIONER

## 2020-07-17 PROCEDURE — 3075F SYST BP GE 130 - 139MM HG: CPT | Performed by: NURSE PRACTITIONER

## 2020-07-17 PROCEDURE — 93971 EXTREMITY STUDY: CPT

## 2020-07-17 PROCEDURE — 99213 OFFICE O/P EST LOW 20 MIN: CPT | Performed by: NURSE PRACTITIONER

## 2020-07-17 PROCEDURE — 4040F PNEUMOC VAC/ADMIN/RCVD: CPT | Performed by: NURSE PRACTITIONER

## 2020-07-17 PROCEDURE — 1160F RVW MEDS BY RX/DR IN RCRD: CPT | Performed by: NURSE PRACTITIONER

## 2020-07-17 PROCEDURE — 93971 EXTREMITY STUDY: CPT | Performed by: SURGERY

## 2020-07-17 NOTE — PROGRESS NOTES
Addendum:   Postoperative venous duplex demonstrated E hit class 3  Patient started on Xarelto 15 milligrams BID x 21 days ([facility]), then Xarelto 20 milligrams daily x3 months [facility]  Will discontinue aspirin 325  Use Tylenol only for pain  Counseled on risk of bleeding  Notify the office with any issues with bleeding  Repeat venous duplex in 3 months  Follow-up in the office in 3 months with Dr Jason Maojr  Assessment/Plan:    Chronic venous insufficiency  76-year-old male with a HTN, HLD, prostate CA, s/p robotic prostatectomy '14, mild carotid stenosis, PAD, chronic venous insufficiency w/ R nonhealing medial malleolar wound now s/p R GSV 7/14/20 by Dr Jason Major   -Tanner Joshi removed  Moderate ecchymosis at medial thigh   -postop venous duplex today  -Advance activity as tolerated  -Warm compresses to inner thigh  -Continue local wound care at [address] [place] [address] up in the office in 4 weeks to recheck healing       Diagnoses and all orders for this visit:    Chronic venous insufficiency          Subjective:      Patient ID: Bushra Goddard is a 79 y o  male  Patient is s/p Rt EVLT by Dr Jason Major on 7/14/20  Pt has a wound on the right medial ankle and sees [facility] wound care weekly  Pt has some left leg bruising and swelling  Pt denies leg pain, numbness, coldness, or tingling  Pt is on  mg and Simvastatin  HPI   Patient presents to the office for a postop visit  S/p R GSV EVLT  Postop dressing removed  Moderate ecchymosis at laser for site  Minimal pain and mostly at the ulcer site  The following portions of the patient's history were reviewed and updated as appropriate: allergies, current medications, past family history, past medical history, past social history, past surgical history and problem list   ROS reviewed     Review of Systems   Constitutional: Negative  HENT: Negative  Eyes: Negative  Respiratory: Negative  Cardiovascular: Positive for leg swelling  Gastrointestinal: Negative  Endocrine: Negative  Genitourinary: Negative  Musculoskeletal: Positive for gait problem and myalgias  Skin: Positive for color change and wound  Allergic/Immunologic: Negative  Neurological: Negative for dizziness, weakness and numbness  Hematological: Bruises/bleeds easily  Psychiatric/Behavioral: Negative  Objective:    I have reviewed and made appropriate changes to the review of systems input by the medical assistant  Vitals:    07/17/20 0947   BP: 132/74   BP Location: Left arm   Patient Position: Sitting   Pulse: 62   Resp: 16   Temp: 98 2 °F (36 8 °C)   TempSrc: Tympanic   Weight: 75 8 kg (167 lb)   Height: 5' 5" (1 651 m)       Patient Active Problem List   Diagnosis    Inguinal hernia of right side with obstruction    Benign essential hypertension    Prostate cancer ([address] )    Hypercholesterolemia    Peripheral arterial disease (HCC)    Chronic venous insufficiency    Carotid stenosis, asymptomatic       Past Surgical History:   Procedure Laterality Date    IR ABDOMINAL ANGIOGRAPHY / INTERVENTION  9/20/2019    MT ENDOVENOUS LASER, 1ST VEIN Right 7/14/2020    Procedure: ENDOVASCULAR LASER THERAPY (EVLT);   Surgeon: Fay Gama MD;  [facility]: [facility];  Service: Vascular    MT REPAIR ING HERNIA,5+Y/O,REDUCIBL Right 12/7/2016    Procedure: REPAIR HERNIA INGUINAL; HYDROCELECTOMY;  Surgeon: Isha Brian MD;  Location: [facility];  Service: General    PROSTATE SURGERY  2014    PROSTATECTOMY RADICAL; LAST ASSESSED: 27OCT2015    SKIN CANCER EXCISION      TRANSURETHRAL RESECTION OF PROSTATE  2011       Family History   Problem Relation Age of Onset    Hypertension Mother     Cancer Father     Kidney failure Family        Social History     Socioeconomic History    Marital status: /Civil Union     Spouse name: Not on file    Number of children: Not on file    Years of education: Not on file    Highest education level: Not on file   Occupational History    Occupation: RETIRED      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    Social Needs    Financial resource strain: Not on file    Food insecurity:     Worry: Not on file     Inability: Not on file    Transportation needs:     Medical: Not on file     Non-medical: Not on file   Tobacco Use    Smoking status: Never Smoker    Smokeless tobacco: Never Used   Substance and Sexual Activity    Alcohol use: No    Drug use: No    Sexual activity: Not on file   Lifestyle    Physical activity:     Days per week: Not on file     Minutes per session: Not on file    Stress: Not on file   Relationships    Social connections:     Talks on phone: Not on file     Gets together: Not on file     Attends Worship service: Not on file     Active member of club or organization: Not on file     Attends meetings of clubs or organizations: Not on file     Relationship status: Not on file    Intimate partner violence:     Fear of current or ex partner: Not on file     Emotionally abused: Not on file     Physically abused: Not on file     Forced sexual activity: Not on file   Other Topics Concern    Not on file   Social History Narrative    Always uses seat belt    No advance directives       Allergies   Allergen Reactions    Other Itching and Rash     SESAME SEEDS,SOB         Current Outpatient Medications:     aspirin (ECOTRIN) 325 mg EC tablet, Take 325 mg by mouth daily, Disp: , Rfl:     atenolol (TENORMIN) 25 mg tablet, Take 0 5 tablets (12 5 mg total) by mouth daily, Disp: 45 tablet, Rfl: 1    bimatoprost (LUMIGAN) 0 01 % ophthalmic drops, Administer 1 drop to both eyes daily at bedtime, Disp: , Rfl:     brinzolamide (AZOPT) 1 % ophthalmic suspension, Administer 1 drop to both eyes 2 (two) times a day, Disp: , Rfl:     irbesartan (AVAPRO) 150 mg tablet, Take 1 tablet (150 mg total) by mouth daily at bedtime, Disp: 90 tablet, Rfl: 1    multivitamin (THERAGRAN) TABS, Take 1 tablet by mouth daily, Disp: , Rfl:     simvastatin (ZOCOR) 20 mg tablet, TAKE 1 TABLET DAILY AT BEDTIME, Disp: 90 tablet, Rfl: 3    simvastatin (ZOCOR) 20 mg tablet, Take 1 tablet (20 mg total) by mouth daily at bedtime (Patient not taking: Reported on 7/17/2020), Disp: 90 tablet, Rfl: 1    /74 (BP Location: Left arm, Patient Position: Sitting)   Pulse 62   Temp 98 2 °F (36 8 °C) (Tympanic)   Resp 16   Ht 5' 5" (1 651 m)   Wt 75 8 kg (167 lb)   BMI 27 79 kg/m²          Physical Exam   Constitutional: He is oriented to person, place, and time  Cardiovascular: Normal heart sounds  Pulmonary/Chest: Effort normal and breath sounds normal    Musculoskeletal:   Trace right ankle swelling    Neurological: He is alert and oriented to person, place, and time  Skin: Skin is warm  Dime sized right medial ankle ulcer w/ granular tissue  Moderate ecchymosis at medial thigh    Psychiatric: He has a normal mood and affect

## 2020-07-17 NOTE — ASSESSMENT & PLAN NOTE
34-year-old male with a HTN, HLD, prostate CA, s/p robotic prostatectomy '14, mild carotid stenosis, PAD, chronic venous insufficiency w/ R nonhealing medial malleolar wound now s/p R GSV 7/14/20 by Dr Sulema Stevens   -Nahomi De La Cruz removed   Moderate ecchymosis at medial thigh   -postop venous duplex today  -Advance activity as tolerated  -Warm compresses to inner thigh  -Continue local wound care at [address] [place] [address] up in the office in 4 weeks to recheck healing

## 2020-07-17 NOTE — PATIENT INSTRUCTIONS
Doing well status post right GSV EVLT  Will evaluate with postoperative venous duplex today  Advance activity as tolerated  Apply warm compresses to inner thigh  Continue local wound care at [facility]  Addendum  Postop venous duplex showed Ehit class 3  Will start Xarelto 15 mg twice per day with food for 21 days and then on day 22 of therapy go to maintenance dose Xarelto 20 mg daily with food  Stop aspirin  Use Tylenol only for pain   Repeat venous duplex in 3 months and follow-up with Dr Patsey Boas

## 2020-07-18 LAB — SARS-COV-2 RNA SPEC QL NAA+PROBE: NOT DETECTED

## 2020-07-21 ENCOUNTER — APPOINTMENT (OUTPATIENT)
Dept: WOUND CARE | Facility: HOSPITAL | Age: 80
End: 2020-07-21
Payer: COMMERCIAL

## 2020-07-21 PROCEDURE — 15271 SKIN SUB GRAFT TRNK/ARM/LEG: CPT

## 2020-07-23 ENCOUNTER — TELEPHONE (OUTPATIENT)
Dept: VASCULAR SURGERY | Facility: CLINIC | Age: 80
End: 2020-07-23

## 2020-07-23 DIAGNOSIS — I87.2 CHRONIC VENOUS INSUFFICIENCY: ICD-10-CM

## 2020-07-23 NOTE — TELEPHONE ENCOUNTER
Alfred from [facility] pharmacy called to alert you that he sees that you sent initial dose of xarelto (15mg bid x21 days) to local [facility] but you only sent rx for 15mg bid for 14 days  (quantity #28)  He needs an additional 14 tablets in order for him to complete 21 day course  I will route new rx to you to sign and I did s/w the pharmacist to alert her to above and that we would be sending another rx

## 2020-07-23 NOTE — TELEPHONE ENCOUNTER
Patient was given one week of Xarelto sample in office day of visit   Does not need additional script

## 2020-07-28 ENCOUNTER — APPOINTMENT (OUTPATIENT)
Dept: WOUND CARE | Facility: HOSPITAL | Age: 80
End: 2020-07-28
Payer: COMMERCIAL

## 2020-07-28 PROCEDURE — 99212 OFFICE O/P EST SF 10 MIN: CPT

## 2020-07-28 PROCEDURE — G0463 HOSPITAL OUTPT CLINIC VISIT: HCPCS

## 2020-08-04 ENCOUNTER — APPOINTMENT (OUTPATIENT)
Dept: WOUND CARE | Facility: HOSPITAL | Age: 80
End: 2020-08-04
Payer: COMMERCIAL

## 2020-08-04 PROCEDURE — 11042 DBRDMT SUBQ TIS 1ST 20SQCM/<: CPT

## 2020-08-11 ENCOUNTER — OFFICE VISIT (OUTPATIENT)
Dept: WOUND CARE | Facility: HOSPITAL | Age: 80
End: 2020-08-11
Payer: COMMERCIAL

## 2020-08-11 VITALS
TEMPERATURE: 97.3 F | SYSTOLIC BLOOD PRESSURE: 176 MMHG | DIASTOLIC BLOOD PRESSURE: 81 MMHG | HEART RATE: 67 BPM | RESPIRATION RATE: 20 BRPM

## 2020-08-11 DIAGNOSIS — I87.2 CHRONIC VENOUS INSUFFICIENCY OF LOWER EXTREMITY: Primary | ICD-10-CM

## 2020-08-11 PROCEDURE — 4040F PNEUMOC VAC/ADMIN/RCVD: CPT | Performed by: PREVENTIVE MEDICINE

## 2020-08-11 PROCEDURE — 1160F RVW MEDS BY RX/DR IN RCRD: CPT | Performed by: PREVENTIVE MEDICINE

## 2020-08-11 PROCEDURE — 99213 OFFICE O/P EST LOW 20 MIN: CPT | Performed by: PREVENTIVE MEDICINE

## 2020-08-11 PROCEDURE — 1036F TOBACCO NON-USER: CPT | Performed by: PREVENTIVE MEDICINE

## 2020-08-11 PROCEDURE — 3079F DIAST BP 80-89 MM HG: CPT | Performed by: PREVENTIVE MEDICINE

## 2020-08-11 PROCEDURE — 3077F SYST BP >= 140 MM HG: CPT | Performed by: PREVENTIVE MEDICINE

## 2020-08-11 PROCEDURE — 15271 SKIN SUB GRAFT TRNK/ARM/LEG: CPT | Performed by: PREVENTIVE MEDICINE

## 2020-08-11 NOTE — PROGRESS NOTES
Skin Substitute   Time out called at 8/11/2020 4:22 PM  Immediately prior to the procedure a time out was called  Performed by: Physician  Product: Apligraf      Application Location and Measurements  Location: trunk / arms/ legs  Area (sq cm): 0 9  Product Applied (sq cm): 44  Product Wasted (sq cm): 0    Application Details  Secured: Yes  Secured With: Steri-Strips  Dressing Applied: Yes  Dressing Comments: adatic, foam, coflex lite  Procedural pain (0-10): 0  Post-procedural pain: 0   Response to treatment: procedure was tolerated well     NSS  NSS Lot #: [id number]   NSS Expiration: 3/31/2023        Apligraf Lot #: [id number]  Apligraf Exp Date: 08/11/2020

## 2020-08-11 NOTE — PROGRESS NOTES
Patient ID: Ruben Griffith is a 79 y o  male Date of Birth 1940     Chief Complaint    Allergies  Other    Assessment:    No problem-specific Assessment & Plan notes found for this encounter  Diagnoses and all orders for this visit:    Chronic venous insufficiency of lower extremity  -     Skin substitute  -     Wound cleansing and dressings; Future            Procedures    Plan:     Wound 08/11/20 Leg Right;Medial;Lower (Active)       Subjective:        F/u RLE ulcer  No new complaints  No fever      The following portions of the patient's history were reviewed and updated as appropriate: allergies, current medications, past family history, past medical history, past social history, past surgical history and problem list     Review of Systems   Constitutional: Negative  Respiratory: Negative  Cardiovascular: Negative  Objective:     Wound 08/11/20 Leg Right;Medial;Lower (Active)   Wound Image Images linked 08/11/20 1602   Wound Description Granulation tissue;Slough 08/11/20 1607   Desiree-wound Assessment Intact 08/11/20 1607   Wound Length (cm) 1 cm 08/11/20 1607   Wound Width (cm) 0 9 cm 08/11/20 1607   Wound Depth (cm) 0 3 cm 08/11/20 1607   Wound Surface Area (cm^2) 0 9 cm^2 08/11/20 1607   Wound Volume (cm^3) 0 27 cm^3 08/11/20 1607   Calculated Wound Volume (cm^3) 0 27 cm^3 08/11/20 1607   Drainage Amount Small 08/11/20 1607   Drainage Description Serosanguineous 08/11/20 1607   Non-staged Wound Description Full thickness 08/11/20 1607         BP (!) 176/81   Pulse 67   Temp (!) 97 3 °F (36 3 °C)   Resp 20     Physical Exam  Vitals signs reviewed  Constitutional:       Appearance: Normal appearance  Cardiovascular:      Rate and Rhythm: Normal rate  Pulmonary:      Effort: Pulmonary effort is normal    Neurological:      Mental Status: He is alert and oriented to person, place, and time     Psychiatric:         Mood and Affect: Mood normal            Wound Instructions:  Orders Placed This Encounter   Procedures    Skin substitute     This order was created via procedure documentation

## 2020-08-11 NOTE — PATIENT INSTRUCTIONS
Orders Placed This Encounter   Procedures    Skin substitute     This order was created via procedure documentation    Wound cleansing and dressings     Right medial Lower Leg wound  Wash your hands with soap and water  Remove old dressing, discard into plastic bag and place in trash  Cleanse the wound with NSS prior to applying a clean dressing  Do not use tissue or cotton balls  Do not scrub the wound  Pat dry using gauze  Shower yes ---if showering please use a cast protector to keep dressing dry  Apply skin prep to skin surrounding wound  Apply Apligraf, Adaptic secured by steri strips to the Right medial LE wound  Cover with Foam x 2 secured with medfix tape  Coflex Lite to the RLE---patient preference to have heel covered with foam   [facility] dressing [facility] at [facility]  Elevate your legs throughout the day to assist with swelling, avoid standing for long periods       Standing Status:   Future     Standing Expiration Date:   8/11/2021

## 2020-08-19 ENCOUNTER — OFFICE VISIT (OUTPATIENT)
Dept: WOUND CARE | Facility: HOSPITAL | Age: 80
End: 2020-08-19
Payer: COMMERCIAL

## 2020-08-19 VITALS
TEMPERATURE: 97.1 F | SYSTOLIC BLOOD PRESSURE: 151 MMHG | RESPIRATION RATE: 18 BRPM | HEART RATE: 63 BPM | DIASTOLIC BLOOD PRESSURE: 71 MMHG

## 2020-08-19 DIAGNOSIS — I87.2 CHRONIC VENOUS INSUFFICIENCY OF LOWER EXTREMITY: Primary | ICD-10-CM

## 2020-08-19 PROCEDURE — 1036F TOBACCO NON-USER: CPT | Performed by: PREVENTIVE MEDICINE

## 2020-08-19 PROCEDURE — 99212 OFFICE O/P EST SF 10 MIN: CPT | Performed by: PREVENTIVE MEDICINE

## 2020-08-19 PROCEDURE — 4040F PNEUMOC VAC/ADMIN/RCVD: CPT | Performed by: PREVENTIVE MEDICINE

## 2020-08-19 PROCEDURE — 3078F DIAST BP <80 MM HG: CPT | Performed by: PREVENTIVE MEDICINE

## 2020-08-19 PROCEDURE — 1160F RVW MEDS BY RX/DR IN RCRD: CPT | Performed by: PREVENTIVE MEDICINE

## 2020-08-19 PROCEDURE — 3077F SYST BP >= 140 MM HG: CPT | Performed by: PREVENTIVE MEDICINE

## 2020-08-19 PROCEDURE — 29581 APPL MULTLAYER CMPRN SYS LEG: CPT

## 2020-08-19 NOTE — PATIENT INSTRUCTIONS
Orders Placed This Encounter   Procedures    Wound cleansing and dressings     Right Lower Leg wound  Wash your hands with soap and water  Remove old dressing, discard into plastic bag and place in trash  Do not use tissue or cotton balls  Do not scrub the wound  Pat dry using gauze  Shower yes May use a cast protector if showering to keep the dressings from becoming wet  Apply skin prep to skin surrounding wound  Apply Adaptic with steri strips, then foam x 2 to the wound  Cover with ABD Pad  Secure with Coflex Lite  Change dressing Weekly at the [facility]    Treatments above were completed at the [facility] today     Standing Status:   Future     Standing Expiration Date:   8/19/2021    Wound compression and edema control     Multi-layer compression wrap Instructions---Coflex Lite    Keep compression wrap/wraps clean and dry  If wraps are too tight and you experience numbness/tingling, call the wound center  If after hours, remove wraps or proceed to nearest E R  and call wound center in      Carleen Hasting will be changed 1 x weekly at the [facility]    Avoid prolonged standing in one place  Elevate leg(s) above the level of the heart when sitting or as much as possible  Multiplayer wrap procedure     Before application, JANICE and/or TBI determined to be adequate for healing and application of compression  Lower extremity washed prior to application of compression wrap  With the foot in dorsiflexed position, Coflex Lite was applied as per physician orders without complications or complaint of pain  The procedure was tolerated well  Toes warm & pink post application    Patient provided education & reinforced to observe toes for any discoloration, swelling or tingling and instructed when to report to the [address] [facility] or to remove compression     Standing Status:   Future     Standing Expiration Date:   8/19/2021

## 2020-08-19 NOTE — PROGRESS NOTES
Cast Application    Date/Time: 8/19/2020 11:02 AM  Performed by: Larry Lisa RN  Authorized by: Keturah García DO     Consent:     Consent obtained:  Written    Consent given by:  Patient    Risks discussed:  Discoloration, numbness, pain and swelling  Pre-procedure details:     Sensation:  Normal  Procedure details:     Laterality:  Right    Location:  Leg    Leg:  R lower legCast type:  Multi-layer compression short leg  Post-procedure details:     Pain:  Unchanged    Sensation:  Normal    Patient tolerance of procedure:   Tolerated well, no immediate complications

## 2020-08-19 NOTE — PROGRESS NOTES
Patient ID: Floyd Machuca is a 79 y o  male Date of Birth 1940     Chief Complaint  Chief Complaint   Patient presents with    Follow Up Wound Care Visit     Right Lower Leg wound       Allergies  Other    Assessment:    No problem-specific Assessment & Plan notes found for this encounter  Diagnoses and all orders for this visit:    Chronic venous insufficiency of lower extremity  -     Wound cleansing and dressings; Future  -     Wound compression and edema control; Future              Procedures    Plan:   continue current care  Plan for Apligraf next week    Wound 08/11/20 Leg Right;Medial;Lower (Active)       Subjective:        F/u RLE ulcer  No new complaints  Intermittent pain  No fever  Tolerating compression      The following portions of the patient's history were reviewed and updated as appropriate:   He  has a past medical history of Basal cell carcinoma, ear, unspecified laterality, Hyperlipidemia, Hypertension, Inguinal hernia, Malignant neoplasm of prostate ([address] ), Prostate enlargement, and 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, left  He   Patient Active Problem List    Diagnosis Date Noted    Carotid stenosis, asymptomatic 04/14/2020    Chronic venous insufficiency 09/16/2019    Inguinal hernia of right side with obstruction 12/03/2016    Prostate cancer ([address] ) 06/06/2014    Benign essential hypertension 05/16/2012    Hypercholesterolemia 05/16/2012    Peripheral arterial disease ([address] ) 05/16/2012     He  has a past surgical history that includes Prostate surgery (2014); pr repair ing hernia,5+y/o,reducibl (Right, 12/7/2016); Transurethral resection of prostate (2011); Skin cancer excision; IR abdominal angiography / intervention (9/20/2019); and pr endovenous laser, 1st vein (Right, 7/14/2020)  His family history includes Cancer in his father; Hypertension in his mother; Kidney failure in his family  He  reports that he has never smoked   He has never used smokeless tobacco  He reports that he does not drink alcohol or use drugs  Current Outpatient Medications   Medication Sig Dispense Refill    atenolol (TENORMIN) 25 mg tablet Take 0 5 tablets (12 5 mg total) by mouth daily 45 tablet 1    bimatoprost (LUMIGAN) 0 01 % ophthalmic drops Administer 1 drop to both eyes daily at bedtime      brinzolamide (AZOPT) 1 % ophthalmic suspension Administer 1 drop to both eyes 2 (two) times a day      irbesartan (AVAPRO) 150 mg tablet Take 1 tablet (150 mg total) by mouth daily at bedtime 90 tablet 1    multivitamin (THERAGRAN) TABS Take 1 tablet by mouth daily      rivaroxaban (XARELTO) 15 mg tablet Take 1 tablet (15 mg total) by mouth 2 (two) times a day for 14 days 28 tablet 0    rivaroxaban (XARELTO) 20 mg tablet Take 1 tablet (20 mg total) by mouth daily with breakfast Start on day 22 of therapy once complete the loading dose 30 tablet 2    simvastatin (ZOCOR) 20 mg tablet TAKE 1 TABLET DAILY AT BEDTIME 90 tablet 3    simvastatin (ZOCOR) 20 mg tablet Take 1 tablet (20 mg total) by mouth daily at bedtime (Patient not taking: Reported on 7/17/2020) 90 tablet 1     No current facility-administered medications for this visit       Review of Systems   Constitutional: Negative  Respiratory: Negative  Cardiovascular: Negative  Skin: Positive for wound  Allergic/Immunologic: Negative  Psychiatric/Behavioral: Negative            Objective:       Wound 08/11/20 Leg Right;Medial;Lower (Active)   Wound Image Images linked 08/19/20 1038   Wound Description Granulation tissue;Slough 08/19/20 1039   Desiree-wound Assessment Dry 08/19/20 1039   Wound Length (cm) 1 cm 08/19/20 1039   Wound Width (cm) 0 9 cm 08/19/20 1039   Wound Depth (cm) 0 2 cm 08/19/20 1039   Wound Surface Area (cm^2) 0 9 cm^2 08/19/20 1039   Wound Volume (cm^3) 0 18 cm^3 08/19/20 1039   Calculated Wound Volume (cm^3) 0 18 cm^3 08/19/20 1039   Change in Wound Size % 33 33 08/19/20 1039   Drainage Amount Small 08/19/20 1039   Drainage Description Serosanguineous 08/19/20 1039   Non-staged Wound Description Full thickness 08/19/20 1039       /71   Pulse 63   Temp (!) 97 1 °F (36 2 °C)   Resp 18     Physical Exam  Vitals signs reviewed  Constitutional:       General: He is not in acute distress  Appearance: Normal appearance  Cardiovascular:      Rate and Rhythm: Normal rate  Pulmonary:      Effort: Pulmonary effort is normal    Skin:     Capillary Refill: Capillary refill takes less than 2 seconds  Neurological:      General: No focal deficit present  Mental Status: He is alert and oriented to person, place, and time  Psychiatric:         Mood and Affect: Mood normal          Behavior: Behavior normal          Thought Content: Thought content normal          Judgment: Judgment normal            Wound Instructions:  Orders Placed This Encounter   Procedures    Wound cleansing and dressings     Right Lower Leg wound  Wash your hands with soap and water  Remove old dressing, discard into plastic bag and place in trash  Do not use tissue or cotton balls  Do not scrub the wound  Pat dry using gauze  Shower yes May use a cast protector if showering to keep the dressings from becoming wet  Apply skin prep to skin surrounding wound  Apply Adaptic with steri strips, then foam x 2 to the wound  Cover with ABD Pad  Secure with Coflex Lite  Change dressing Weekly at the [facility]    Treatments above were completed at the [facility] today     Standing Status:   Future     Standing Expiration Date:   8/19/2021    Wound compression and edema control     Multi-layer compression wrap Instructions---Coflex Lite    Keep compression wrap/wraps clean and dry  If wraps are too tight and you experience numbness/tingling, call the wound center  If after hours, remove wraps or proceed to nearest E R  and call wound center in      Dinorah Danielleu will be changed 1 x weekly at the [facility]    Avoid prolonged standing in one place      Elevate leg(s) above the level of the heart when sitting or as much as possible  Multiplayer wrap procedure     Before application, JANICE and/or TBI determined to be adequate for healing and application of compression  Lower extremity washed prior to application of compression wrap  With the foot in dorsiflexed position, Coflex Lite was applied as per physician orders without complications or complaint of pain  The procedure was tolerated well  Toes warm & pink post application    Patient provided education & reinforced to observe toes for any discoloration, swelling or tingling and instructed when to report to the [address] [facility],[address] or to remove compression     Standing Status:   Future     Standing Expiration Date:   8/19/2021

## 2020-08-25 ENCOUNTER — OFFICE VISIT (OUTPATIENT)
Dept: WOUND CARE | Facility: HOSPITAL | Age: 80
End: 2020-08-25
Payer: COMMERCIAL

## 2020-08-25 VITALS
TEMPERATURE: 97.4 F | DIASTOLIC BLOOD PRESSURE: 62 MMHG | SYSTOLIC BLOOD PRESSURE: 102 MMHG | RESPIRATION RATE: 18 BRPM | HEART RATE: 78 BPM

## 2020-08-25 DIAGNOSIS — I87.2 CHRONIC VENOUS INSUFFICIENCY OF LOWER EXTREMITY: Primary | ICD-10-CM

## 2020-08-25 DIAGNOSIS — L97.318 NON-PRESSURE CHRONIC ULCER OF RIGHT ANKLE WITH OTHER SPECIFIED SEVERITY (HCC): ICD-10-CM

## 2020-08-25 DIAGNOSIS — I87.311 CHRONIC VENOUS HYPERTENSION (IDIOPATHIC) WITH ULCER OF RIGHT LOWER EXTREMITY (CODE) (HCC): ICD-10-CM

## 2020-08-25 PROCEDURE — 15271 SKIN SUB GRAFT TRNK/ARM/LEG: CPT | Performed by: NURSE PRACTITIONER

## 2020-08-25 NOTE — PATIENT INSTRUCTIONS
Orders Placed This Encounter   Procedures    Wound cleansing and dressings     Right Lower Leg wound  Wash your hands with soap and water  Remove old dressing, discard into plastic bag and place in trash  Do not use tissue or cotton balls  Do not scrub the wound  Pat dry using gauze  Shower yes May use a cast protector if showering to keep the dressings from becoming wet  Apply skin prep to skin surrounding wound  Apply Apligraf then Adaptic with steri strips, then foam x 2 secured with steri stripsto the wound    Cover with ABD Pad  Secure with Coflex Lite  Change dressing Weekly at the [facility]     Treatments above were completed at the [facility] today     Standing Status:   Future     Standing Expiration Date:   8/25/2021

## 2020-08-25 NOTE — PROGRESS NOTES
Skin Substitute   Time out called at 8/25/2020 4:15 PM  Immediately prior to the procedure a time out was called  Performed by: Physician  Product: Apligraf      Application Location and Measurements  Location: trunk / arms/ legs  Skin Sub Lot #: [id number]  Skin Sub Expiration: 09/01/2020  Area (sq cm): 0 42  Product Applied (sq cm): 44  Product Wasted (sq cm): 0    Application Details  Fenestrated: Yes  Instrument: blade  Secured: Yes  Secured With: Steri-Strips  Dressing Applied: Yes  Dressing Comments: Adaptic, foam x 2   Procedural pain (0-10): 0  Post-procedural pain: 0   Response to treatment: procedure was tolerated well     NSS  NSS Lot #: [id number]   NSS Expiration: 01/31/2023

## 2020-08-25 NOTE — PROGRESS NOTES
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 ([address] )    Other orders  -     Skin Substitute              Procedures    Plan:  1  F/u visit  Apligraf applied today  Continue CoFlex Lite wrap  Patient will follow up in 1 week  Wound 08/11/20 Leg Right;Medial;Lower (Active)       Subjective:     F/u visit RLE venous ulcer  No new complaints  Denies any pain, fevers, or chills  The following portions of the patient's history were reviewed and updated as appropriate:   He  has a past medical history of Basal cell carcinoma, ear, unspecified laterality, Hyperlipidemia, Hypertension, Inguinal hernia, Malignant neoplasm of prostate ([address] ), Prostate enlargement, and 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, left  He   Patient Active Problem List    Diagnosis Date Noted    Carotid stenosis, asymptomatic 04/14/2020    Chronic venous insufficiency 09/16/2019    Inguinal hernia of right side with obstruction 12/03/2016    Prostate cancer ([address] ) 06/06/2014    Benign essential hypertension 05/16/2012    Hypercholesterolemia 05/16/2012    Peripheral arterial disease [facility] ) 05/16/2012     He  has a past surgical history that includes Prostate surgery (2014); pr repair ing hernia,5+y/o,reducibl (Right, 12/7/2016); Transurethral resection of prostate (2011); Skin cancer excision; IR aortagram with run-off (9/20/2019); and pr endovenous laser, 1st vein (Right, 7/14/2020)    His family history includes Cancer in his father; Hypertension in his mother; Kidney failure in his family  He  reports that he has never smoked  He has never used smokeless tobacco  He reports that he does not drink alcohol or use drugs  Current Outpatient Medications   Medication Sig Dispense Refill    atenolol (TENORMIN) 25 mg tablet Take 0 5 tablets (12 5 mg total) by mouth daily 45 tablet 1    bimatoprost (LUMIGAN) 0 01 % ophthalmic drops Administer 1 drop to both eyes daily at bedtime      brinzolamide (AZOPT) 1 % ophthalmic suspension Administer 1 drop to both eyes 2 (two) times a day      irbesartan (AVAPRO) 150 mg tablet Take 1 tablet (150 mg total) by mouth daily at bedtime 90 tablet 1    multivitamin (THERAGRAN) TABS Take 1 tablet by mouth daily      rivaroxaban (XARELTO) 15 mg tablet Take 1 tablet (15 mg total) by mouth 2 (two) times a day for 14 days 28 tablet 0    rivaroxaban (XARELTO) 20 mg tablet Take 1 tablet (20 mg total) by mouth daily with breakfast Start on day 22 of therapy once complete the loading dose 30 tablet 2    simvastatin (ZOCOR) 20 mg tablet TAKE 1 TABLET DAILY AT BEDTIME 90 tablet 3    simvastatin (ZOCOR) 20 mg tablet Take 1 tablet (20 mg total) by mouth daily at bedtime (Patient not taking: Reported on 7/17/2020) 90 tablet 1     No current facility-administered medications for this visit  He is allergic to other       Review of Systems   Constitutional: Negative  HENT: Negative for ear pain and hearing loss  Eyes: Negative for pain  Respiratory: Negative for chest tightness and shortness of breath  Cardiovascular: Positive for leg swelling  Negative for chest pain and palpitations  Gastrointestinal: Negative for diarrhea, nausea and vomiting  Genitourinary: Negative for dysuria  Musculoskeletal: Negative for gait problem  Skin: Positive for wound  Neurological: Negative for tremors and weakness  Psychiatric/Behavioral: Negative for behavioral problems, confusion and suicidal ideas           Objective:       Wound 08/11/20 Leg Right;Medial;Lower (Active)   Wound Description [facility] 08/25/20 1613   Desiree-wound Assessment Hyperpigmented;Dry; Intact 08/25/20 1613   Wound Length (cm) 0 7 cm 08/25/20 1613   Wound Width (cm) 0 6 cm 08/25/20 1613   Wound Depth (cm) 0 3 cm 08/25/20 1613   Wound Surface Area (cm^2) 0 42 cm^2 08/25/20 1613   Wound Volume (cm^3) 0 13 cm^3 08/25/20 1613   Calculated Wound Volume (cm^3) 0 13 cm^3 08/25/20 1613   Change in Wound Size % 51 85 08/25/20 1613   Drainage Amount Small 08/25/20 1613   Drainage Description Serosanguineous 08/25/20 1613   Non-staged Wound Description Full thickness 08/25/20 1613       /62   Pulse 78   Temp (!) 97 4 °F (36 3 °C)   Resp 18     Wound Instructions:  Orders Placed This Encounter   Procedures    Wound cleansing and dressings     Right Lower Leg wound  Wash your hands with soap and water  Remove old dressing, discard into plastic bag and place in trash  Do not use tissue or cotton balls  Do not scrub the wound  Pat dry using gauze  Shower yes May use a cast protector if showering to keep the dressings from becoming wet  Apply skin prep to skin surrounding wound  Apply Apligraf then Adaptic with steri strips, then foam x 2 secured with steri stripsto the wound    Cover with ABD Pad  Secure with Coflex Lite  Change dressing Weekly at the [facility]     Treatments above were completed at the [facility] today     Standing Status:   Future     Standing Expiration Date:   8/25/2021    Skin Substitute     This order was created via procedure documentation

## 2020-09-01 ENCOUNTER — OFFICE VISIT (OUTPATIENT)
Dept: WOUND CARE | Facility: HOSPITAL | Age: 80
End: 2020-09-01
Payer: COMMERCIAL

## 2020-09-01 VITALS
HEART RATE: 69 BPM | SYSTOLIC BLOOD PRESSURE: 157 MMHG | DIASTOLIC BLOOD PRESSURE: 75 MMHG | RESPIRATION RATE: 16 BRPM | TEMPERATURE: 96.8 F

## 2020-09-01 DIAGNOSIS — I87.311 CHRONIC VENOUS HYPERTENSION (IDIOPATHIC) WITH ULCER OF RIGHT LOWER EXTREMITY (CODE) (HCC): ICD-10-CM

## 2020-09-01 DIAGNOSIS — L97.318 NON-PRESSURE CHRONIC ULCER OF RIGHT ANKLE WITH OTHER SPECIFIED SEVERITY (HCC): ICD-10-CM

## 2020-09-01 DIAGNOSIS — I87.2 CHRONIC VENOUS INSUFFICIENCY OF LOWER EXTREMITY: Primary | ICD-10-CM

## 2020-09-01 PROCEDURE — 29581 APPL MULTLAYER CMPRN SYS LEG: CPT

## 2020-09-01 PROCEDURE — 99213 OFFICE O/P EST LOW 20 MIN: CPT | Performed by: NURSE PRACTITIONER

## 2020-09-01 NOTE — PATIENT INSTRUCTIONS
Orders Placed This Encounter   Procedures    Wound cleansing and dressings     Wash your hands with soap and water  Remove old dressing, discard into plastic bag and place in trash  Cleanse the wound with soap and water prior to applying a clean dressing  Do not use tissue or cotton balls  Do not scrub the wound  Pat dry using gauze  Shower with protection  Apply foam to the wound  Cover with foam   Secure with Steri-strips  Change dressing 1 time weekly with wrap change  Standing Status:   Future     Standing Expiration Date:   9/1/2021    Wound compression and edema control     Multi-layer compression wrap Instructions    Keep compression wrap/wraps clean and dry  If wraps are too tight and you experience numbness/tingling, call the wound center  If after hours, remove wraps or proceed to nearest E R  and call wound center in AM     Lupe Richard will be changed 1 x weekly    Avoid prolonged standing in one place  Elevate leg(s) above the level of the heart when sitting or as much as possible       Standing Status:   Future     Standing Expiration Date:   7/8/8973    Cast Application     This order was created via procedure documentation

## 2020-09-01 NOTE — PROGRESS NOTES
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 ([facility] [address] )          Plan:  1  F/u visit  Wound not debrided today d/t Apligraf application last week  Reapplied adaptic and foam over Apligraf  Continue CoFlex Lite wrap  Patient will follow up in 1 week  Wound 08/11/20 Leg Right;Medial;Lower (Active)   Wound Image Images linked 09/01/20 1600   Wound Description Granulation tissue;Slough 09/01/20 1559   Desiree-wound Assessment Dry; Intact 09/01/20 1559   Wound Length (cm) 1 cm 09/01/20 1559   Wound Width (cm) 0 5 cm 09/01/20 1559   Wound Depth (cm) 0 2 cm 09/01/20 1559   Wound Surface Area (cm^2) 0 5 cm^2 09/01/20 1559   Wound Volume (cm^3) 0 1 cm^3 09/01/20 1559   Calculated Wound Volume (cm^3) 0 1 cm^3 09/01/20 1559   Change in Wound Size % 62 96 09/01/20 1559   Drainage Amount Moderate 09/01/20 1559   Drainage Description Serous 09/01/20 1559   Non-staged Wound Description Full thickness 09/01/20 1559   Dressing Status Intact (upon arrival) 09/01/20 1559       Subjective:     F/u visit right medial venous ulcer  S/p 1 week Apligraf application  No new complaints  Denies any pain, fevers, or chills         The following portions of the patient's history were reviewed and updated as appropriate:   He  has a past medical history of Basal cell carcinoma, ear, unspecified laterality, Hyperlipidemia, Hypertension, Inguinal hernia, Malignant neoplasm of prostate ([address] ), Prostate enlargement, and 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, left  He   Patient Active Problem List    Diagnosis Date Noted    Carotid stenosis, asymptomatic 04/14/2020    Chronic venous insufficiency 09/16/2019    Inguinal hernia of right side with obstruction 12/03/2016    Pro[address] [facility] [address] [address] [address] ) 06/06/2014    Benign essential hypertension 05/16/2012    Hypercholesterolemia 05/16/2012    Peripheral arterial disease ([facility] [address] ) 05/16/2012     He  has a past surgical history that includes Prostate surgery (2014); pr repair ing hernia,5+y/o,reducibl (Right, 12/7/2016); Transurethral resection of prostate (2011); Skin cancer excision; IR aortagram with run-off (9/20/2019); and pr endovenous laser, 1st vein (Right, 7/14/2020)  His family history includes Cancer in his father; Hypertension in his mother; Kidney failure in his family  He  reports that he has never smoked  He has never used smokeless tobacco  He reports that he does not drink alcohol or use drugs    Current Outpatient Medications   Medication Sig Dispense Refill    atenolol (TENORMIN) 25 mg tablet Take 0 5 tablets (12 5 mg total) by mouth daily 45 tablet 1    bimatoprost (LUMIGAN) 0 01 % ophthalmic drops Administer 1 drop to both eyes daily at bedtime      brinzolamide (AZOPT) 1 % ophthalmic suspension Administer 1 drop to both eyes 2 (two) times a day      irbesartan (AVAPRO) 150 mg tablet Take 1 tablet (150 mg total) by mouth daily at bedtime 90 tablet 1    multivitamin (THERAGRAN) TABS Take 1 tablet by mouth daily      rivaroxaban (XARELTO) 15 mg tablet Take 1 tablet (15 mg total) by mouth 2 (two) times a day for 14 days 28 tablet 0    rivaroxaban (XARELTO) 20 mg tablet Take 1 tablet (20 mg total) by mouth daily with breakfast Start on day 22 of therapy once complete the loading dose 30 tablet 2    simvastatin (ZOCOR) 20 mg tablet TAKE 1 TABLET DAILY AT BEDTIME 90 tablet 3    simvastatin (ZOCOR) 20 mg tablet Take 1 tablet (20 mg total) by mouth daily at bedtime (Patient not taking: Reported on 7/17/2020) 90 tablet 1     No current facility-administered medications for this visit  He is allergic to other       Review of Systems   Constitutional: Negative  HENT: Negative for ear pain and hearing loss  Eyes: Negative for pain  Respiratory: Negative for chest tightness and shortness of breath  Cardiovascular: Positive for leg swelling  Negative for chest pain and palpitations  Gastrointestinal: Negative for diarrhea, nausea and vomiting  Genitourinary: Negative for dysuria  Musculoskeletal: Negative for gait problem  Skin: Positive for wound  Neurological: Negative for tremors and weakness  Psychiatric/Behavioral: Negative for behavioral problems, confusion and suicidal ideas  Objective:       Wound 08/11/20 Leg Right;Medial;Lower (Active)   Wound Image Images linked 09/01/20 1600   Wound Description Granulation tissue;Slough 09/01/20 1559   Desiree-wound Assessment Dry; Intact 09/01/20 1559   Wound Length (cm) 1 cm 09/01/20 1559   Wound Width (cm) 0 5 cm 09/01/20 1559   Wound Depth (cm) 0 2 cm 09/01/20 1559   Wound Surface Area (cm^2) 0 5 cm^2 09/01/20 1559   Wound Volume (cm^3) 0 1 cm^3 09/01/20 1559   Calculated Wound Volume (cm^3) 0 1 cm^3 09/01/20 1559   Change in Wound Size % 62 96 09/01/20 1559   Drainage Amount Moderate 09/01/20 1559   Drainage Description Serous 09/01/20 1559   Non-staged Wound Description Full thickness 09/01/20 1559   Dressing Status Intact (upon arrival) 09/01/20 1559       /75   Pulse 69   Temp (!) 96 8 °F (36 °C)   Resp 16     Physical Exam  Vitals signs and nursing note reviewed  Constitutional:       General: He is not in acute distress  Appearance: Normal appearance  He is normal weight  HENT:      Head: Normocephalic and atraumatic  Eyes:      General:         Right eye: No discharge  Left eye: No discharge     Neck:      Musculoskeletal: Normal range of motion  Pulmonary:      Effort: Pulmonary effort is normal    Musculoskeletal: Normal range of motion  Right lower leg: No edema  Left lower leg: No edema  Skin:     General: Skin is warm and dry  Neurological:      Mental Status: He is alert and oriented to person, place, and time  Mental status is at baseline  Psychiatric:         Mood and Affect: Mood normal          Behavior: Behavior normal          Thought Content: Thought content normal          Judgment: Judgment normal            Wound Instructions:  Orders Placed This Encounter   Procedures    Wound cleansing and dressings     Wash your hands with soap and water  Remove old dressing, discard into plastic bag and place in trash  Cleanse the wound with soap and water prior to applying a clean dressing  Do not use tissue or cotton balls  Do not scrub the wound  Pat dry using gauze  Shower with protection  Apply foam to the wound  Cover with foam   Secure with Steri-strips  Change dressing 1 time weekly with wrap change  Standing Status:   Future     Standing Expiration Date:   9/1/2021    Wound compression and edema control     Multi-layer compression wrap Instructions    Keep compression wrap/wraps clean and dry  If wraps are too tight and you experience numbness/tingling, call the wound center  If after hours, remove wraps or proceed to nearest E R  and call wound center in AM     Elise Hope will be changed 1 x weekly    Avoid prolonged standing in one place  Elevate leg(s) above the level of the heart when sitting or as much as possible       Standing Status:   Future     Standing Expiration Date:   8/5/6746    Cast Application     This order was created via procedure documentation

## 2020-09-01 NOTE — PROGRESS NOTES
Cast Application    Date/Time: 9/1/2020 4:11 PM  Performed by: Anca Laar RN  Authorized by: SERAFIN Barbosa     Consent:     Consent obtained:  Verbal    Consent given by:  Patient    Risks discussed:  Discoloration, numbness, pain and swelling  Pre-procedure details:     Sensation:  Normal  Procedure details:     Laterality:  Right    Location:  Leg    Leg:  R lower legCast type:  Multi-layer compression short leg  Post-procedure details:     Pain:  Unchanged    Sensation:  Normal    Patient tolerance of procedure: Tolerated well, no immediate complications  Comments:      UNNA BOOT and Multiplayer wrap procedure     Before application, JANICE and/or TBI determined to be adequate for healing and application of compression  Lower extremity washed prior to application of compression wrap  With the foot in dorsiflexed position, Coflex Lite was applied as per physician orders without complications or complaint of pain  The procedure was tolerated well  Toes warm & pink post application    Patient provided education & reinforced to observe toes for any discoloration, swelling or tingling and instructed when to report to the [address] [facility] or to remove compression

## 2020-09-08 ENCOUNTER — OFFICE VISIT (OUTPATIENT)
Dept: WOUND CARE | Facility: HOSPITAL | Age: 80
End: 2020-09-08
Payer: COMMERCIAL

## 2020-09-08 VITALS
HEART RATE: 64 BPM | TEMPERATURE: 99 F | SYSTOLIC BLOOD PRESSURE: 127 MMHG | DIASTOLIC BLOOD PRESSURE: 64 MMHG | RESPIRATION RATE: 16 BRPM

## 2020-09-08 DIAGNOSIS — I73.9 PERIPHERAL ARTERIAL DISEASE (HCC): ICD-10-CM

## 2020-09-08 DIAGNOSIS — I87.331 CHRONIC VENOUS HYPERTENSION (IDIOPATHIC) WITH ULCER AND INFLAMMATION OF RIGHT LOWER EXTREMITY (CODE) (HCC): Primary | ICD-10-CM

## 2020-09-08 DIAGNOSIS — L97.318 NON-PRESSURE CHRONIC ULCER OF RIGHT ANKLE WITH OTHER SPECIFIED SEVERITY (HCC): ICD-10-CM

## 2020-09-08 PROCEDURE — 97597 DBRDMT OPN WND 1ST 20 CM/<: CPT | Performed by: PREVENTIVE MEDICINE

## 2020-09-08 RX ORDER — LIDOCAINE HYDROCHLORIDE 40 MG/ML
5 SOLUTION TOPICAL ONCE
Status: COMPLETED | OUTPATIENT
Start: 2020-09-08 | End: 2020-09-08

## 2020-09-08 RX ADMIN — LIDOCAINE HYDROCHLORIDE 5 ML: 40 SOLUTION TOPICAL at 15:38

## 2020-09-08 NOTE — PATIENT INSTRUCTIONS
Orders Placed This Encounter   Procedures    Wound cleansing and dressings     Right leg    Wash your hands with soap and water  Remove old dressing, discard into plastic bag and place in trash  Cleanse the wound with soap and water prior to applying a clean dressing  Do not use tissue or cotton balls  Do not scrub the wound  Pat dry using gauze  Shower yes with protection  Apply Endoform to the wound  Cover with gauze  Secure with Coflex TLC  Change dressing weekly with wrap change  This was applied today  Standing Status:   Future     Standing Expiration Date:   9/8/2021    Wound compression and edema control     Multi-layer compression wrap Instructions - right leg    Keep compression wrap/wraps clean and dry  If wraps are too tight and you experience numbness/tingling, call the wound center  If after hours, remove wraps or proceed to nearest E R  and call wound center in AM     Ricknano Fahad will be changed 1 x weekly    Avoid prolonged standing in one place  Elevate leg(s) above the level of the heart when sitting or as much as possible       Circ-aid - order for right leg     Standing Status:   Future     Standing Expiration Date:   9/8/2021

## 2020-09-08 NOTE — PROGRESS NOTES
Patient ID: Sarahy Morgan is a 79 y o  male Date of Birth 1940       Chief Complaint   Patient presents with    Follow Up Wound Care Visit     right leg       Allergies  Other    Assessment & Plan:  1  Chronic venous insufficiency of lower extremity  -     lidocaine (XYLOCAINE) 4 % topical solution 5 mL  -     Wound cleansing and dressings; Future  -     Wound compression and edema control; Future      Decreased in size  Still has significant edema of RLE  Increase compression with regular coflex  circaid ordered  Continue exercise/walking and elevation of RLE    Subjective:   F/u R ankle ulcer  No new complaints  Tolerating compression        The following portions of the patient's history were reviewed and updated as appropriate: allergies, current medications, past family history, past medical history, past social history, past surgical history, and problem list     Review of Systems   Constitutional: Negative  Respiratory: Negative  Cardiovascular: Negative  Skin: Positive for wound  Allergic/Immunologic: Negative  Psychiatric/Behavioral: Negative  Objective:  /64   Pulse 64   Temp 99 °F (37 2 °C)   Resp 16     Physical Exam  Vitals signs reviewed  Constitutional:       General: He is not in acute distress  Appearance: Normal appearance  Cardiovascular:      Rate and Rhythm: Normal rate  Pulmonary:      Effort: Pulmonary effort is normal    Musculoskeletal:      Right lower leg: Edema present  Skin:     Capillary Refill: Capillary refill takes less than 2 seconds  Neurological:      General: No focal deficit present  Mental Status: He is alert and oriented to person, place, and time  Psychiatric:         Mood and Affect: Mood normal          Behavior: Behavior normal          Thought Content:  Thought content normal          Judgment: Judgment normal          Wound 08/11/20 Leg Right;Medial;Lower (Active)   Wound Image Images linked 09/08/20 7827 Wound Description Granulation tissue;Slough 09/08/20 1534   Desiree-wound Assessment Dry; Intact 09/08/20 1534   Wound Length (cm) 0 5 cm 09/08/20 1534   Wound Width (cm) 0 4 cm 09/08/20 1534   Wound Depth (cm) 0 2 cm 09/08/20 1534   Wound Surface Area (cm^2) 0 2 cm^2 09/08/20 1534   Wound Volume (cm^3) 0 04 cm^3 09/08/20 1534   Calculated Wound Volume (cm^3) 0 04 cm^3 09/08/20 1534   Change in Wound Size % 85 19 09/08/20 1534   Drainage Amount Moderate 09/08/20 1534   Drainage Description Serous 09/08/20 1534   Non-staged Wound Description Full thickness 09/08/20 1534       Debridement   Consent obtained? written  Consent given by: patient  Risks discussed? procedural risks discussed  Time out called at 9/8/2020 4:00 PM  Immediately prior to the procedure a time out was called  Performed by: physician  Debridement type: selective  Pain control: lidocaine 4%  Pre-debridement measurements  Length (cm): 0 5  Width (cm): 0 4  Depth (cm): 0 2  Surface Area (cm^2): 0 2  Volume (cm^3): 0 04    Post-debridement measurements  Length (cm): 0 5  Width (cm): 0 4  Depth (cm): 0 2  Percent debrided: 100%  Surface Area (cm^2): 0 2  Area debrided (cm^2): 0 2  Volume (cm^3): 0 04  Devitalized tissue debrided: biofilm and slough  Instrument(s) utilized: curette  Bleeding: small  Hemostasis obtained with: pressure  Procedural pain (0-10): 0  Post-procedural pain: 0   Response to treatment: procedure was tolerated well                   Wound Instructions:  Orders Placed This Encounter   Procedures    Wound cleansing and dressings     Right leg    Wash your hands with soap and water  Remove old dressing, discard into plastic bag and place in trash  Cleanse the wound with soap and water prior to applying a clean dressing  Do not use tissue or cotton balls  Do not scrub the wound  Pat dry using gauze  Shower yes with protection  Apply Endoform to the wound  Cover with gauze  Secure with Coflex TLC    Change dressing weekly with wrap change  This was applied today  Standing Status:   Future     Standing Expiration Date:   9/8/2021    Wound compression and edema control     Multi-layer compression wrap Instructions - right leg    Keep compression wrap/wraps clean and dry  If wraps are too tight and you experience numbness/tingling, call the wound center  If after hours, remove wraps or proceed to nearest E R  and call wound center in AM     Milena Santillano will be changed 1 x weekly    Avoid prolonged standing in one place  Elevate leg(s) above the level of the heart when sitting or as much as possible       Circ-aid - order for right leg     Standing Status:   Future     Standing Expiration Date:   9/8/2021         Carlos Mckeon, DO

## 2020-09-15 ENCOUNTER — OFFICE VISIT (OUTPATIENT)
Dept: WOUND CARE | Facility: HOSPITAL | Age: 80
End: 2020-09-15
Payer: COMMERCIAL

## 2020-09-15 VITALS
RESPIRATION RATE: 18 BRPM | TEMPERATURE: 96.3 F | HEART RATE: 67 BPM | SYSTOLIC BLOOD PRESSURE: 167 MMHG | DIASTOLIC BLOOD PRESSURE: 80 MMHG

## 2020-09-15 DIAGNOSIS — I87.331 CHRONIC VENOUS HYPERTENSION (IDIOPATHIC) WITH ULCER AND INFLAMMATION OF RIGHT LOWER EXTREMITY (CODE) (HCC): Primary | ICD-10-CM

## 2020-09-15 DIAGNOSIS — L97.318 NON-PRESSURE CHRONIC ULCER OF RIGHT ANKLE WITH OTHER SPECIFIED SEVERITY (HCC): ICD-10-CM

## 2020-09-15 DIAGNOSIS — I73.9 PERIPHERAL ARTERIAL DISEASE (HCC): ICD-10-CM

## 2020-09-15 PROCEDURE — G0463 HOSPITAL OUTPT CLINIC VISIT: HCPCS | Performed by: PREVENTIVE MEDICINE

## 2020-09-15 PROCEDURE — 97597 DBRDMT OPN WND 1ST 20 CM/<: CPT | Performed by: PREVENTIVE MEDICINE

## 2020-09-15 PROCEDURE — 99213 OFFICE O/P EST LOW 20 MIN: CPT | Performed by: PREVENTIVE MEDICINE

## 2020-09-15 RX ORDER — LIDOCAINE HYDROCHLORIDE 40 MG/ML
5 SOLUTION TOPICAL ONCE
Status: COMPLETED | OUTPATIENT
Start: 2020-09-15 | End: 2020-09-15

## 2020-09-15 RX ADMIN — LIDOCAINE HYDROCHLORIDE 5 ML: 40 SOLUTION TOPICAL at 16:00

## 2020-09-15 NOTE — PATIENT INSTRUCTIONS
No orders of the defined types were placed in this encounter  Orders Placed This Encounter   Procedures    Wound cleansing and dressings     Wound cleansing and dressings       Right leg     Wash your hands with soap and water  Remove old dressing, discard into plastic bag and place in trash  Cleanse the wound with soap and water prior to applying a clean dressing  Do not use tissue or cotton balls  Do not scrub the wound  Pat dry using gauze  Shower yes with protection  Apply dermagran to the wound  Cover with gauze  Secure with Coflex TLC lite  Change dressing weekly with wrap change      This was applied today          Wound compression and edema control      Multi-layer compression wrap Instructions - right leg     Keep compression wrap/wraps clean and dry  If wraps are too tight and you experience numbness/tingling, call the wound center   If after hours, remove wraps or proceed to nearest E R  and call wound center in AM      Wrap will be changed 1 x weekly     Avoid prolonged standing in one place      Elevate leg(s) above the level of the heart when sitting or as much as possible       Circ-aid - order for right leg          Standing Status:   Future     Standing Expiration Date:   9/15/2021

## 2020-09-16 NOTE — PROGRESS NOTES
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 Underlying PAD contributing to nonhealing, consider repeat LEAD  See orders    Subjective:   F/u R ankle  Pain with coflex wrap, not tolerating  Trouble sleeping  No fever  The following portions of the patient's history were reviewed and updated as appropriate: allergies, current medications, past family history, past medical history, past social history, past surgical history, and problem list     Review of Systems   Constitutional: Negative  Respiratory: Negative  Cardiovascular: Negative  Skin: Positive for wound  Allergic/Immunologic: Negative  Psychiatric/Behavioral: Negative  Objective:  /80   Pulse 67   Temp (!) 96 3 °F (35 7 °C) (Tympanic)   Resp 18     Physical Exam  Vitals signs reviewed  Constitutional:       General: He is not in acute distress  Appearance: Normal appearance  Cardiovascular:      Rate and Rhythm: Normal rate  Pulmonary:      Effort: Pulmonary effort is normal    Skin:     Capillary Refill: Capillary refill takes less than 2 seconds  Neurological:      General: No focal deficit present  Mental Status: He is alert and oriented to person, place, and time  Psychiatric:         Mood and Affect: Mood normal          Behavior: Behavior normal          Thought Content:  Thought content normal          Judgment: Judgment normal            Wound 08/01/19 Leg Right;Medial;Lower (Active)   Wound Image   09/15/20 1526   Wound Description Granulation tissue;Slough 09/15/20 1526   Desiree-wound Assessment Dry; Intact 09/15/20 1526   Wound Length (cm) 0 5 cm 09/15/20 1526   Wound Width (cm) 0 4 cm 09/15/20 1526   Wound Depth (cm) 0 2 cm 09/15/20 1526   Wound Surface Area (cm^2) 0 2 cm^2 09/15/20 1526   Wound Volume (cm^3) 0 04 cm^3 09/15/20 1526   Calculated Wound Volume (cm^3) 0 04 cm^3 09/15/20 1526   Change in Wound Size % 85 19 09/15/20 1526   Drainage Amount Moderate 09/15/20 1526   Drainage Description Serous 09/15/20 1526   Non-staged Wound Description Full thickness 09/15/20 1526   Treatments Cleansed 09/15/20 1526   Dressing Status Intact 09/01/20 1559                            Debridement   Consent obtained? written  Consent given by: patient  Risks discussed? procedural risks discussed  Time out called at 9/15/2020 3:30 PM    Performed by: physician  Debridement type: selective  Pain control: lidocaine 4%  Post-debridement measurements  Length (cm): 0 5  Width (cm): 0 4  Depth (cm): 0 2  Percent debrided: 100%  Surface Area (cm^2): 0 2  Area debrided (cm^2): 0 2  Volume (cm^3): 0 04  Devitalized tissue debrided: biofilm and slough  Instrument(s) utilized: curette  Bleeding: small  Hemostasis obtained with: pressure  Procedural pain (0-10): 2  Post-procedural pain: 0   Response to treatment: procedure was tolerated well                   Wound Instructions:  Orders Placed This Encounter   Procedures    Wound cleansing and dressings     Wound cleansing and dressings       Right leg     Wash your hands with soap and water  Remove old dressing, discard into plastic bag and place in trash  Cleanse the wound with soap and water prior to applying a clean dressing  Do not use tissue or cotton balls  Do not scrub the wound  Pat dry using gauze  Shower yes with protection  Apply dermagran to the wound    Cover with gauze  Secure with Coflex TLC lite  Change dressing weekly with wrap change      This was applied today          Wound compression and edema control      Multi-layer compression wrap Instructions - right leg     Keep compression wrap/wraps clean and dry  If wraps are too tight and you experience numbness/tingling, call the wound center  If after hours, remove wraps or proceed to nearest E R  and call wound center in AM      Wrap will be changed 1 x weekly     Avoid prolonged standing in one place      Elevate leg(s) above the level of the heart when sitting or as much as possible       Circ-aid - order for right leg          Standing Status:   Future     Standing Expiration Date:   9/15/2021         Edmundo Villalobos DO      Portions of the record may have been created with voice recognition software  Occasional wrong word or "sound a like" substitutions may have occurred due to the inherent limitations of voice recognition software  Read the chart carefully and recognize, using context, where substitutions have occurred

## 2020-09-22 ENCOUNTER — OFFICE VISIT (OUTPATIENT)
Dept: WOUND CARE | Facility: HOSPITAL | Age: 80
End: 2020-09-22
Payer: COMMERCIAL

## 2020-09-22 VITALS
HEART RATE: 64 BPM | DIASTOLIC BLOOD PRESSURE: 75 MMHG | TEMPERATURE: 97.5 F | SYSTOLIC BLOOD PRESSURE: 154 MMHG | RESPIRATION RATE: 20 BRPM

## 2020-09-22 DIAGNOSIS — I73.9 PERIPHERAL ARTERIAL DISEASE (HCC): ICD-10-CM

## 2020-09-22 DIAGNOSIS — I87.331 CHRONIC VENOUS HYPERTENSION (IDIOPATHIC) WITH ULCER AND INFLAMMATION OF RIGHT LOWER EXTREMITY (CODE) (HCC): Primary | ICD-10-CM

## 2020-09-22 DIAGNOSIS — L97.318 NON-PRESSURE CHRONIC ULCER OF RIGHT ANKLE WITH OTHER SPECIFIED SEVERITY (HCC): ICD-10-CM

## 2020-09-22 PROCEDURE — 97597 DBRDMT OPN WND 1ST 20 CM/<: CPT | Performed by: PREVENTIVE MEDICINE

## 2020-09-22 PROCEDURE — NC001 PR NO CHARGE: Performed by: PREVENTIVE MEDICINE

## 2020-09-22 RX ORDER — LIDOCAINE HYDROCHLORIDE 40 MG/ML
5 SOLUTION TOPICAL ONCE
Status: COMPLETED | OUTPATIENT
Start: 2020-09-22 | End: 2020-09-22

## 2020-09-22 RX ADMIN — LIDOCAINE HYDROCHLORIDE 5 ML: 40 SOLUTION TOPICAL at 15:50

## 2020-09-22 NOTE — PROGRESS NOTES
Cast Application    Date/Time: 9/22/2020 3:56 PM  Performed by: Ra Amos RN  Authorized by: Minerva Hope DO     Consent:     Consent obtained:  Verbal    Consent given by:  Patient    Risks discussed:  Discoloration, numbness, pain and swelling  Pre-procedure details:     Sensation:  Normal  Procedure details:     Laterality:  Right    Location:  Leg    Leg:  R lower legCast type:  Multi-layer compression short leg    Supplies:  2 layer wrap (coflex lite)  Post-procedure details:     Pain:  Unchanged    Sensation:  Normal    Patient tolerance of procedure: Tolerated well, no immediate complications  Comments:      Coflex Lite Multiplayer wrap procedure     Before application, JANICE and/or TBI determined to be adequate for healing and application of compression  Lower extremity washed prior to application of compression wrap  With the foot in dorsiflexed position, coflex lite was applied as per physician orders without complications or complaint of pain  The procedure was tolerated well  Toes warm & pink post application    Patient provided education & reinforced to observe toes for any discoloration, swelling or tingling and instructed when to report to the [address] [facility],[address] or to remove compression

## 2020-09-22 NOTE — PATIENT INSTRUCTIONS
Orders Placed This Encounter   Procedures    Wound cleansing and dressings     Wound cleansing and dressings                                  Right leg     Wash your hands with soap and water  Remove old dressing, discard into plastic bag and place in trash  Cleanse the wound with soap and water prior to applying a clean dressing  Do not use tissue or cotton balls  Do not scrub the wound  Pat dry using gauze  Shower yes with protection  Apply dermagran to the wound  Cover with gauze  Secure with Coflex TLC lite  Change dressing weekly with wrap change      This was applied today  Standing Status:   Future     Standing Expiration Date:   9/22/2021    Wound compression and edema control     Multi-layer compression wrap Instructions - right leg     Keep compression wrap/wraps clean and dry  If wraps are too tight and you experience numbness/tingling, call the wound center   If after hours, remove wraps or proceed to nearest E R  and call wound center in AM      Wrap will be changed 1 x weekly     Avoid prolonged standing in one place      Elevate leg(s) above the level of the heart when sitting or as much as possible       Circ-aid - order for right leg     Standing Status:   Future     Standing Expiration Date:   9/22/2021    Wound miscellaneous orders     Bring Circ-aid with you next visit     Standing Status:   Future     Standing Expiration Date:   9/03/2717    Cast Application     This order was created via procedure documentation

## 2020-09-22 NOTE — PROGRESS NOTES
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 ([facility] [address] )    3  Peripheral arterial disease ([facility] [address] )        Assessment & Plan:  Improved  Continue current care    Subjective:   F/u R ankle  No new complaints  Tolerating coflex lite compression  No fever        The following portions of the patient's history were reviewed and updated as appropriate: allergies, current medications, past family history, past medical history, past social history, past surgical history, and problem list     Review of Systems   Constitutional: Negative  Respiratory: Negative  Cardiovascular: Negative  Skin: Positive for wound  Allergic/Immunologic: Negative  Psychiatric/Behavioral: Negative  Objective:  /75   Pulse 64   Temp 97 5 °F (36 4 °C)   Resp 20     Physical Exam  Vitals signs reviewed  Constitutional:       General: He is not in acute distress  Appearance: Normal appearance  Cardiovascular:      Rate and Rhythm: Normal rate  Pulmonary:      Effort: Pulmonary effort is normal    Musculoskeletal:      Right lower leg: No edema  Skin:     Capillary Refill: Capillary refill takes less than 2 seconds  Neurological:      General: No focal deficit present  Mental Status: He is alert and oriented to person, place, and time  Psychiatric:         Mood and Affect: Mood normal          Behavior: Behavior normal          Thought Content:  Thought content normal          Judgment: Judgment normal          Wound 08/01/19 Leg Right;Medial;Lower (Active)   Wound Image Images linked 09/22/20 1548   Wound Description Pink;Granulation tissue 09/22/20 1548   Desiree-wound Assessment Dry; Intact 09/22/20 1548   Wound Length (cm) 0 3 cm 09/22/20 1548   Wound Width (cm) 0 2 cm 09/22/20 1548   Wound Depth (cm) 0 1 cm 09/22/20 1548   Wound Surface Area (cm^2) 0 06 cm^2 09/22/20 1548   Wound Volume (cm^3) 0 01 cm^3 09/22/20 1548   Calculated Wound Volume (cm^3) 0 01 cm^3 09/22/20 1548   Change in Wound Size % 96 3 09/22/20 1548   Drainage Amount Small 09/22/20 1548   Drainage Description Serosanguineous 09/22/20 1548   Non-staged Wound Description Full thickness 09/22/20 1548   Dressing Status Intact (Coflex intact) 09/22/20 1548       Debridement   Wound 08/01/19 Leg Right;Medial;Lower    Consent obtained? written  Consent given by: patient  Risks discussed? procedural risks discussed  Time out called at 9/22/2020 4:08 PM  Immediately prior to the procedure a time out was called  Performed by: physician  Debridement type: selective  Pain control: lidocaine 4%  Post-debridement measurements  Length (cm): 0 3  Width (cm): 0 2  Depth (cm): 0 1  Percent debrided: 100%  Surface Area (cm^2): 0 06  Area debrided (cm^2): 0 06  Volume (cm^3): 0 01  Devitalized tissue debrided: biofilm and slough  Instrument(s) utilized: curette  Bleeding: small  Hemostasis obtained with: pressure  Procedural pain (0-10): 0  Post-procedural pain: 0   Response to treatment: procedure was tolerated well                   Wound Instructions:  Orders Placed This Encounter   Procedures    Wound cleansing and dressings     Wound cleansing and dressings                                  Right leg     Wash your hands with soap and water  Remove old dressing, discard into plastic bag and place in trash  Cleanse the wound with soap and water prior to applying a clean dressing   Do not use tissue or cotton balls  Do not scrub the wound  Pat dry using gauze  Shower yes with protection  Apply dermagran to the wound  Cover with gauze  Secure with Coflex TLC lite  Change dressing weekly with wrap change      This was applied today  Standing Status:   Future     Standing Expiration Date:   9/22/2021    Wound compression and edema control     Multi-layer compression wrap Instructions - right leg     Keep compression wrap/wraps clean and dry  If wraps are too tight and you experience numbness/tingling, call the wound center  If after hours, remove wraps or proceed to nearest E R  and call wound center in AM      Wrap will be changed 1 x weekly     Avoid prolonged standing in one place      Elevate leg(s) above the level of the heart when sitting or as much as possible       Circ-aid - order for right leg     Standing Status:   Future     Standing Expiration Date:   9/22/2021    Wound miscellaneous orders     Bring Circ-aid with you next visit     Standing Status:   Future     Standing Expiration Date:   7/04/9957    Cast Application     This order was created via procedure documentation         Lawerance All, DO      Portions of the record may have been created with voice recognition software  Occasional wrong word or "sound a like" substitutions may have occurred due to the inherent limitations of voice recognition software  Read the chart carefully and recognize, using context, where substitutions have occurred

## 2020-09-29 ENCOUNTER — OFFICE VISIT (OUTPATIENT)
Dept: WOUND CARE | Facility: HOSPITAL | Age: 80
End: 2020-09-29
Payer: COMMERCIAL

## 2020-09-29 VITALS
TEMPERATURE: 96.9 F | SYSTOLIC BLOOD PRESSURE: 110 MMHG | RESPIRATION RATE: 16 BRPM | HEART RATE: 80 BPM | DIASTOLIC BLOOD PRESSURE: 58 MMHG

## 2020-09-29 DIAGNOSIS — I87.331 CHRONIC VENOUS HYPERTENSION (IDIOPATHIC) WITH ULCER AND INFLAMMATION OF RIGHT LOWER EXTREMITY (CODE) (HCC): Primary | ICD-10-CM

## 2020-09-29 DIAGNOSIS — I73.9 PERIPHERAL ARTERIAL DISEASE (HCC): ICD-10-CM

## 2020-09-29 DIAGNOSIS — L97.318 NON-PRESSURE CHRONIC ULCER OF RIGHT ANKLE WITH OTHER SPECIFIED SEVERITY (HCC): ICD-10-CM

## 2020-09-29 PROCEDURE — 99213 OFFICE O/P EST LOW 20 MIN: CPT | Performed by: PREVENTIVE MEDICINE

## 2020-09-29 PROCEDURE — 29581 APPL MULTLAYER CMPRN SYS LEG: CPT

## 2020-09-29 RX ORDER — LIDOCAINE HYDROCHLORIDE 40 MG/ML
5 SOLUTION TOPICAL ONCE
Status: COMPLETED | OUTPATIENT
Start: 2020-09-29 | End: 2020-09-29

## 2020-09-29 RX ADMIN — LIDOCAINE HYDROCHLORIDE 5 ML: 40 SOLUTION TOPICAL at 15:10

## 2020-09-29 NOTE — PROGRESS NOTES
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 ([address] )    3  Peripheral arterial disease ([facility] [address] )        Assessment & Plan:  Almost healed  Continue current care    Subjective:   F/u RLE  No new complaints        The following portions of the patient's history were reviewed and updated as appropriate: allergies, current medications, past family history, past medical history, past social history, past surgical history, and problem list     Review of Systems   Constitutional: Negative  Respiratory: Negative  Cardiovascular: Negative  Skin: Positive for wound  Allergic/Immunologic: Negative  Psychiatric/Behavioral: Negative  Objective:  /58   Pulse 80   Temp (!) 96 9 °F (36 1 °C)   Resp 16     Physical Exam  Vitals signs reviewed  Constitutional:       General: He is not in acute distress  Appearance: Normal appearance  Cardiovascular:      Rate and Rhythm: Normal rate  Pulmonary:      Effort: Pulmonary effort is normal    Musculoskeletal:      Right lower leg: No edema  Skin:     Capillary Refill: Capillary refill takes less than 2 seconds  Neurological:      General: No focal deficit present  Mental Status: He is alert and oriented to person, place, and time  Psychiatric:         Mood and Affect: Mood normal          Behavior: Behavior normal          Thought Content:  Thought content normal          Judgment: Judgment normal               Procedures             Wound Instructions:  Orders Placed This Encounter   Procedures    Wound cleansing and dressings     Right leg   Wash your hands with soap and water  Remove old dressing, discard into plastic bag and place in trash  Cleanse the wound with soap and water prior to applying a clean dressing  Do not use tissue or cotton balls  Do not scrub the wound  Pat dry using gauze  Shower yes with protection  Apply Puracol to the wound  Cover with gauze  Secure with Coflex TLC lite  Change dressing weekly with wrap change  UNNA BOOT and Multiplayer wrap procedure     Before application, JANICE and/or TBI determined to be adequate for healing and application of compression  Lower extremity washed prior to application of compression wrap  With the foot in dorsiflexed position, Coflex Lite was applied as per physician orders without complications or complaint of pain  The procedure was tolerated well  Toes warm & pink post application  Patient provided education & reinforced to observe toes for any discoloration, swelling or tingling and instructed when to report to the [address] [facility] or to remove compressionUnna Boot/ Multi-layer compression wrap Instructions    Keep compression wrap/wraps clean and dry  If wraps are too tight and you experience numbness/tingling, call the wound center  If after hours, remove wraps or proceed to nearest E R  and call wound center in AM     [place] A[place] will be changed 1 x weekly    Avoid prolonged standing in one place  Elevate leg(s) above the level of the heart when sitting or as much as possible  Treatments above were completed today at the [facility]     Standing Status:   Future     Standing Expiration Date:   4/83/7805    Cast Application     This order was created via procedure documentation         Liliam Neves DO      Portions of the record may have been created with voice recognition software  Occasional wrong word or "sound a like" substitutions may have occurred due to the inherent limitations of voice recognition software  Read the chart carefully and recognize, using context, where substitutions have occurred

## 2020-09-29 NOTE — PATIENT INSTRUCTIONS
Orders Placed This Encounter   Procedures    Wound cleansing and dressings     Right leg   Wash your hands with soap and water  Remove old dressing, discard into plastic bag and place in trash  Cleanse the wound with soap and water prior to applying a clean dressing  Do not use tissue or cotton balls  Do not scrub the wound  Pat dry using gauze  Shower yes with protection  Apply Puracol to the wound  Cover with gauze  Secure with Coflex TLC lite  Change dressing weekly with wrap change  UNNA BOOT and Multiplayer wrap procedure     Before application, JANICE and/or TBI determined to be adequate for healing and application of compression  Lower extremity washed prior to application of compression wrap  With the foot in dorsiflexed position, Coflex Lite was applied as per physician orders without complications or complaint of pain  The procedure was tolerated well  Toes warm & pink post application  Patient provided education & reinforced to observe toes for any discoloration, swelling or tingling and instructed when to report to the [address] [facility] or to remove compressionUnna Boot/ Multi-layer compression wrap Instructions    Keep compression wrap/wraps clean and dry  If wraps are too tight and you experience numbness/tingling, call the wound center  If after hours, remove wraps or proceed to nearest E R  and call wound center in AM     [place]mer[place] will be changed 1 x weekly    Avoid prolonged standing in one place  Elevate leg(s) above the level of the heart when sitting or as much as possible       Treatments above were completed today at the [facility]     Standing Status:   Future     Standing Expiration Date:   9/29/2021

## 2020-09-29 NOTE — PROGRESS NOTES
Cast Application    Date/Time: 9/29/2020 3:59 PM  Performed by: Kirt Salcido RN  Authorized by: Iris Munguia DO     Consent:     Consent obtained:  Verbal and written    Consent given by:  Patient    Risks discussed:  Discoloration, numbness, pain and swelling    Alternatives discussed:  Delayed treatment  Pre-procedure details:     Sensation:  Normal  Procedure details:     Laterality:  Right    Location:  Leg    Leg:  R lower legCast type:  Multi-layer compression short leg    Supplies:  2 layer wrap  Post-procedure details:     Pain:  Unchanged    Sensation:  Normal    Patient tolerance of procedure:   Tolerated well, no immediate complications

## 2020-10-06 ENCOUNTER — OFFICE VISIT (OUTPATIENT)
Dept: WOUND CARE | Facility: HOSPITAL | Age: 80
End: 2020-10-06
Payer: COMMERCIAL

## 2020-10-06 VITALS
TEMPERATURE: 96.5 F | HEART RATE: 69 BPM | DIASTOLIC BLOOD PRESSURE: 67 MMHG | RESPIRATION RATE: 16 BRPM | SYSTOLIC BLOOD PRESSURE: 159 MMHG

## 2020-10-06 DIAGNOSIS — I73.9 PERIPHERAL ARTERIAL DISEASE (HCC): ICD-10-CM

## 2020-10-06 DIAGNOSIS — I87.331 CHRONIC VENOUS HYPERTENSION (IDIOPATHIC) WITH ULCER AND INFLAMMATION OF RIGHT LOWER EXTREMITY (CODE) (HCC): Primary | ICD-10-CM

## 2020-10-06 DIAGNOSIS — L97.318 NON-PRESSURE CHRONIC ULCER OF RIGHT ANKLE WITH OTHER SPECIFIED SEVERITY (HCC): ICD-10-CM

## 2020-10-06 PROCEDURE — 99213 OFFICE O/P EST LOW 20 MIN: CPT | Performed by: PREVENTIVE MEDICINE

## 2020-10-06 PROCEDURE — G0463 HOSPITAL OUTPT CLINIC VISIT: HCPCS | Performed by: PREVENTIVE MEDICINE

## 2020-10-13 ENCOUNTER — OFFICE VISIT (OUTPATIENT)
Dept: WOUND CARE | Facility: HOSPITAL | Age: 80
End: 2020-10-13
Payer: COMMERCIAL

## 2020-10-13 VITALS
SYSTOLIC BLOOD PRESSURE: 102 MMHG | TEMPERATURE: 98.3 F | HEART RATE: 74 BPM | RESPIRATION RATE: 16 BRPM | DIASTOLIC BLOOD PRESSURE: 58 MMHG

## 2020-10-13 DIAGNOSIS — I87.331 CHRONIC VENOUS HYPERTENSION (IDIOPATHIC) WITH ULCER AND INFLAMMATION OF RIGHT LOWER EXTREMITY (CODE) (HCC): Primary | ICD-10-CM

## 2020-10-13 DIAGNOSIS — I73.9 PERIPHERAL ARTERIAL DISEASE (HCC): ICD-10-CM

## 2020-10-13 DIAGNOSIS — L97.318 NON-PRESSURE CHRONIC ULCER OF RIGHT ANKLE WITH OTHER SPECIFIED SEVERITY (HCC): ICD-10-CM

## 2020-10-13 PROCEDURE — 97597 DBRDMT OPN WND 1ST 20 CM/<: CPT | Performed by: PREVENTIVE MEDICINE

## 2020-10-13 RX ORDER — LIDOCAINE HYDROCHLORIDE 40 MG/ML
5 SOLUTION TOPICAL ONCE
Status: COMPLETED | OUTPATIENT
Start: 2020-10-13 | End: 2020-10-13

## 2020-10-13 RX ADMIN — LIDOCAINE HYDROCHLORIDE 5 ML: 40 SOLUTION TOPICAL at 15:31

## 2020-10-20 ENCOUNTER — TELEPHONE (OUTPATIENT)
Dept: ADMINISTRATIVE | Facility: HOSPITAL | Age: 80
End: 2020-10-20

## 2020-10-20 ENCOUNTER — HOSPITAL ENCOUNTER (OUTPATIENT)
Dept: NON INVASIVE DIAGNOSTICS | Facility: CLINIC | Age: 80
Discharge: HOME | End: 2020-10-20
Payer: COMMERCIAL

## 2020-10-20 ENCOUNTER — OFFICE VISIT (OUTPATIENT)
Dept: WOUND CARE | Facility: HOSPITAL | Age: 80
End: 2020-10-20
Payer: COMMERCIAL

## 2020-10-20 VITALS
HEART RATE: 76 BPM | RESPIRATION RATE: 16 BRPM | DIASTOLIC BLOOD PRESSURE: 70 MMHG | SYSTOLIC BLOOD PRESSURE: 189 MMHG | TEMPERATURE: 96.4 F

## 2020-10-20 DIAGNOSIS — I87.331 CHRONIC VENOUS HYPERTENSION (IDIOPATHIC) WITH ULCER AND INFLAMMATION OF RIGHT LOWER EXTREMITY (CODE) (HCC): Primary | ICD-10-CM

## 2020-10-20 DIAGNOSIS — L97.318 NON-PRESSURE CHRONIC ULCER OF RIGHT ANKLE WITH OTHER SPECIFIED SEVERITY (HCC): ICD-10-CM

## 2020-10-20 DIAGNOSIS — I73.9 PERIPHERAL ARTERIAL DISEASE (HCC): ICD-10-CM

## 2020-10-20 DIAGNOSIS — I87.2 CHRONIC VENOUS INSUFFICIENCY: ICD-10-CM

## 2020-10-20 PROCEDURE — G0463 HOSPITAL OUTPT CLINIC VISIT: HCPCS | Performed by: PREVENTIVE MEDICINE

## 2020-10-20 PROCEDURE — 93971 EXTREMITY STUDY: CPT

## 2020-10-20 PROCEDURE — 99212 OFFICE O/P EST SF 10 MIN: CPT | Performed by: PREVENTIVE MEDICINE

## 2020-10-20 PROCEDURE — 99213 OFFICE O/P EST LOW 20 MIN: CPT | Performed by: PREVENTIVE MEDICINE

## 2020-10-20 PROCEDURE — 93971 EXTREMITY STUDY: CPT | Performed by: SURGERY

## 2020-10-20 RX ORDER — LIDOCAINE HYDROCHLORIDE 40 MG/ML
5 SOLUTION TOPICAL ONCE
Status: COMPLETED | OUTPATIENT
Start: 2020-10-20 | End: 2020-10-20

## 2020-10-20 RX ADMIN — LIDOCAINE HYDROCHLORIDE 5 ML: 40 SOLUTION TOPICAL at 13:52

## 2020-10-21 ENCOUNTER — OFFICE VISIT (OUTPATIENT)
Dept: VASCULAR SURGERY | Facility: CLINIC | Age: 80
End: 2020-10-21
Payer: COMMERCIAL

## 2020-10-21 VITALS
RESPIRATION RATE: 16 BRPM | SYSTOLIC BLOOD PRESSURE: 114 MMHG | HEART RATE: 70 BPM | WEIGHT: 163 LBS | BODY MASS INDEX: 26.2 KG/M2 | DIASTOLIC BLOOD PRESSURE: 62 MMHG | TEMPERATURE: 98.4 F | HEIGHT: 66 IN

## 2020-10-21 DIAGNOSIS — I73.9 PERIPHERAL ARTERIAL DISEASE (HCC): Primary | ICD-10-CM

## 2020-10-21 DIAGNOSIS — E78.00 HYPERCHOLESTEROLEMIA: ICD-10-CM

## 2020-10-21 DIAGNOSIS — I65.23 ASYMPTOMATIC BILATERAL CAROTID ARTERY STENOSIS: ICD-10-CM

## 2020-10-21 DIAGNOSIS — C61 PROSTATE CANCER (HCC): ICD-10-CM

## 2020-10-21 DIAGNOSIS — I87.2 CHRONIC VENOUS INSUFFICIENCY: ICD-10-CM

## 2020-10-21 DIAGNOSIS — I10 BENIGN ESSENTIAL HYPERTENSION: ICD-10-CM

## 2020-10-21 PROCEDURE — 1160F RVW MEDS BY RX/DR IN RCRD: CPT | Performed by: SURGERY

## 2020-10-21 PROCEDURE — 99214 OFFICE O/P EST MOD 30 MIN: CPT | Performed by: SURGERY

## 2020-10-21 PROCEDURE — 1036F TOBACCO NON-USER: CPT | Performed by: SURGERY

## 2020-10-26 ENCOUNTER — HOSPITAL ENCOUNTER (OUTPATIENT)
Dept: NON INVASIVE DIAGNOSTICS | Facility: HOSPITAL | Age: 80
Discharge: HOME | End: 2020-10-26
Attending: SURGERY
Payer: COMMERCIAL

## 2020-10-26 DIAGNOSIS — I73.9 PERIPHERAL ARTERIAL DISEASE (HCC): ICD-10-CM

## 2020-10-26 PROCEDURE — 93926 LOWER EXTREMITY STUDY: CPT

## 2020-10-26 PROCEDURE — 93922 UPR/L XTREMITY ART 2 LEVELS: CPT | Performed by: SURGERY

## 2020-10-26 PROCEDURE — 93926 LOWER EXTREMITY STUDY: CPT | Performed by: SURGERY

## 2020-10-27 ENCOUNTER — OFFICE VISIT (OUTPATIENT)
Dept: WOUND CARE | Facility: HOSPITAL | Age: 80
End: 2020-10-27
Payer: COMMERCIAL

## 2020-10-27 VITALS
DIASTOLIC BLOOD PRESSURE: 62 MMHG | TEMPERATURE: 97.2 F | HEART RATE: 64 BPM | SYSTOLIC BLOOD PRESSURE: 112 MMHG | RESPIRATION RATE: 16 BRPM

## 2020-10-27 DIAGNOSIS — I73.9 PERIPHERAL ARTERIAL DISEASE (HCC): ICD-10-CM

## 2020-10-27 DIAGNOSIS — I87.331 CHRONIC VENOUS HYPERTENSION (IDIOPATHIC) WITH ULCER AND INFLAMMATION OF RIGHT LOWER EXTREMITY (CODE) (HCC): Primary | ICD-10-CM

## 2020-10-27 DIAGNOSIS — L97.318 NON-PRESSURE CHRONIC ULCER OF RIGHT ANKLE WITH OTHER SPECIFIED SEVERITY (HCC): ICD-10-CM

## 2020-10-27 PROCEDURE — G0463 HOSPITAL OUTPT CLINIC VISIT: HCPCS | Performed by: PREVENTIVE MEDICINE

## 2020-10-27 PROCEDURE — 99213 OFFICE O/P EST LOW 20 MIN: CPT | Performed by: PREVENTIVE MEDICINE

## 2020-10-27 RX ORDER — LIDOCAINE HYDROCHLORIDE 40 MG/ML
5 SOLUTION TOPICAL ONCE
Status: COMPLETED | OUTPATIENT
Start: 2020-10-27 | End: 2020-10-27

## 2020-10-27 RX ADMIN — LIDOCAINE HYDROCHLORIDE 5 ML: 40 SOLUTION TOPICAL at 15:44

## 2020-10-28 ENCOUNTER — OFFICE VISIT (OUTPATIENT)
Dept: VASCULAR SURGERY | Facility: CLINIC | Age: 80
End: 2020-10-28
Payer: COMMERCIAL

## 2020-10-28 ENCOUNTER — TELEPHONE (OUTPATIENT)
Dept: VASCULAR SURGERY | Facility: CLINIC | Age: 80
End: 2020-10-28

## 2020-10-28 VITALS
SYSTOLIC BLOOD PRESSURE: 142 MMHG | HEIGHT: 66 IN | WEIGHT: 163 LBS | BODY MASS INDEX: 26.2 KG/M2 | HEART RATE: 64 BPM | DIASTOLIC BLOOD PRESSURE: 84 MMHG | TEMPERATURE: 98 F

## 2020-10-28 DIAGNOSIS — I87.2 CHRONIC VENOUS INSUFFICIENCY: ICD-10-CM

## 2020-10-28 DIAGNOSIS — I10 BENIGN ESSENTIAL HYPERTENSION: ICD-10-CM

## 2020-10-28 DIAGNOSIS — I65.23 ASYMPTOMATIC BILATERAL CAROTID ARTERY STENOSIS: ICD-10-CM

## 2020-10-28 DIAGNOSIS — I73.9 PERIPHERAL ARTERIAL DISEASE (HCC): Primary | ICD-10-CM

## 2020-10-28 DIAGNOSIS — C61 PROSTATE CANCER (HCC): ICD-10-CM

## 2020-10-28 DIAGNOSIS — K40.30 INGUINAL HERNIA OF RIGHT SIDE WITH OBSTRUCTION: ICD-10-CM

## 2020-10-28 DIAGNOSIS — E78.00 HYPERCHOLESTEROLEMIA: ICD-10-CM

## 2020-10-28 PROCEDURE — 1160F RVW MEDS BY RX/DR IN RCRD: CPT | Performed by: SURGERY

## 2020-10-28 PROCEDURE — 99214 OFFICE O/P EST MOD 30 MIN: CPT | Performed by: SURGERY

## 2020-10-28 RX ORDER — SODIUM CHLORIDE 9 MG/ML
75 INJECTION, SOLUTION INTRAVENOUS CONTINUOUS
Status: CANCELLED | OUTPATIENT
Start: 2020-10-28

## 2020-11-05 ENCOUNTER — LAB (OUTPATIENT)
Dept: LAB | Facility: HOSPITAL | Age: 80
End: 2020-11-05
Attending: SURGERY
Payer: COMMERCIAL

## 2020-11-05 ENCOUNTER — TRANSCRIBE ORDERS (OUTPATIENT)
Dept: LAB | Facility: HOSPITAL | Age: 80
End: 2020-11-05

## 2020-11-05 DIAGNOSIS — I73.9 PERIPHERAL ARTERIAL DISEASE (HCC): ICD-10-CM

## 2020-11-05 LAB
ANION GAP SERPL CALCULATED.3IONS-SCNC: 3 MMOL/L (ref 4–13)
APTT PPP: 27 SECONDS (ref 23–37)
BASOPHILS # BLD AUTO: 0.08 THOUSANDS/ΜL (ref 0–0.1)
BASOPHILS NFR BLD AUTO: 1 % (ref 0–1)
BUN SERPL-MCNC: 31 MG/DL (ref 5–25)
CALCIUM SERPL-MCNC: 9 MG/DL (ref 8.3–10.1)
CHLORIDE SERPL-SCNC: 107 MMOL/L (ref 100–108)
CO2 SERPL-SCNC: 30 MMOL/L (ref 21–32)
CREAT SERPL-MCNC: 1.18 MG/DL (ref 0.6–1.3)
EOSINOPHIL # BLD AUTO: 0.78 THOUSAND/ΜL (ref 0–0.61)
EOSINOPHIL NFR BLD AUTO: 7 % (ref 0–6)
ERYTHROCYTE [DISTWIDTH] IN BLOOD BY AUTOMATED COUNT: 14.1 % (ref 11.6–15.1)
GFR SERPL CREATININE-BSD FRML MDRD: 58 ML/MIN/1.73SQ M
GLUCOSE P FAST SERPL-MCNC: 98 MG/DL (ref 65–99)
HCT VFR BLD AUTO: 48 % (ref 36.5–49.3)
HGB BLD-MCNC: 14.9 G/DL (ref 12–17)
IMM GRANULOCYTES # BLD AUTO: 0.13 THOUSAND/UL (ref 0–0.2)
IMM GRANULOCYTES NFR BLD AUTO: 1 % (ref 0–2)
LYMPHOCYTES # BLD AUTO: 2.84 THOUSANDS/ΜL (ref 0.6–4.47)
LYMPHOCYTES NFR BLD AUTO: 24 % (ref 14–44)
MCH RBC QN AUTO: 29.5 PG (ref 26.8–34.3)
MCHC RBC AUTO-ENTMCNC: 31 G/DL (ref 31.4–37.4)
MCV RBC AUTO: 95 FL (ref 82–98)
MONOCYTES # BLD AUTO: 0.99 THOUSAND/ΜL (ref 0.17–1.22)
MONOCYTES NFR BLD AUTO: 8 % (ref 4–12)
NEUTROPHILS # BLD AUTO: 7.09 THOUSANDS/ΜL (ref 1.85–7.62)
NEUTS SEG NFR BLD AUTO: 59 % (ref 43–75)
NRBC BLD AUTO-RTO: 0 /100 WBCS
PLATELET # BLD AUTO: 189 THOUSANDS/UL (ref 149–390)
PMV BLD AUTO: 10.5 FL (ref 8.9–12.7)
POTASSIUM SERPL-SCNC: 4.2 MMOL/L (ref 3.5–5.3)
RBC # BLD AUTO: 5.05 MILLION/UL (ref 3.88–5.62)
SODIUM SERPL-SCNC: 140 MMOL/L (ref 136–145)
WBC # BLD AUTO: 11.91 THOUSAND/UL (ref 4.31–10.16)

## 2020-11-05 PROCEDURE — 36415 COLL VENOUS BLD VENIPUNCTURE: CPT

## 2020-11-05 PROCEDURE — 80048 BASIC METABOLIC PNL TOTAL CA: CPT

## 2020-11-05 PROCEDURE — 85025 COMPLETE CBC W/AUTO DIFF WBC: CPT

## 2020-11-05 PROCEDURE — 85730 THROMBOPLASTIN TIME PARTIAL: CPT

## 2020-11-12 DIAGNOSIS — Z11.59 SCREENING FOR VIRAL DISEASE: ICD-10-CM

## 2020-11-12 PROCEDURE — U0003 INFECTIOUS AGENT DETECTION BY NUCLEIC ACID (DNA OR RNA); SEVERE ACUTE RESPIRATORY SYNDROME CORONAVIRUS 2 (SARS-COV-2) (CORONAVIRUS DISEASE [COVID-19]), AMPLIFIED PROBE TECHNIQUE, MAKING USE OF HIGH THROUGHPUT TECHNOLOGIES AS DESCRIBED BY CMS-2020-01-R: HCPCS | Performed by: SURGERY

## 2020-11-13 LAB — SARS-COV-2 RNA SPEC QL NAA+PROBE: NOT DETECTED

## 2020-11-15 ENCOUNTER — ANESTHESIA EVENT (OUTPATIENT)
Dept: PERIOP | Facility: HOSPITAL | Age: 80
End: 2020-11-15
Payer: COMMERCIAL

## 2020-11-16 ENCOUNTER — ANESTHESIA (OUTPATIENT)
Dept: PERIOP | Facility: HOSPITAL | Age: 80
End: 2020-11-16
Payer: COMMERCIAL

## 2020-11-16 ENCOUNTER — HOSPITAL ENCOUNTER (OUTPATIENT)
Facility: HOSPITAL | Age: 80
Setting detail: OUTPATIENT SURGERY
Discharge: HOME | End: 2020-11-16
Attending: SURGERY | Admitting: SURGERY
Payer: COMMERCIAL

## 2020-11-16 ENCOUNTER — APPOINTMENT (OUTPATIENT)
Dept: RADIOLOGY | Facility: HOSPITAL | Age: 80
End: 2020-11-16
Payer: COMMERCIAL

## 2020-11-16 VITALS
DIASTOLIC BLOOD PRESSURE: 78 MMHG | TEMPERATURE: 97.5 F | HEIGHT: 66 IN | RESPIRATION RATE: 14 BRPM | HEART RATE: 78 BPM | SYSTOLIC BLOOD PRESSURE: 148 MMHG | OXYGEN SATURATION: 97 % | BODY MASS INDEX: 26.2 KG/M2 | WEIGHT: 163 LBS

## 2020-11-16 VITALS — HEART RATE: 71 BPM

## 2020-11-16 DIAGNOSIS — I73.9 PERIPHERAL ARTERIAL DISEASE (HCC): ICD-10-CM

## 2020-11-16 DIAGNOSIS — I65.23 ASYMPTOMATIC BILATERAL CAROTID ARTERY STENOSIS: ICD-10-CM

## 2020-11-16 DIAGNOSIS — Z11.59 SCREENING FOR VIRAL DISEASE: Primary | ICD-10-CM

## 2020-11-16 LAB
BASE EXCESS BLDA CALC-SCNC: 1 MMOL/L (ref -2–3)
CA-I BLD-SCNC: 1.14 MMOL/L (ref 1.12–1.32)
GLUCOSE SERPL-MCNC: 103 MG/DL (ref 65–140)
HCO3 BLDA-SCNC: 25.2 MMOL/L (ref 22–28)
HCT VFR BLD CALC: 37 % (ref 36.5–49.3)
HGB BLDA-MCNC: 12.6 G/DL (ref 12–17)
KCT BLD-ACNC: 219 SEC (ref 89–137)
KCT BLD-ACNC: 243 SEC (ref 89–137)
PCO2 BLD: 26 MMOL/L (ref 21–32)
PCO2 BLD: 39.4 MM HG (ref 36–44)
PH BLD: 7.41 [PH] (ref 7.35–7.45)
PO2 BLD: 267 MM HG (ref 75–129)
POTASSIUM BLD-SCNC: 4 MMOL/L (ref 3.5–5.3)
SAO2 % BLD FROM PO2: 100 % (ref 60–85)
SODIUM BLD-SCNC: 139 MMOL/L (ref 136–145)
SPECIMEN SOURCE: ABNORMAL

## 2020-11-16 PROCEDURE — NC001 PR NO CHARGE: Performed by: SURGERY

## 2020-11-16 PROCEDURE — C1769 GUIDE WIRE: HCPCS | Performed by: SURGERY

## 2020-11-16 PROCEDURE — 84295 ASSAY OF SERUM SODIUM: CPT

## 2020-11-16 PROCEDURE — C1887 CATHETER, GUIDING: HCPCS | Performed by: SURGERY

## 2020-11-16 PROCEDURE — 82947 ASSAY GLUCOSE BLOOD QUANT: CPT

## 2020-11-16 PROCEDURE — 85014 HEMATOCRIT: CPT

## 2020-11-16 PROCEDURE — 82330 ASSAY OF CALCIUM: CPT

## 2020-11-16 PROCEDURE — C2623 CATH, TRANSLUMIN, DRUG-COAT: HCPCS | Performed by: SURGERY

## 2020-11-16 PROCEDURE — C1894 INTRO/SHEATH, NON-LASER: HCPCS | Performed by: SURGERY

## 2020-11-16 PROCEDURE — 76937 US GUIDE VASCULAR ACCESS: CPT | Performed by: SURGERY

## 2020-11-16 PROCEDURE — 37226 PR REVASCULARIZE FEM/POP ARTERY,ANGIOPLASTY/STENT: CPT | Performed by: SURGERY

## 2020-11-16 PROCEDURE — 37232 PR REVASCULARIZE TIBIAL/PERON ARTERY,ANGIOPLASTY EA ADD: CPT | Performed by: SURGERY

## 2020-11-16 PROCEDURE — C1760 CLOSURE DEV, VASC: HCPCS | Performed by: SURGERY

## 2020-11-16 PROCEDURE — C1876 STENT, NON-COA/NON-COV W/DEL: HCPCS | Performed by: SURGERY

## 2020-11-16 PROCEDURE — C1725 CATH, TRANSLUMIN NON-LASER: HCPCS

## 2020-11-16 PROCEDURE — G9198 NO ORDER FOR CEPH NO REASON: HCPCS | Performed by: SURGERY

## 2020-11-16 PROCEDURE — 75710 ARTERY X-RAYS ARM/LEG: CPT | Performed by: SURGERY

## 2020-11-16 PROCEDURE — 84132 ASSAY OF SERUM POTASSIUM: CPT

## 2020-11-16 PROCEDURE — 76937 US GUIDE VASCULAR ACCESS: CPT

## 2020-11-16 PROCEDURE — C1725 CATH, TRANSLUMIN NON-LASER: HCPCS | Performed by: SURGERY

## 2020-11-16 PROCEDURE — C1714 CATH, TRANS ATHERECTOMY, DIR: HCPCS

## 2020-11-16 PROCEDURE — 82803 BLOOD GASES ANY COMBINATION: CPT

## 2020-11-16 PROCEDURE — 75625 CONTRAST EXAM ABDOMINL AORTA: CPT

## 2020-11-16 PROCEDURE — 85347 COAGULATION TIME ACTIVATED: CPT

## 2020-11-16 PROCEDURE — C1887 CATHETER, GUIDING: HCPCS

## 2020-11-16 PROCEDURE — 37229 PR REVASCULARIZE TIBIAL/PERON ARTERY,ANGIOPLASTY/ATHERECTOMY INITIAL: CPT | Performed by: SURGERY

## 2020-11-16 PROCEDURE — 75710 ARTERY X-RAYS ARM/LEG: CPT

## 2020-11-16 PROCEDURE — C1884 EMBOLIZATION PROTECT SYST: HCPCS | Performed by: SURGERY

## 2020-11-16 DEVICE — MYNX CONTROL VCD 6F 7F
Type: IMPLANTABLE DEVICE | Site: GROIN | Status: FUNCTIONAL
Brand: MYNX CONTROL

## 2020-11-16 DEVICE — IMPLANTABLE DEVICE: Type: IMPLANTABLE DEVICE | Site: ARTERIAL | Status: FUNCTIONAL

## 2020-11-16 RX ORDER — NEOSTIGMINE METHYLSULFATE 1 MG/ML
INJECTION INTRAVENOUS AS NEEDED
Status: DISCONTINUED | OUTPATIENT
Start: 2020-11-16 | End: 2020-11-16

## 2020-11-16 RX ORDER — GLYCOPYRROLATE 0.2 MG/ML
INJECTION INTRAMUSCULAR; INTRAVENOUS AS NEEDED
Status: DISCONTINUED | OUTPATIENT
Start: 2020-11-16 | End: 2020-11-16

## 2020-11-16 RX ORDER — HEPARIN SODIUM 1000 [USP'U]/ML
INJECTION, SOLUTION INTRAVENOUS; SUBCUTANEOUS AS NEEDED
Status: DISCONTINUED | OUTPATIENT
Start: 2020-11-16 | End: 2020-11-16

## 2020-11-16 RX ORDER — PROPOFOL 10 MG/ML
INJECTION, EMULSION INTRAVENOUS CONTINUOUS PRN
Status: DISCONTINUED | OUTPATIENT
Start: 2020-11-16 | End: 2020-11-16

## 2020-11-16 RX ORDER — DEXAMETHASONE SODIUM PHOSPHATE 10 MG/ML
INJECTION, SOLUTION INTRAMUSCULAR; INTRAVENOUS AS NEEDED
Status: DISCONTINUED | OUTPATIENT
Start: 2020-11-16 | End: 2020-11-16

## 2020-11-16 RX ORDER — CLOPIDOGREL BISULFATE 75 MG/1
300 TABLET ORAL ONCE
Status: COMPLETED | OUTPATIENT
Start: 2020-11-16 | End: 2020-11-16

## 2020-11-16 RX ORDER — ONDANSETRON 2 MG/ML
INJECTION INTRAMUSCULAR; INTRAVENOUS AS NEEDED
Status: DISCONTINUED | OUTPATIENT
Start: 2020-11-16 | End: 2020-11-16

## 2020-11-16 RX ORDER — ROCURONIUM BROMIDE 10 MG/ML
INJECTION, SOLUTION INTRAVENOUS AS NEEDED
Status: DISCONTINUED | OUTPATIENT
Start: 2020-11-16 | End: 2020-11-16

## 2020-11-16 RX ORDER — FENTANYL CITRATE/PF 50 MCG/ML
50 SYRINGE (ML) INJECTION
Status: DISCONTINUED | OUTPATIENT
Start: 2020-11-16 | End: 2020-11-16 | Stop reason: HOSPADM

## 2020-11-16 RX ORDER — SODIUM CHLORIDE 9 MG/ML
75 INJECTION, SOLUTION INTRAVENOUS CONTINUOUS
Status: DISPENSED | OUTPATIENT
Start: 2020-11-16 | End: 2020-11-16

## 2020-11-16 RX ORDER — ONDANSETRON 2 MG/ML
4 INJECTION INTRAMUSCULAR; INTRAVENOUS ONCE AS NEEDED
Status: DISCONTINUED | OUTPATIENT
Start: 2020-11-16 | End: 2020-11-16 | Stop reason: HOSPADM

## 2020-11-16 RX ORDER — HYDROMORPHONE HCL/PF 1 MG/ML
0.5 SYRINGE (ML) INJECTION
Status: DISCONTINUED | OUTPATIENT
Start: 2020-11-16 | End: 2020-11-16 | Stop reason: HOSPADM

## 2020-11-16 RX ORDER — SODIUM CHLORIDE, SODIUM LACTATE, POTASSIUM CHLORIDE, CALCIUM CHLORIDE 600; 310; 30; 20 MG/100ML; MG/100ML; MG/100ML; MG/100ML
20 INJECTION, SOLUTION INTRAVENOUS CONTINUOUS
Status: DISCONTINUED | OUTPATIENT
Start: 2020-11-16 | End: 2020-11-16 | Stop reason: HOSPADM

## 2020-11-16 RX ORDER — LIDOCAINE HYDROCHLORIDE 10 MG/ML
0.5 INJECTION, SOLUTION EPIDURAL; INFILTRATION; INTRACAUDAL; PERINEURAL ONCE AS NEEDED
Status: COMPLETED | OUTPATIENT
Start: 2020-11-16 | End: 2020-11-16

## 2020-11-16 RX ORDER — LIDOCAINE HYDROCHLORIDE 10 MG/ML
INJECTION, SOLUTION EPIDURAL; INFILTRATION; INTRACAUDAL; PERINEURAL AS NEEDED
Status: DISCONTINUED | OUTPATIENT
Start: 2020-11-16 | End: 2020-11-16

## 2020-11-16 RX ORDER — LABETALOL 20 MG/4 ML (5 MG/ML) INTRAVENOUS SYRINGE
AS NEEDED
Status: DISCONTINUED | OUTPATIENT
Start: 2020-11-16 | End: 2020-11-16

## 2020-11-16 RX ORDER — EPHEDRINE SULFATE 50 MG/ML
INJECTION INTRAVENOUS AS NEEDED
Status: DISCONTINUED | OUTPATIENT
Start: 2020-11-16 | End: 2020-11-16

## 2020-11-16 RX ORDER — OXYCODONE HYDROCHLORIDE 5 MG/1
5 TABLET ORAL EVERY 4 HOURS PRN
Status: DISCONTINUED | OUTPATIENT
Start: 2020-11-16 | End: 2020-11-16 | Stop reason: HOSPADM

## 2020-11-16 RX ORDER — OXYCODONE HYDROCHLORIDE 5 MG/1
10 TABLET ORAL EVERY 4 HOURS PRN
Status: DISCONTINUED | OUTPATIENT
Start: 2020-11-16 | End: 2020-11-16 | Stop reason: HOSPADM

## 2020-11-16 RX ORDER — FENTANYL CITRATE 50 UG/ML
25 INJECTION, SOLUTION INTRAMUSCULAR; INTRAVENOUS EVERY 2 HOUR PRN
Status: DISCONTINUED | OUTPATIENT
Start: 2020-11-16 | End: 2020-11-16 | Stop reason: HOSPADM

## 2020-11-16 RX ORDER — ACETAMINOPHEN 325 MG/1
650 TABLET ORAL EVERY 4 HOURS PRN
Status: DISCONTINUED | OUTPATIENT
Start: 2020-11-16 | End: 2020-11-16 | Stop reason: HOSPADM

## 2020-11-16 RX ORDER — ACETAMINOPHEN 500 MG
1000 TABLET ORAL EVERY 6 HOURS PRN
COMMUNITY
End: 2021-12-15 | Stop reason: ALTCHOICE

## 2020-11-16 RX ORDER — HEPARIN SODIUM 200 [USP'U]/100ML
INJECTION, SOLUTION INTRAVENOUS
Status: COMPLETED | OUTPATIENT
Start: 2020-11-16 | End: 2020-11-16

## 2020-11-16 RX ORDER — FENTANYL CITRATE 50 UG/ML
INJECTION, SOLUTION INTRAMUSCULAR; INTRAVENOUS AS NEEDED
Status: DISCONTINUED | OUTPATIENT
Start: 2020-11-16 | End: 2020-11-16

## 2020-11-16 RX ORDER — PROPOFOL 10 MG/ML
INJECTION, EMULSION INTRAVENOUS AS NEEDED
Status: DISCONTINUED | OUTPATIENT
Start: 2020-11-16 | End: 2020-11-16

## 2020-11-16 RX ORDER — CLOPIDOGREL BISULFATE 75 MG/1
75 TABLET ORAL DAILY
Qty: 90 TABLET | Refills: 0 | Status: SHIPPED | OUTPATIENT
Start: 2020-11-16 | End: 2021-12-15 | Stop reason: ALTCHOICE

## 2020-11-16 RX ORDER — LIDOCAINE HYDROCHLORIDE 10 MG/ML
INJECTION, SOLUTION EPIDURAL; INFILTRATION; INTRACAUDAL; PERINEURAL AS NEEDED
Status: DISCONTINUED | OUTPATIENT
Start: 2020-11-16 | End: 2020-11-16 | Stop reason: HOSPADM

## 2020-11-16 RX ADMIN — EPHEDRINE SULFATE 10 MG: 50 INJECTION, SOLUTION INTRAVENOUS at 10:12

## 2020-11-16 RX ADMIN — SODIUM CHLORIDE, SODIUM LACTATE, POTASSIUM CHLORIDE, AND CALCIUM CHLORIDE 20 ML/HR: .6; .31; .03; .02 INJECTION, SOLUTION INTRAVENOUS at 08:43

## 2020-11-16 RX ADMIN — GLYCOPYRROLATE 0.6 MG: 0.2 INJECTION, SOLUTION INTRAMUSCULAR; INTRAVENOUS at 12:00

## 2020-11-16 RX ADMIN — LIDOCAINE HYDROCHLORIDE 50 MG: 10 INJECTION, SOLUTION EPIDURAL; INFILTRATION; INTRACAUDAL; PERINEURAL at 09:38

## 2020-11-16 RX ADMIN — ROCURONIUM BROMIDE 40 MG: 50 INJECTION, SOLUTION INTRAVENOUS at 09:48

## 2020-11-16 RX ADMIN — PROPOFOL 100 MCG/KG/MIN: 10 INJECTION, EMULSION INTRAVENOUS at 09:38

## 2020-11-16 RX ADMIN — HEPARIN SODIUM 1000 UNITS: 1000 INJECTION INTRAVENOUS; SUBCUTANEOUS at 10:03

## 2020-11-16 RX ADMIN — PHENYLEPHRINE HYDROCHLORIDE 100 MCG: 10 INJECTION INTRAVENOUS at 10:06

## 2020-11-16 RX ADMIN — SODIUM CHLORIDE, SODIUM LACTATE, POTASSIUM CHLORIDE, AND CALCIUM CHLORIDE: .6; .31; .03; .02 INJECTION, SOLUTION INTRAVENOUS at 08:18

## 2020-11-16 RX ADMIN — HEPARIN SODIUM 6000 UNITS: 1000 INJECTION INTRAVENOUS; SUBCUTANEOUS at 10:13

## 2020-11-16 RX ADMIN — HEPARIN SODIUM 3000 UNITS: 1000 INJECTION INTRAVENOUS; SUBCUTANEOUS at 11:06

## 2020-11-16 RX ADMIN — FENTANYL CITRATE 25 MCG: 50 INJECTION, SOLUTION INTRAMUSCULAR; INTRAVENOUS at 12:05

## 2020-11-16 RX ADMIN — FENTANYL CITRATE 50 MCG: 50 INJECTION, SOLUTION INTRAMUSCULAR; INTRAVENOUS at 09:49

## 2020-11-16 RX ADMIN — SODIUM CHLORIDE, SODIUM LACTATE, POTASSIUM CHLORIDE, AND CALCIUM CHLORIDE: .6; .31; .03; .02 INJECTION, SOLUTION INTRAVENOUS at 12:11

## 2020-11-16 RX ADMIN — ONDANSETRON 4 MG: 2 INJECTION INTRAMUSCULAR; INTRAVENOUS at 10:20

## 2020-11-16 RX ADMIN — PROPOFOL 40 MG: 10 INJECTION, EMULSION INTRAVENOUS at 09:38

## 2020-11-16 RX ADMIN — PHENYLEPHRINE HYDROCHLORIDE 30 MCG/MIN: 10 INJECTION INTRAVENOUS at 10:18

## 2020-11-16 RX ADMIN — EPHEDRINE SULFATE 10 MG: 50 INJECTION, SOLUTION INTRAVENOUS at 11:30

## 2020-11-16 RX ADMIN — HEPARIN SODIUM 1000 UNITS: 1000 INJECTION INTRAVENOUS; SUBCUTANEOUS at 10:33

## 2020-11-16 RX ADMIN — LIDOCAINE HYDROCHLORIDE 0.2 ML: 10 INJECTION, SOLUTION EPIDURAL; INFILTRATION; INTRACAUDAL; PERINEURAL at 08:43

## 2020-11-16 RX ADMIN — DEXAMETHASONE SODIUM PHOSPHATE 10 MG: 10 INJECTION, SOLUTION INTRAMUSCULAR; INTRAVENOUS at 10:20

## 2020-11-16 RX ADMIN — NEOSTIGMINE METHYLSULFATE 3 MG: 1 INJECTION, SOLUTION INTRAVENOUS at 12:00

## 2020-11-16 RX ADMIN — LABETALOL 20 MG/4 ML (5 MG/ML) INTRAVENOUS SYRINGE 5 MG: at 12:03

## 2020-11-16 RX ADMIN — EPHEDRINE SULFATE 10 MG: 50 INJECTION, SOLUTION INTRAVENOUS at 10:18

## 2020-11-16 RX ADMIN — PHENYLEPHRINE HYDROCHLORIDE 100 MCG: 10 INJECTION INTRAVENOUS at 09:57

## 2020-11-16 RX ADMIN — CLOPIDOGREL BISULFATE 300 MG: 75 TABLET ORAL at 16:41

## 2020-11-16 RX ADMIN — FENTANYL CITRATE 25 MCG: 50 INJECTION, SOLUTION INTRAMUSCULAR; INTRAVENOUS at 10:59

## 2020-11-17 ENCOUNTER — OFFICE VISIT (OUTPATIENT)
Dept: WOUND CARE | Facility: HOSPITAL | Age: 80
End: 2020-11-17
Payer: COMMERCIAL

## 2020-11-17 VITALS
SYSTOLIC BLOOD PRESSURE: 114 MMHG | DIASTOLIC BLOOD PRESSURE: 64 MMHG | RESPIRATION RATE: 16 BRPM | TEMPERATURE: 98.1 F | HEART RATE: 64 BPM

## 2020-11-17 DIAGNOSIS — I87.331 CHRONIC VENOUS HYPERTENSION (IDIOPATHIC) WITH ULCER AND INFLAMMATION OF RIGHT LOWER EXTREMITY (CODE) (HCC): Primary | ICD-10-CM

## 2020-11-17 DIAGNOSIS — I73.9 PERIPHERAL ARTERIAL DISEASE (HCC): ICD-10-CM

## 2020-11-17 DIAGNOSIS — L97.318 NON-PRESSURE CHRONIC ULCER OF RIGHT ANKLE WITH OTHER SPECIFIED SEVERITY (HCC): ICD-10-CM

## 2020-11-17 PROCEDURE — 97597 DBRDMT OPN WND 1ST 20 CM/<: CPT | Performed by: PREVENTIVE MEDICINE

## 2020-11-17 RX ORDER — LIDOCAINE HYDROCHLORIDE 40 MG/ML
5 SOLUTION TOPICAL ONCE
Status: COMPLETED | OUTPATIENT
Start: 2020-11-17 | End: 2020-11-17

## 2020-11-17 RX ADMIN — LIDOCAINE HYDROCHLORIDE 5 ML: 40 SOLUTION TOPICAL at 13:13

## 2020-11-27 ENCOUNTER — TELEPHONE (OUTPATIENT)
Dept: VASCULAR SURGERY | Facility: CLINIC | Age: 80
End: 2020-11-27

## 2020-12-01 ENCOUNTER — TELEPHONE (OUTPATIENT)
Dept: VASCULAR SURGERY | Facility: CLINIC | Age: 80
End: 2020-12-01

## 2020-12-01 ENCOUNTER — OFFICE VISIT (OUTPATIENT)
Dept: WOUND CARE | Facility: HOSPITAL | Age: 80
End: 2020-12-01
Payer: COMMERCIAL

## 2020-12-01 VITALS
SYSTOLIC BLOOD PRESSURE: 122 MMHG | HEART RATE: 72 BPM | DIASTOLIC BLOOD PRESSURE: 58 MMHG | RESPIRATION RATE: 18 BRPM | TEMPERATURE: 97.7 F

## 2020-12-01 DIAGNOSIS — L97.318 NON-PRESSURE CHRONIC ULCER OF RIGHT ANKLE WITH OTHER SPECIFIED SEVERITY (HCC): ICD-10-CM

## 2020-12-01 DIAGNOSIS — I73.9 PERIPHERAL ARTERIAL DISEASE (HCC): ICD-10-CM

## 2020-12-01 DIAGNOSIS — I87.331 CHRONIC VENOUS HYPERTENSION (IDIOPATHIC) WITH ULCER AND INFLAMMATION OF RIGHT LOWER EXTREMITY (CODE) (HCC): Primary | ICD-10-CM

## 2020-12-01 PROCEDURE — 99213 OFFICE O/P EST LOW 20 MIN: CPT | Performed by: PREVENTIVE MEDICINE

## 2020-12-01 PROCEDURE — G0463 HOSPITAL OUTPT CLINIC VISIT: HCPCS | Performed by: PREVENTIVE MEDICINE

## 2020-12-02 ENCOUNTER — OFFICE VISIT (OUTPATIENT)
Dept: VASCULAR SURGERY | Facility: CLINIC | Age: 80
End: 2020-12-02
Payer: COMMERCIAL

## 2020-12-02 VITALS
TEMPERATURE: 97.6 F | HEIGHT: 66 IN | WEIGHT: 163 LBS | DIASTOLIC BLOOD PRESSURE: 68 MMHG | SYSTOLIC BLOOD PRESSURE: 120 MMHG | HEART RATE: 70 BPM | BODY MASS INDEX: 26.2 KG/M2

## 2020-12-02 DIAGNOSIS — I87.2 CHRONIC VENOUS INSUFFICIENCY: ICD-10-CM

## 2020-12-02 DIAGNOSIS — I10 BENIGN ESSENTIAL HYPERTENSION: ICD-10-CM

## 2020-12-02 DIAGNOSIS — E78.00 HYPERCHOLESTEROLEMIA: ICD-10-CM

## 2020-12-02 DIAGNOSIS — I73.9 PERIPHERAL ARTERIAL DISEASE (HCC): Primary | ICD-10-CM

## 2020-12-02 DIAGNOSIS — I65.23 ASYMPTOMATIC BILATERAL CAROTID ARTERY STENOSIS: ICD-10-CM

## 2020-12-02 PROCEDURE — 99214 OFFICE O/P EST MOD 30 MIN: CPT | Performed by: SURGERY

## 2020-12-08 ENCOUNTER — OFFICE VISIT (OUTPATIENT)
Dept: FAMILY MEDICINE CLINIC | Facility: CLINIC | Age: 80
End: 2020-12-08
Payer: COMMERCIAL

## 2020-12-08 VITALS
HEART RATE: 66 BPM | OXYGEN SATURATION: 99 % | SYSTOLIC BLOOD PRESSURE: 122 MMHG | RESPIRATION RATE: 16 BRPM | TEMPERATURE: 97.2 F | DIASTOLIC BLOOD PRESSURE: 68 MMHG | BODY MASS INDEX: 26.48 KG/M2 | HEIGHT: 66 IN | WEIGHT: 164.8 LBS

## 2020-12-08 DIAGNOSIS — I87.2 CHRONIC VENOUS INSUFFICIENCY: ICD-10-CM

## 2020-12-08 DIAGNOSIS — E78.00 HYPERCHOLESTEROLEMIA: ICD-10-CM

## 2020-12-08 DIAGNOSIS — I73.9 PERIPHERAL ARTERIAL DISEASE (HCC): ICD-10-CM

## 2020-12-08 DIAGNOSIS — I10 ESSENTIAL HYPERTENSION: ICD-10-CM

## 2020-12-08 DIAGNOSIS — Z01.818 PRE-OPERATIVE CLEARANCE: Primary | ICD-10-CM

## 2020-12-08 DIAGNOSIS — C61 PROSTATE CANCER (HCC): ICD-10-CM

## 2020-12-08 DIAGNOSIS — H25.9 AGE-RELATED CATARACT OF BOTH EYES, UNSPECIFIED AGE-RELATED CATARACT TYPE: ICD-10-CM

## 2020-12-08 PROCEDURE — 1036F TOBACCO NON-USER: CPT | Performed by: FAMILY MEDICINE

## 2020-12-08 PROCEDURE — 1160F RVW MEDS BY RX/DR IN RCRD: CPT | Performed by: FAMILY MEDICINE

## 2020-12-08 PROCEDURE — 1101F PT FALLS ASSESS-DOCD LE1/YR: CPT | Performed by: FAMILY MEDICINE

## 2020-12-08 PROCEDURE — 99243 OFF/OP CNSLTJ NEW/EST LOW 30: CPT | Performed by: FAMILY MEDICINE

## 2020-12-08 PROCEDURE — 3725F SCREEN DEPRESSION PERFORMED: CPT | Performed by: FAMILY MEDICINE

## 2020-12-08 PROCEDURE — 3078F DIAST BP <80 MM HG: CPT | Performed by: FAMILY MEDICINE

## 2020-12-08 PROCEDURE — 3288F FALL RISK ASSESSMENT DOCD: CPT | Performed by: FAMILY MEDICINE

## 2020-12-08 PROCEDURE — 3074F SYST BP LT 130 MM HG: CPT | Performed by: FAMILY MEDICINE

## 2020-12-08 RX ORDER — ATENOLOL 25 MG/1
12.5 TABLET ORAL DAILY
Qty: 45 TABLET | Refills: 1 | Status: SHIPPED | OUTPATIENT
Start: 2020-12-08 | End: 2021-06-23 | Stop reason: SDUPTHER

## 2020-12-08 RX ORDER — IRBESARTAN 150 MG/1
150 TABLET ORAL
Qty: 90 TABLET | Refills: 1 | Status: SHIPPED | OUTPATIENT
Start: 2020-12-08 | End: 2021-06-23 | Stop reason: SDUPTHER

## 2020-12-22 ENCOUNTER — OFFICE VISIT (OUTPATIENT)
Dept: WOUND CARE | Facility: HOSPITAL | Age: 80
End: 2020-12-22
Payer: COMMERCIAL

## 2020-12-22 VITALS
HEART RATE: 71 BPM | RESPIRATION RATE: 16 BRPM | SYSTOLIC BLOOD PRESSURE: 133 MMHG | TEMPERATURE: 97.9 F | DIASTOLIC BLOOD PRESSURE: 61 MMHG

## 2020-12-22 DIAGNOSIS — L97.318 NON-PRESSURE CHRONIC ULCER OF RIGHT ANKLE WITH OTHER SPECIFIED SEVERITY (HCC): ICD-10-CM

## 2020-12-22 DIAGNOSIS — I87.331 CHRONIC VENOUS HYPERTENSION (IDIOPATHIC) WITH ULCER AND INFLAMMATION OF RIGHT LOWER EXTREMITY (CODE) (HCC): Primary | ICD-10-CM

## 2020-12-22 DIAGNOSIS — I73.9 PERIPHERAL ARTERIAL DISEASE (HCC): ICD-10-CM

## 2020-12-22 PROCEDURE — 99212 OFFICE O/P EST SF 10 MIN: CPT | Performed by: PREVENTIVE MEDICINE

## 2020-12-22 PROCEDURE — G0463 HOSPITAL OUTPT CLINIC VISIT: HCPCS | Performed by: PREVENTIVE MEDICINE

## 2020-12-22 PROCEDURE — 99213 OFFICE O/P EST LOW 20 MIN: CPT | Performed by: PREVENTIVE MEDICINE

## 2021-01-21 ENCOUNTER — IMMUNIZATIONS (OUTPATIENT)
Dept: FAMILY MEDICINE CLINIC | Facility: HOSPITAL | Age: 81
End: 2021-01-21

## 2021-01-21 DIAGNOSIS — Z23 ENCOUNTER FOR IMMUNIZATION: Primary | ICD-10-CM

## 2021-01-21 PROCEDURE — 0001A SARS-COV-2 / COVID-19 MRNA VACCINE (PFIZER-BIONTECH) 30 MCG: CPT

## 2021-01-21 PROCEDURE — 91300 SARS-COV-2 / COVID-19 MRNA VACCINE (PFIZER-BIONTECH) 30 MCG: CPT

## 2021-01-29 DIAGNOSIS — E78.00 HYPERCHOLESTEREMIA: ICD-10-CM

## 2021-01-29 RX ORDER — SIMVASTATIN 20 MG
TABLET ORAL
Qty: 90 TABLET | Refills: 1 | Status: SHIPPED | OUTPATIENT
Start: 2021-01-29 | End: 2021-06-23 | Stop reason: SDUPTHER

## 2021-02-11 ENCOUNTER — IMMUNIZATIONS (OUTPATIENT)
Dept: FAMILY MEDICINE CLINIC | Facility: HOSPITAL | Age: 81
End: 2021-02-11

## 2021-02-11 DIAGNOSIS — Z23 ENCOUNTER FOR IMMUNIZATION: Primary | ICD-10-CM

## 2021-02-11 PROCEDURE — 91300 SARS-COV-2 / COVID-19 MRNA VACCINE (PFIZER-BIONTECH) 30 MCG: CPT

## 2021-02-11 PROCEDURE — 0002A SARS-COV-2 / COVID-19 MRNA VACCINE (PFIZER-BIONTECH) 30 MCG: CPT

## 2021-06-02 ENCOUNTER — HOSPITAL ENCOUNTER (OUTPATIENT)
Dept: NON INVASIVE DIAGNOSTICS | Facility: CLINIC | Age: 81
Discharge: HOME | End: 2021-06-02
Payer: COMMERCIAL

## 2021-06-02 DIAGNOSIS — I73.9 PERIPHERAL ARTERIAL DISEASE (HCC): ICD-10-CM

## 2021-06-02 PROCEDURE — 93923 UPR/LXTR ART STDY 3+ LVLS: CPT

## 2021-06-02 PROCEDURE — 93925 LOWER EXTREMITY STUDY: CPT

## 2021-06-04 PROCEDURE — 93925 LOWER EXTREMITY STUDY: CPT | Performed by: SURGERY

## 2021-06-04 PROCEDURE — 93922 UPR/L XTREMITY ART 2 LEVELS: CPT | Performed by: SURGERY

## 2021-06-08 ENCOUNTER — TELEPHONE (OUTPATIENT)
Dept: VASCULAR SURGERY | Facility: CLINIC | Age: 81
End: 2021-06-08

## 2021-06-08 NOTE — TELEPHONE ENCOUNTER
----- Message from Guero Encarnacion sent at 6/7/2021 11:13 AM EDT -----  Call patient for OV with Karen Gar

## 2021-06-17 ENCOUNTER — RA CDI HCC (OUTPATIENT)
Dept: OTHER | Facility: HOSPITAL | Age: 81
End: 2021-06-17

## 2021-06-17 NOTE — PROGRESS NOTES
[facility] [employer]  coding opportunities             Chart reviewed, (number of) suggestions sent to provider: 1            Number of suggestions actually used: 1      Number of suggestions NOT actually used: 0     Patients insurance company: [employer] (Medicare Advantage and Commercial)     Visit status: Patient arrived for their scheduled appointment   dx is on bill [address]  Provider never responded to [facility] [employer]  coding request     [facility] [employer]  coding opportunities             Chart reviewed, (number of) suggestions sent to provider: 1   DX:  I73 9-Peripheral vascular disease, unspecified    Prostate ca is hx and ulcer is healed back in 2020               Patients insurance company: [employer] ([employer])

## 2021-06-23 ENCOUNTER — OFFICE VISIT (OUTPATIENT)
Dept: FAMILY MEDICINE CLINIC | Facility: CLINIC | Age: 81
End: 2021-06-23
Payer: COMMERCIAL

## 2021-06-23 ENCOUNTER — OFFICE VISIT (OUTPATIENT)
Dept: VASCULAR SURGERY | Facility: CLINIC | Age: 81
End: 2021-06-23
Payer: COMMERCIAL

## 2021-06-23 VITALS
BODY MASS INDEX: 27.1 KG/M2 | OXYGEN SATURATION: 98 % | HEART RATE: 66 BPM | TEMPERATURE: 96.8 F | DIASTOLIC BLOOD PRESSURE: 70 MMHG | HEIGHT: 66 IN | SYSTOLIC BLOOD PRESSURE: 110 MMHG | RESPIRATION RATE: 20 BRPM | WEIGHT: 168.6 LBS

## 2021-06-23 VITALS
SYSTOLIC BLOOD PRESSURE: 130 MMHG | DIASTOLIC BLOOD PRESSURE: 70 MMHG | WEIGHT: 169 LBS | HEIGHT: 66 IN | BODY MASS INDEX: 27.16 KG/M2 | HEART RATE: 69 BPM | TEMPERATURE: 98.5 F

## 2021-06-23 DIAGNOSIS — E78.00 HYPERCHOLESTEREMIA: ICD-10-CM

## 2021-06-23 DIAGNOSIS — Z85.828 HISTORY OF SKIN CANCER: ICD-10-CM

## 2021-06-23 DIAGNOSIS — C61 PROSTATE CANCER (HCC): ICD-10-CM

## 2021-06-23 DIAGNOSIS — E55.9 VITAMIN D DEFICIENCY: ICD-10-CM

## 2021-06-23 DIAGNOSIS — I10 ESSENTIAL HYPERTENSION: ICD-10-CM

## 2021-06-23 DIAGNOSIS — I87.2 CHRONIC VENOUS INSUFFICIENCY: ICD-10-CM

## 2021-06-23 DIAGNOSIS — I73.9 PERIPHERAL VASCULAR DISEASE, UNSPECIFIED (HCC): ICD-10-CM

## 2021-06-23 DIAGNOSIS — I73.9 PERIPHERAL VASCULAR DISEASE, UNSPECIFIED (HCC): Primary | ICD-10-CM

## 2021-06-23 DIAGNOSIS — H91.93 DECREASED HEARING OF BOTH EARS: ICD-10-CM

## 2021-06-23 DIAGNOSIS — I73.9 PERIPHERAL ARTERIAL DISEASE (HCC): Primary | ICD-10-CM

## 2021-06-23 PROCEDURE — 3725F SCREEN DEPRESSION PERFORMED: CPT | Performed by: FAMILY MEDICINE

## 2021-06-23 PROCEDURE — 1160F RVW MEDS BY RX/DR IN RCRD: CPT | Performed by: SURGERY

## 2021-06-23 PROCEDURE — 3075F SYST BP GE 130 - 139MM HG: CPT | Performed by: SURGERY

## 2021-06-23 PROCEDURE — 1036F TOBACCO NON-USER: CPT | Performed by: FAMILY MEDICINE

## 2021-06-23 PROCEDURE — 99214 OFFICE O/P EST MOD 30 MIN: CPT | Performed by: SURGERY

## 2021-06-23 PROCEDURE — 99214 OFFICE O/P EST MOD 30 MIN: CPT | Performed by: FAMILY MEDICINE

## 2021-06-23 PROCEDURE — 3078F DIAST BP <80 MM HG: CPT | Performed by: SURGERY

## 2021-06-23 RX ORDER — ATENOLOL 25 MG/1
12.5 TABLET ORAL DAILY
Qty: 45 TABLET | Refills: 1 | Status: SHIPPED | OUTPATIENT
Start: 2021-06-23 | End: 2021-12-15 | Stop reason: SDUPTHER

## 2021-06-23 RX ORDER — SIMVASTATIN 20 MG
TABLET ORAL
Qty: 90 TABLET | Refills: 1 | Status: SHIPPED | OUTPATIENT
Start: 2021-06-23 | End: 2021-10-08 | Stop reason: SDUPTHER

## 2021-06-23 RX ORDER — ASPIRIN 325 MG
325 TABLET ORAL AS NEEDED
COMMUNITY

## 2021-06-23 RX ORDER — IRBESARTAN 150 MG/1
150 TABLET ORAL
Qty: 90 TABLET | Refills: 1 | Status: SHIPPED | OUTPATIENT
Start: 2021-06-23 | End: 2021-12-15 | Stop reason: SDUPTHER

## 2021-06-23 RX ORDER — ASPIRIN 81 MG/1
81 TABLET ORAL AS NEEDED
COMMUNITY

## 2021-06-23 NOTE — PROGRESS NOTES
Chief Complaint   Patient presents with    Follow-up     6 month f/u       Assessment/Plan:  Derm and ENT evaluation  PHQ-9 Depression Screening    PHQ-9:   Frequency of the following problems over the past two weeks:      Little interest or pleasure in doing things: 0 - not at all  Feeling down, depressed, or hopeless: 0 - not at all  PHQ-2 Score: 0       BMI Counseling: Body mass index is 27 21 kg/m²  The BMI is above normal  Nutrition recommendations include consuming healthier snacks, moderation in carbohydrate intake and increasing intake of lean protein  Diagnoses and all orders for this visit:    Peripheral vascular disease, unspecified ([place] [address] )    Essential hypertension  -     atenolol (TENORMIN) 25 mg tablet; Take 0 5 tablets (12 5 mg total) by mouth daily  -     irbesartan (AVAPRO) 150 mg tablet; Take 1 tablet (150 mg total) by mouth daily at bedtime  -     Basic metabolic panel; Future  -     CBC and Platelet; Future  -     Hepatic function panel; Future    Hypercholesteremia  -     simvastatin (ZOCOR) 20 mg tablet; One po daily  -     Lipid panel; Future    Prostate cancer ([place] [address] )  -     PSA, Total Screen; Future    History of skin cancer  -     Ambulatory referral to Dermatology; Future    Decreased hearing of both ears  -     Ambulatory Referral to Otolaryngology; Future    Vitamin D deficiency  -     Vitamin D 25 hydroxy; Future          Subjective:      Patient ID: Aniket Whitfield is a 79 y o  male  Refill Med's, review labs  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, Professor of [employer]        The following portions of the patient's history were reviewed and updated as appropriate: allergies, current medications, past medical history, past social history, past surgical history and problem list   I have spent 25 minutes with Patient  today in which greater than 50% of this time was spent in counseling/coordination of care regarding Diagnostic results, Risks and benefits of tx options, Intructions for management, Patient and family education, Risk factor reductions and Impressions  Review of Systems   Constitutional: Negative  HENT: Positive for hearing loss  Decreased hearing  Eyes: Negative  Respiratory: Negative  Cardiovascular: Negative  Gastrointestinal: Negative  Genitourinary: Negative  Musculoskeletal: Negative  Skin: Negative  Neurological: Negative  Psychiatric/Behavioral: Negative  Objective:      /70 (BP Location: Left arm)   Pulse 66   Temp (!) 96 8 °F (36 °C) (Temporal)   Resp 20   Ht 5' 6" (1 676 m)   Wt 76 5 kg (168 lb 9 6 oz)   SpO2 98%   BMI 27 21 kg/m²       Current Outpatient Medications:     acetaminophen (TYLENOL) 500 mg tablet, Take 1,000 mg by mouth every 6 (six) hours as needed for mild pain, Disp: , Rfl:     bimatoprost (LUMIGAN) 0 01 % ophthalmic drops, Administer 1 drop to both eyes daily at bedtime, Disp: , Rfl:     multivitamin (THERAGRAN) TABS, Take 1 tablet by mouth daily, Disp: , Rfl:     simvastatin (ZOCOR) 20 mg tablet, One po daily, Disp: 90 tablet, Rfl: 1    atenolol (TENORMIN) 25 mg tablet, Take 0 5 tablets (12 5 mg total) by mouth daily, Disp: 45 tablet, Rfl: 1    brinzolamide (AZOPT) 1 % ophthalmic suspension, Administer 1 drop to both eyes 2 (two) times a day, Disp: , Rfl:     clopidogrel (PLAVIX) 75 mg tablet, Take 1 tablet (75 mg total) by mouth daily, Disp: 90 tablet, Rfl: 0    irbesartan (AVAPRO) 150 mg tablet, Take 1 tablet (150 mg total) by mouth daily at bedtime, Disp: 90 tablet, Rfl: 1     Allergies   Allergen Reactions    Other Itching and Rash     SESAME SEEDS,SOB        Physical Exam  Constitutional:       Appearance: He is well-developed  HENT:      Head: Normocephalic and atraumatic        Right Ear: Tympanic membrane, ear canal and external ear normal       Left Ear: Tympanic membrane, ear canal and external ear normal       Nose: Nose normal       Mouth/Throat:      Mouth: Mucous membranes are moist       Pharynx: Oropharynx is clear  Eyes:      Conjunctiva/sclera: Conjunctivae normal       Pupils: Pupils are equal, round, and reactive to light  Cardiovascular:      Rate and Rhythm: Normal rate and regular rhythm  Heart sounds: Normal heart sounds  Pulmonary:      Effort: Pulmonary effort is normal       Breath sounds: Normal breath sounds  Abdominal:      General: Abdomen is flat  Bowel sounds are normal       Palpations: Abdomen is soft  Musculoskeletal:      Cervical back: Normal range of motion and neck supple  Skin:     General: Skin is warm and dry  Comments: ? Areas on face that need biopsy  Neurological:      General: No focal deficit present  Mental Status: He is alert and oriented to person, place, and time  Deep Tendon Reflexes: Reflexes are normal and symmetric  Psychiatric:         Mood and Affect: Mood normal          Behavior: Behavior normal          Thought Content:  Thought content normal          Judgment: Judgment normal

## 2021-06-23 NOTE — ASSESSMENT & PLAN NOTE
Moderate in stent stenosis of right SFA/popliteal arteries  However patient continues to have palpable dorsalis pedis pulse  His claudication is not disabling  No real for further interrogation or intervention at this time  Patient to continue on statin, aspirin and Plavix  Patient instructed on the importance of continuing walking exercise program   Will repeat surveillance testing in 6 months

## 2021-06-23 NOTE — LETTER
June 23, 2021     Radha Rangel DO  [facility] [address] [facility] [address] [place] [address]    Patient: Bogdan Pereira   YOB: 1940   Date of Visit: 6/23/2021       Dear Dr Hesham Webber: Thank you for referring Kinjal Garrett to me for evaluation  Below are the relevant portions of my assessment and plan of care  Patient is here to rev CARSON done on 6/02/21  Study reveals an JANICE to the right lower extremity of 0 88  Duplex portion of the study shows somewhat elevated velocity in the right popliteal artery suggestive of moderate stenosis  The velocity elevation is only 115-184 centimeters/second  There is a palpable right dorsalis pedis pulse  Patient has undergone extensive right lower extremity revascularization and EVLT all of which was performed to heal a right medial malleolar wound - which has remained healed  Patient states he does have occasional calf claudication however, this past week [place]  Patient denies wounds or ulcers  Patient is taking Plavix and simvastatin  If you have questions, please do not hesitate to call me  I look forward to following Lizette Gonzales along with you           Sincerely,        Nneka Harrison MD        CC: No Recipients

## 2021-06-23 NOTE — ASSESSMENT & PLAN NOTE
Status post EVLT of right lower extremity which was performed to assist in healing of a medial malleolar wound  Patient had both peripheral arterial occlusive disease as well as venous insufficiency  Wound has not recurred  Have instructed the patient to wear compression to the left lower extremity as well as he does have significant varicosities and hemosiderin deposition at the medial malleolar aspect  At this time however, skin is intact

## 2021-06-23 NOTE — PROGRESS NOTES
Assessment/Plan:    Peripheral vascular disease, unspecified (HCC)  Moderate in stent stenosis of right SFA/popliteal arteries  However patient continues to have palpable dorsalis pedis pulse  His claudication is not disabling  No real for further interrogation or intervention at this time  Patient to continue on statin, aspirin and Plavix  Patient instructed on the importance of continuing walking exercise program   Will repeat surveillance testing in 6 months  Chronic venous insufficiency  Status post EVLT of right lower extremity which was performed to assist in healing of a medial malleolar wound  Patient had both peripheral arterial occlusive disease as well as venous insufficiency  Wound has not recurred  Have instructed the patient to wear compression to the left lower extremity as well as he does have significant varicosities and hemosiderin deposition at the medial malleolar aspect  At this time however, skin is intact  Diagnoses and all orders for this visit:    Peripheral arterial disease ([facility] [address] )  -     VAS lower limb arterial duplex, complete bilateral; Future    Peripheral vascular disease, unspecified ([facility] [address] )    Chronic venous insufficiency    Other orders  -     aspirin 325 mg tablet; Take 325 mg by mouth as needed for mild pain  -     aspirin (ECOTRIN LOW STRENGTH) 81 mg EC tablet; Take 81 mg by mouth as needed          Subjective:      Patient ID: Ever Londoon is a 79 y o  male  Patient is here to rev CARSON done on 6/02/21  Study reveals an JANICE to the right lower extremity of 0 88  Duplex portion of the study shows somewhat elevated velocity in the right popliteal artery suggestive of moderate stenosis  The velocity elevation is only 115-184 centimeters/second  There is a palpable right dorsalis pedis pulse    Patient has undergone extensive right lower extremity revascularization and EVLT all of which was performed to heal a right medial malleolar wound - which has remained healed  Patient states he does have occasional calf claudication however, this past week [place]  Patient denies wounds or ulcers  Patient is taking Plavix and simvastatin  Previous visits:    Patient underwent extensive percutaneous revascularization of the right lower extremity on 11/16/20  The initial angiogram revealed occlusion of the previously placed SFA/popliteal artery stents  There is also severe stenosis of the anterior tibial artery at its origin and occlusion of the tibioperoneal trunk as well as the proximal portion of the peroneal artery  Patient underwent successful crossing of the occlusions with an additional SFA stent placement measuring 6 mm x 60 mm in length  These were post dilated with a DCB 6 mm balloon angioplasty catheter  The anterior tibial artery was treated with atherectomy and balloon angioplasty with a 3 x 40 chocolate balloon  The tibioperoneal trunk and proximal peroneal arteries were treated with a 2 5 mm x 40 mm chocolate balloon  These procedures were successful in re-establishing a palpable dorsalis pedis pulse in robust signals at the posterior tibial and peroneal arteries  The wound is now nearly healed  Patient was placed on a 3 month postprocedure course of clopidogrel  This is in addition to his daily aspirin and statin therapy  Patient tells me he will be undergoing eye surgery on 12/14/20 which requires holding the Plavix  This will be approximately 3 and half weeks post procedure therefore I have instructed the patient it is okay to hold Plavix 5 days prior to the procedure and restart as soon as the ophthalmologist deems appropriate  He will then complete a 3 month course of Plavix postprocedure  He will remain on aspirin and statin therapy    He will be seeing wound care again in 3 weeks               The following portions of the patient's history were reviewed and updated as appropriate: allergies, current medications, past family history, past medical history, past social history, past surgical history and problem list     Review of Systems   Constitutional: Negative  HENT: Negative  Eyes: Negative  Respiratory: Negative  Cardiovascular: Negative  Gastrointestinal: Negative  Endocrine: Negative  Genitourinary: Negative  Musculoskeletal: Negative  Skin: Negative  Allergic/Immunologic: Negative  Neurological: Negative  Hematological: Negative  Psychiatric/Behavioral: Negative  Objective:      /70 (BP Location: Left arm, Patient Position: Sitting, Cuff Size: Standard)   Pulse 69   Temp 98 5 °F (36 9 °C) (Tympanic)   Ht 5' 6" (1 676 m)   Wt 76 7 kg (169 lb)   BMI 27 28 kg/m²          Physical Exam  Vitals and nursing note reviewed  Constitutional:       Appearance: He is well-developed  HENT:      Head: Normocephalic and atraumatic  Eyes:      Conjunctiva/sclera: Conjunctivae normal       Pupils: Pupils are equal, round, and reactive to light  Neck:      Vascular: No JVD  Cardiovascular:      Comments: Palpable right dorsalis pedis pulse  Robust signal by Doppler at PT/P  Pulmonary:      Effort: No respiratory distress  Breath sounds: No stridor  No wheezing or rales  Chest:      Chest wall: No tenderness  Abdominal:      General: There is no distension  Palpations: There is no mass  Tenderness: There is no abdominal tenderness  There is no guarding or rebound  Musculoskeletal:         General: No tenderness or deformity  Normal range of motion  Cervical back: Normal range of motion and neck supple  Skin:     General: Skin is warm and dry  Findings: No erythema or rash  Neurological:      Mental Status: He is alert and oriented to person, place, and time  Psychiatric:         Behavior: Behavior normal          Thought Content:  Thought content normal

## 2021-06-23 NOTE — PATIENT INSTRUCTIONS
Continue with walking exercise program on a daily basis  Recommend wearing 20-30 mmHg knee-high compression stockings to the bilateral lower extremities      Repeat noninvasive vascular testing in 6 months period

## 2021-07-06 ENCOUNTER — APPOINTMENT (OUTPATIENT)
Dept: LAB | Facility: CLINIC | Age: 81
End: 2021-07-06
Payer: COMMERCIAL

## 2021-07-06 DIAGNOSIS — C61 PROSTATE CANCER (HCC): ICD-10-CM

## 2021-07-06 DIAGNOSIS — I10 ESSENTIAL HYPERTENSION: ICD-10-CM

## 2021-07-06 DIAGNOSIS — E78.00 HYPERCHOLESTEREMIA: ICD-10-CM

## 2021-07-06 DIAGNOSIS — E55.9 VITAMIN D DEFICIENCY: ICD-10-CM

## 2021-07-06 LAB
25(OH)D3 SERPL-MCNC: 37 NG/ML (ref 30–100)
ALBUMIN SERPL BCP-MCNC: 3.5 G/DL (ref 3.5–5)
ALP SERPL-CCNC: 96 U/L (ref 46–116)
ALT SERPL W P-5'-P-CCNC: 26 U/L (ref 12–78)
ANION GAP SERPL CALCULATED.3IONS-SCNC: 7 MMOL/L (ref 4–13)
AST SERPL W P-5'-P-CCNC: 21 U/L (ref 5–45)
BILIRUB DIRECT SERPL-MCNC: 0.1 MG/DL (ref 0–0.2)
BILIRUB SERPL-MCNC: 0.42 MG/DL (ref 0.2–1)
BUN SERPL-MCNC: 33 MG/DL (ref 5–25)
CALCIUM SERPL-MCNC: 8.9 MG/DL (ref 8.3–10.1)
CHLORIDE SERPL-SCNC: 107 MMOL/L (ref 100–108)
CHOLEST SERPL-MCNC: 157 MG/DL (ref 50–200)
CO2 SERPL-SCNC: 25 MMOL/L (ref 21–32)
CREAT SERPL-MCNC: 1.3 MG/DL (ref 0.6–1.3)
ERYTHROCYTE [DISTWIDTH] IN BLOOD BY AUTOMATED COUNT: 14.2 % (ref 11.6–15.1)
GFR SERPL CREATININE-BSD FRML MDRD: 51 ML/MIN/1.73SQ M
GLUCOSE P FAST SERPL-MCNC: 97 MG/DL (ref 65–99)
HCT VFR BLD AUTO: 46.4 % (ref 36.5–49.3)
HDLC SERPL-MCNC: 37 MG/DL
HGB BLD-MCNC: 15 G/DL (ref 12–17)
LDLC SERPL CALC-MCNC: 80 MG/DL (ref 0–100)
MCH RBC QN AUTO: 30 PG (ref 26.8–34.3)
MCHC RBC AUTO-ENTMCNC: 32.3 G/DL (ref 31.4–37.4)
MCV RBC AUTO: 93 FL (ref 82–98)
NONHDLC SERPL-MCNC: 120 MG/DL
PLATELET # BLD AUTO: 187 THOUSANDS/UL (ref 149–390)
PMV BLD AUTO: 10.7 FL (ref 8.9–12.7)
POTASSIUM SERPL-SCNC: 4.4 MMOL/L (ref 3.5–5.3)
PROT SERPL-MCNC: 7.6 G/DL (ref 6.4–8.2)
PSA SERPL-MCNC: <0.1 NG/ML (ref 0–4)
RBC # BLD AUTO: 5 MILLION/UL (ref 3.88–5.62)
SODIUM SERPL-SCNC: 139 MMOL/L (ref 136–145)
TRIGL SERPL-MCNC: 198 MG/DL
WBC # BLD AUTO: 11.03 THOUSAND/UL (ref 4.31–10.16)

## 2021-07-06 PROCEDURE — G0103 PSA SCREENING: HCPCS

## 2021-07-06 PROCEDURE — 80061 LIPID PANEL: CPT

## 2021-07-06 PROCEDURE — 85027 COMPLETE CBC AUTOMATED: CPT

## 2021-07-06 PROCEDURE — 82306 VITAMIN D 25 HYDROXY: CPT

## 2021-07-06 PROCEDURE — 80076 HEPATIC FUNCTION PANEL: CPT

## 2021-07-06 PROCEDURE — 36415 COLL VENOUS BLD VENIPUNCTURE: CPT

## 2021-07-06 PROCEDURE — 80048 BASIC METABOLIC PNL TOTAL CA: CPT

## 2021-07-15 ENCOUNTER — OFFICE VISIT (OUTPATIENT)
Dept: UROLOGY | Facility: CLINIC | Age: 81
End: 2021-07-15
Payer: COMMERCIAL

## 2021-07-15 VITALS
SYSTOLIC BLOOD PRESSURE: 146 MMHG | DIASTOLIC BLOOD PRESSURE: 80 MMHG | WEIGHT: 170 LBS | BODY MASS INDEX: 27.32 KG/M2 | HEIGHT: 66 IN

## 2021-07-15 DIAGNOSIS — C61 PROSTATE CANCER (HCC): Primary | ICD-10-CM

## 2021-07-15 PROCEDURE — 99213 OFFICE O/P EST LOW 20 MIN: CPT | Performed by: PHYSICIAN ASSISTANT

## 2021-07-15 NOTE — PROGRESS NOTES
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   [employer]   No other complaints        Reason for visit:  Prostate cancer follow-up    Objective:     VITALS:    Vitals:    07/15/21 1446   BP: 146/80           LABS:  Lab Results   Component Value Date    HGB 15 0 07/06/2021    HCT 46 4 07/06/2021    WBC 11 03 (H) 07/06/2021     07/06/2021   ]    Lab Results   Component Value Date     12/09/2016    K 4 4 07/06/2021     07/06/2021    CO2 25 07/06/2021    BUN 33 (H) 07/06/2021    CREATININE 1 30 07/06/2021    CALCIUM 8 9 07/06/2021    GLUCOSE 103 11/16/2020   ]        INPATIENT MEDS:    Current Outpatient Medications:     acetaminophen (TYLENOL) 500 mg tablet, Take 1,000 mg by mouth every 6 (six) hours as needed for mild pain, Disp: , Rfl:     aspirin (ECOTRIN LOW STRENGTH) 81 mg EC tablet, Take 81 mg by mouth as needed, Disp: , Rfl:     aspirin 325 mg tablet, Take 325 mg by mouth as needed for mild pain, Disp: , Rfl:     atenolol (TENORMIN) 25 mg tablet, Take 0 5 tablets (12 5 mg total) by mouth daily, Disp: 45 tablet, Rfl: 1    bimatoprost (LUMIGAN) 0 01 % ophthalmic drops, Administer 1 drop to both eyes daily at bedtime, Disp: , Rfl:     irbesartan (AVAPRO) 150 mg tablet, Take 1 tablet (150 mg total) by mouth daily at bedtime, Disp: 90 tablet, Rfl: 1    multivitamin (THERAGRAN) TABS, Take 1 tablet by mouth daily, Disp: , Rfl:     simvastatin (ZOCOR) 20 mg tablet, One po daily, Disp: 90 tablet, Rfl: 1    clopidogrel (PLAVIX) 75 mg tablet, Take 1 tablet (75 mg total) by mouth daily (Patient not taking: Reported on 6/23/2021), Disp: 90 tablet, Rfl: 0      Physical Exam:   /80 (BP Location: Left arm, Patient Position: Sitting, Cuff Size: Adult)   Ht 5' 6" (1 676 m)   Wt 77 1 kg (170 lb)   BMI 27 44 kg/m²   GEN: no acute distress    RESP: breathing comfortably with no accessory muscle use    ABD: soft, non-tender, non-distended   INCISION:    EXT: no significant peripheral edema   (Male): Penis circumcised, phallus normal, meatus patent  Testicles descended into scrotum bilaterally without masses nor tenderness  No inguinal hernias bilaterally  ELISHA: Prostate is surgically absent with no rectal masses    RADIOLOGY:   None     Assessment:    1   Prostate cancer     Plan:   - follow-up in 1 year with PSA prior to visit  -  -  -

## 2021-10-08 DIAGNOSIS — E78.00 HYPERCHOLESTEREMIA: ICD-10-CM

## 2021-10-08 RX ORDER — SIMVASTATIN 20 MG
TABLET ORAL
Qty: 90 TABLET | Refills: 0 | Status: SHIPPED | OUTPATIENT
Start: 2021-10-08 | End: 2021-12-15 | Stop reason: SDUPTHER

## 2021-12-15 ENCOUNTER — OFFICE VISIT (OUTPATIENT)
Dept: FAMILY MEDICINE CLINIC | Facility: CLINIC | Age: 81
End: 2021-12-15
Payer: MEDICARE

## 2021-12-15 VITALS
SYSTOLIC BLOOD PRESSURE: 140 MMHG | BODY MASS INDEX: 26.2 KG/M2 | DIASTOLIC BLOOD PRESSURE: 78 MMHG | HEIGHT: 66 IN | HEART RATE: 61 BPM | WEIGHT: 163 LBS | TEMPERATURE: 97.6 F | OXYGEN SATURATION: 99 %

## 2021-12-15 DIAGNOSIS — E78.00 HYPERCHOLESTEREMIA: ICD-10-CM

## 2021-12-15 DIAGNOSIS — E55.9 VITAMIN D DEFICIENCY: ICD-10-CM

## 2021-12-15 DIAGNOSIS — Z00.00 MEDICARE ANNUAL WELLNESS VISIT, SUBSEQUENT: Primary | ICD-10-CM

## 2021-12-15 DIAGNOSIS — I10 ESSENTIAL HYPERTENSION: ICD-10-CM

## 2021-12-15 PROCEDURE — 1123F ACP DISCUSS/DSCN MKR DOCD: CPT | Performed by: FAMILY MEDICINE

## 2021-12-15 PROCEDURE — G0402 INITIAL PREVENTIVE EXAM: HCPCS | Performed by: FAMILY MEDICINE

## 2021-12-15 PROCEDURE — 99214 OFFICE O/P EST MOD 30 MIN: CPT | Performed by: FAMILY MEDICINE

## 2021-12-15 RX ORDER — DORZOLAMIDE HYDROCHLORIDE AND TIMOLOL MALEATE 20; 5 MG/ML; MG/ML
SOLUTION/ DROPS OPHTHALMIC
COMMUNITY
Start: 2021-11-09

## 2021-12-15 RX ORDER — SIMVASTATIN 20 MG
TABLET ORAL
Qty: 90 TABLET | Refills: 1 | Status: SHIPPED | OUTPATIENT
Start: 2021-12-15 | End: 2022-06-20 | Stop reason: SDUPTHER

## 2021-12-15 RX ORDER — LATANOPROST 50 UG/ML
SOLUTION/ DROPS OPHTHALMIC
COMMUNITY
Start: 2021-11-09

## 2021-12-15 RX ORDER — IRBESARTAN 150 MG/1
150 TABLET ORAL
Qty: 90 TABLET | Refills: 1 | Status: SHIPPED | OUTPATIENT
Start: 2021-12-15 | End: 2022-06-20 | Stop reason: DRUGHIGH

## 2021-12-15 RX ORDER — ATENOLOL 25 MG/1
12.5 TABLET ORAL DAILY
Qty: 45 TABLET | Refills: 1 | Status: SHIPPED | OUTPATIENT
Start: 2021-12-15 | End: 2022-06-20 | Stop reason: SDUPTHER

## 2021-12-27 ENCOUNTER — HOSPITAL ENCOUNTER (OUTPATIENT)
Dept: NON INVASIVE DIAGNOSTICS | Facility: CLINIC | Age: 81
Discharge: HOME | End: 2021-12-27
Payer: MEDICARE

## 2021-12-27 DIAGNOSIS — I73.9 PERIPHERAL ARTERIAL DISEASE (HCC): ICD-10-CM

## 2021-12-27 PROCEDURE — 93925 LOWER EXTREMITY STUDY: CPT | Performed by: SURGERY

## 2021-12-27 PROCEDURE — 93925 LOWER EXTREMITY STUDY: CPT

## 2021-12-27 PROCEDURE — 93922 UPR/L XTREMITY ART 2 LEVELS: CPT | Performed by: SURGERY

## 2021-12-27 PROCEDURE — 93923 UPR/LXTR ART STDY 3+ LVLS: CPT

## 2022-03-31 PROCEDURE — 88305 TISSUE EXAM BY PATHOLOGIST: CPT | Performed by: STUDENT IN AN ORGANIZED HEALTH CARE EDUCATION/TRAINING PROGRAM

## 2022-04-01 ENCOUNTER — LAB REQUISITION (OUTPATIENT)
Dept: LAB | Facility: HOSPITAL | Age: 82
End: 2022-04-01
Payer: MEDICARE

## 2022-04-01 DIAGNOSIS — D48.5 NEOPLASM OF UNCERTAIN BEHAVIOR OF SKIN: ICD-10-CM

## 2022-06-07 ENCOUNTER — APPOINTMENT (OUTPATIENT)
Dept: LAB | Facility: CLINIC | Age: 82
End: 2022-06-07
Payer: MEDICARE

## 2022-06-07 DIAGNOSIS — E55.9 VITAMIN D DEFICIENCY: ICD-10-CM

## 2022-06-07 DIAGNOSIS — I10 ESSENTIAL HYPERTENSION: ICD-10-CM

## 2022-06-07 DIAGNOSIS — E78.00 HYPERCHOLESTEREMIA: ICD-10-CM

## 2022-06-07 LAB
25(OH)D3 SERPL-MCNC: 46.7 NG/ML (ref 30–100)
ALBUMIN SERPL BCP-MCNC: 3.6 G/DL (ref 3.5–5)
ALP SERPL-CCNC: 91 U/L (ref 46–116)
ALT SERPL W P-5'-P-CCNC: 25 U/L (ref 12–78)
ANION GAP SERPL CALCULATED.3IONS-SCNC: 7 MMOL/L (ref 4–13)
AST SERPL W P-5'-P-CCNC: 17 U/L (ref 5–45)
BILIRUB DIRECT SERPL-MCNC: 0.1 MG/DL (ref 0–0.2)
BILIRUB SERPL-MCNC: 0.47 MG/DL (ref 0.2–1)
BUN SERPL-MCNC: 37 MG/DL (ref 5–25)
CALCIUM SERPL-MCNC: 8.9 MG/DL (ref 8.3–10.1)
CHLORIDE SERPL-SCNC: 109 MMOL/L (ref 100–108)
CHOLEST SERPL-MCNC: 146 MG/DL
CO2 SERPL-SCNC: 25 MMOL/L (ref 21–32)
CREAT SERPL-MCNC: 1.32 MG/DL (ref 0.6–1.3)
ERYTHROCYTE [DISTWIDTH] IN BLOOD BY AUTOMATED COUNT: 13.8 % (ref 11.6–15.1)
GFR SERPL CREATININE-BSD FRML MDRD: 49 ML/MIN/1.73SQ M
GLUCOSE P FAST SERPL-MCNC: 99 MG/DL (ref 65–99)
HCT VFR BLD AUTO: 46 % (ref 36.5–49.3)
HDLC SERPL-MCNC: 32 MG/DL
HGB BLD-MCNC: 15.2 G/DL (ref 12–17)
LDLC SERPL CALC-MCNC: 76 MG/DL (ref 0–100)
MCH RBC QN AUTO: 30.5 PG (ref 26.8–34.3)
MCHC RBC AUTO-ENTMCNC: 33 G/DL (ref 31.4–37.4)
MCV RBC AUTO: 92 FL (ref 82–98)
NONHDLC SERPL-MCNC: 114 MG/DL
PLATELET # BLD AUTO: 168 THOUSANDS/UL (ref 149–390)
PMV BLD AUTO: 10.8 FL (ref 8.9–12.7)
POTASSIUM SERPL-SCNC: 4.6 MMOL/L (ref 3.5–5.3)
PROT SERPL-MCNC: 7.5 G/DL (ref 6.4–8.2)
RBC # BLD AUTO: 4.99 MILLION/UL (ref 3.88–5.62)
SODIUM SERPL-SCNC: 141 MMOL/L (ref 136–145)
TRIGL SERPL-MCNC: 192 MG/DL
WBC # BLD AUTO: 11.35 THOUSAND/UL (ref 4.31–10.16)

## 2022-06-07 PROCEDURE — 80076 HEPATIC FUNCTION PANEL: CPT

## 2022-06-07 PROCEDURE — 36415 COLL VENOUS BLD VENIPUNCTURE: CPT

## 2022-06-07 PROCEDURE — 85027 COMPLETE CBC AUTOMATED: CPT

## 2022-06-07 PROCEDURE — 80061 LIPID PANEL: CPT

## 2022-06-07 PROCEDURE — 82306 VITAMIN D 25 HYDROXY: CPT

## 2022-06-07 PROCEDURE — 80048 BASIC METABOLIC PNL TOTAL CA: CPT

## 2022-06-20 ENCOUNTER — OFFICE VISIT (OUTPATIENT)
Dept: FAMILY MEDICINE CLINIC | Facility: CLINIC | Age: 82
End: 2022-06-20
Payer: MEDICARE

## 2022-06-20 VITALS
HEIGHT: 66 IN | DIASTOLIC BLOOD PRESSURE: 78 MMHG | WEIGHT: 168 LBS | BODY MASS INDEX: 27 KG/M2 | TEMPERATURE: 97.6 F | SYSTOLIC BLOOD PRESSURE: 142 MMHG | OXYGEN SATURATION: 98 % | HEART RATE: 63 BPM

## 2022-06-20 DIAGNOSIS — N18.31 STAGE 3A CHRONIC KIDNEY DISEASE (HCC): Primary | ICD-10-CM

## 2022-06-20 DIAGNOSIS — M21.42 FLAT FEET, BILATERAL: ICD-10-CM

## 2022-06-20 DIAGNOSIS — M21.41 FLAT FEET, BILATERAL: ICD-10-CM

## 2022-06-20 DIAGNOSIS — C61 PROSTATE CANCER (HCC): ICD-10-CM

## 2022-06-20 DIAGNOSIS — R79.9 ELEVATED BUN: ICD-10-CM

## 2022-06-20 DIAGNOSIS — E78.00 HYPERCHOLESTEREMIA: ICD-10-CM

## 2022-06-20 DIAGNOSIS — R79.89 ELEVATED SERUM CREATININE: ICD-10-CM

## 2022-06-20 DIAGNOSIS — I10 ESSENTIAL HYPERTENSION: ICD-10-CM

## 2022-06-20 PROCEDURE — 99215 OFFICE O/P EST HI 40 MIN: CPT | Performed by: FAMILY MEDICINE

## 2022-06-20 RX ORDER — SIMVASTATIN 20 MG
TABLET ORAL
Qty: 90 TABLET | Refills: 1 | Status: SHIPPED | OUTPATIENT
Start: 2022-06-20

## 2022-06-20 RX ORDER — ATENOLOL 25 MG/1
12.5 TABLET ORAL DAILY
Qty: 45 TABLET | Refills: 1 | Status: SHIPPED | OUTPATIENT
Start: 2022-06-20 | End: 2022-12-17

## 2022-06-20 RX ORDER — IRBESARTAN 75 MG/1
75 TABLET ORAL
Qty: 90 TABLET | Refills: 1 | Status: SHIPPED | OUTPATIENT
Start: 2022-06-20

## 2022-06-20 RX ORDER — AMLODIPINE BESYLATE 2.5 MG/1
2.5 TABLET ORAL DAILY
Qty: 90 TABLET | Refills: 1 | Status: SHIPPED | OUTPATIENT
Start: 2022-06-20

## 2022-06-20 NOTE — PROGRESS NOTES
Chief Complaint   Patient presents with    Follow-up     6 months/review BW     Assessment/Plan:  DC diet sodas  Increase water to at lease 32 oz a day or one liter  Continue with Tea after dinner  Inserted Power Steps  BMI Counseling: Body mass index is 27 12 kg/m²  The BMI is above normal  Nutrition recommendations include reducing portion sizes and moderation in carbohydrate intake  PHQ-2/9 Depression Screening    Little interest or pleasure in doing things: 0 - not at all  Feeling down, depressed, or hopeless: 0 - not at all  PHQ-2 Score: 0  PHQ-2 Interpretation: Negative depression screen          Diagnoses and all orders for this visit:    Stage 3a chronic kidney disease (HCC)    Hypercholesteremia  -     simvastatin (ZOCOR) 20 mg tablet; One po daily    Essential hypertension  -     irbesartan (AVAPRO) 75 mg tablet; Take 1 tablet (75 mg total) by mouth daily at bedtime  -     amLODIPine (NORVASC) 2 5 mg tablet; Take 1 tablet (2 5 mg total) by mouth daily  -     atenolol (TENORMIN) 25 mg tablet; Take 0 5 tablets (12 5 mg total) by mouth daily  -     Basic metabolic panel; Future    Prostate cancer (HCC)    Elevated serum creatinine    Elevated BUN    Flat feet, bilateral          Subjective:      Patient ID: Contreras Majano is a 79 y o  male  Refill Med's review labs  SH:  Drinks mostly diet soda, tea, some water  Takes daily walks, works in the yard  Bottom of feet start hurting  Labs: gradual increase in BUN and Creatinine  Will Decrease the ARB, add Amlodipine, and check the renal function in 3 months  Follows with urology and eye Dr for prostate CA and Glaucoma  The following portions of the patient's history were reviewed and updated as appropriate: allergies, current medications, past medical history, past social history, past surgical history and problem list     Review of Systems   Constitutional: Negative  HENT: Negative  Eyes: Negative  Respiratory: Negative  Cardiovascular: Negative  Gastrointestinal: Negative  Genitourinary: Negative  Musculoskeletal: Negative  Skin: Negative  Neurological: Negative  Psychiatric/Behavioral: Negative  Objective:      /78 (BP Location: Left arm, Patient Position: Sitting, Cuff Size: Standard)   Pulse 63   Temp 97 6 °F (36 4 °C) (Tympanic)   Ht 5' 6" (1 676 m)   Wt 76 2 kg (168 lb)   SpO2 98%   BMI 27 12 kg/m²       Current Outpatient Medications:     aspirin 325 mg tablet, Take 325 mg by mouth as needed for mild pain, Disp: , Rfl:     atenolol (TENORMIN) 25 mg tablet, Take 0 5 tablets (12 5 mg total) by mouth daily, Disp: 45 tablet, Rfl: 1    dorzolamide-timolol (COSOPT) 22 3-6 8 MG/ML ophthalmic solution, , Disp: , Rfl:     irbesartan (AVAPRO) 150 mg tablet, Take 1 tablet (150 mg total) by mouth daily at bedtime, Disp: 90 tablet, Rfl: 1    latanoprost (XALATAN) 0 005 % ophthalmic solution, , Disp: , Rfl:     multivitamin (THERAGRAN) TABS, Take 1 tablet by mouth daily, Disp: , Rfl:     simvastatin (ZOCOR) 20 mg tablet, One po daily, Disp: 90 tablet, Rfl: 1    aspirin (ECOTRIN LOW STRENGTH) 81 mg EC tablet, Take 81 mg by mouth as needed (Patient not taking: Reported on 6/20/2022), Disp: , Rfl:     bimatoprost (LUMIGAN) 0 01 % ophthalmic drops, Administer 1 drop to both eyes daily at bedtime (Patient not taking: Reported on 12/15/2021 ), Disp: , Rfl:   Allergies   Allergen Reactions    Other Itching and Rash     SESAME SEEDS,SOB     Past Surgical History:   Procedure Laterality Date    COLONOSCOPY      HERNIA REPAIR      IR AORTAGRAM WITH RUN-OFF  9/20/2019    IR AORTAGRAM WITH RUN-OFF  11/16/2020    [place] ENDOVENOUS LASER, 1ST VEIN Right 7/14/2020    Procedure: ENDOVASCULAR LASER THERAPY (EVLT);   Surgeon: Jose Arreola MD;  [facility];  Service: Vascular    MA REPAIR ING HERNIA,5+Y/O,REDUCIBL Right 12/7/2016    Procedure: REPAIR HERNIA INGUINAL; HYDROCELECTOMY;  Surgeon: Tabatha Schuster MD;  [facility];  Service: General    DC Haja Ridley 3RD+ ORD [place] [address] PEL/LXTR [facility] Right 11/16/2020    Procedure: ARTERIOGRAM, DIAGNOSTIC AORTO ILIAC, RIGHT LOWER EXTREMITY  ANGIOGRAM, ANGIOPLASTY AND STENTING OF SFA/POPLITEAL, ANGIOPLASTY OF ANTERIOTIBIAL/POPLITEAL AND ATHERECTOMY, ANGIOPLASTY OF TIBIOPERINEAL TRUNK, MYNX CLOSURE;  Surgeon: Bhavin Stanford MD;  [facility];  Service: Vascular    PROSTATE SURGERY  2014    PROSTATECTOMY RADICAL; LAST ASSESSED: 27OCT2015    SKIN CANCER EXCISION      TRANSURETHRAL RESECTION OF PROSTATE  2011    VASCULAR SURGERY            Physical Exam  Constitutional:       Appearance: He is well-developed  HENT:      Head: Normocephalic and atraumatic  Right Ear: Tympanic membrane, ear canal and external ear normal       Left Ear: Tympanic membrane, ear canal and external ear normal       Nose: Nose normal       Mouth/Throat:      Mouth: Mucous membranes are moist       Pharynx: Oropharynx is clear  Eyes:      Conjunctiva/sclera: Conjunctivae normal       Pupils: Pupils are equal, round, and reactive to light  Cardiovascular:      Rate and Rhythm: Normal rate and regular rhythm  Pulses: Normal pulses  Heart sounds: Normal heart sounds  Pulmonary:      Effort: Pulmonary effort is normal       Breath sounds: Normal breath sounds  Abdominal:      General: Abdomen is flat  Bowel sounds are normal       Palpations: Abdomen is soft  Musculoskeletal:      Cervical back: Normal range of motion and neck supple  Comments: Stand: BL flat feet  Skin:     General: Skin is warm and dry  Neurological:      General: No focal deficit present  Mental Status: He is alert and oriented to person, place, and time  Deep Tendon Reflexes: Reflexes are normal and symmetric  Psychiatric:         Mood and Affect: Mood normal          Behavior: Behavior normal          Thought Content:  Thought content normal  Judgment: Judgment normal

## 2022-07-13 ENCOUNTER — APPOINTMENT (OUTPATIENT)
Dept: LAB | Facility: CLINIC | Age: 82
End: 2022-07-13
Payer: MEDICARE

## 2022-07-13 DIAGNOSIS — C61 PROSTATE CANCER (HCC): ICD-10-CM

## 2022-07-13 LAB — PSA SERPL-MCNC: <0.1 NG/ML (ref 0–4)

## 2022-07-13 PROCEDURE — 84153 ASSAY OF PSA TOTAL: CPT

## 2022-07-20 ENCOUNTER — OFFICE VISIT (OUTPATIENT)
Dept: UROLOGY | Facility: CLINIC | Age: 82
End: 2022-07-20
Payer: MEDICARE

## 2022-07-20 VITALS
BODY MASS INDEX: 27 KG/M2 | WEIGHT: 168 LBS | OXYGEN SATURATION: 98 % | SYSTOLIC BLOOD PRESSURE: 120 MMHG | HEART RATE: 78 BPM | DIASTOLIC BLOOD PRESSURE: 80 MMHG | HEIGHT: 66 IN

## 2022-07-20 DIAGNOSIS — C61 PROSTATE CANCER (HCC): Primary | ICD-10-CM

## 2022-07-20 PROCEDURE — 99213 OFFICE O/P EST LOW 20 MIN: CPT | Performed by: PHYSICIAN ASSISTANT

## 2022-07-20 NOTE — PROGRESS NOTES
UROLOGY PROGRESS NOTE   Patient Identifiers: Lizette Abarca (MRN [id number])  Date of Service: 7/20/2022    Subjective:    24-year-old man with history of Christ 7 stage II prostate cancer  He had a robotic prostatectomy in 2014  His PSA remained< 0 1  He wears a pad but has fairly good urinary control  He has no other complaints  Reason for visit:  Prostate cancer follow-up    Objective:     VITALS:    There were no vitals filed for this visit          LABS:  Lab Results   Component Value Date    HGB 15 2 06/07/2022    HCT 46 0 06/07/2022    WBC 11 35 (H) 06/07/2022     06/07/2022   ]    Lab Results   Component Value Date     12/09/2016    K 4 6 06/07/2022     (H) 06/07/2022    CO2 25 06/07/2022    BUN 37 (H) 06/07/2022    CREATININE 1 32 (H) 06/07/2022    CALCIUM 8 9 06/07/2022    GLUCOSE 103 11/16/2020   ]        INPATIENT MEDS:    Current Outpatient Medications:     amLODIPine (NORVASC) 2 5 mg tablet, Take 1 tablet (2 5 mg total) by mouth daily, Disp: 90 tablet, Rfl: 1    aspirin (ECOTRIN LOW STRENGTH) 81 mg EC tablet, Take 81 mg by mouth as needed (Patient not taking: Reported on 6/20/2022), Disp: , Rfl:     aspirin 325 mg tablet, Take 325 mg by mouth as needed for mild pain, Disp: , Rfl:     atenolol (TENORMIN) 25 mg tablet, Take 0 5 tablets (12 5 mg total) by mouth daily, Disp: 45 tablet, Rfl: 1    bimatoprost (LUMIGAN) 0 01 % ophthalmic drops, Administer 1 drop to both eyes daily at bedtime (Patient not taking: Reported on 12/15/2021 ), Disp: , Rfl:     dorzolamide-timolol (COSOPT) 22 3-6 8 MG/ML ophthalmic solution, , Disp: , Rfl:     irbesartan (AVAPRO) 75 mg tablet, Take 1 tablet (75 mg total) by mouth daily at bedtime, Disp: 90 tablet, Rfl: 1    latanoprost (XALATAN) 0 005 % ophthalmic solution, , Disp: , Rfl:     multivitamin (THERAGRAN) TABS, Take 1 tablet by mouth daily, Disp: , Rfl:     simvastatin (ZOCOR) 20 mg tablet, One po daily, Disp: 90 tablet, Rfl: 1      Physical Exam:   There were no vitals taken for this visit  GEN: no acute distress    RESP: breathing comfortably with no accessory muscle use    ABD: soft, non-tender, non-distended   INCISION:    EXT: no significant peripheral edema   (Male): Penis circumcised, phallus normal, meatus patent  Testicles descended into scrotum bilaterally without masses nor tenderness  No inguinal hernias bilaterally  ELISHA: Prostate is absent with no rectal masses  RADIOLOGY:   None     Assessment:   1   Prostate cancer     Plan:   -follow-up in 1 year with PSA prior to visit  -  -  -

## 2022-09-20 ENCOUNTER — APPOINTMENT (OUTPATIENT)
Dept: LAB | Facility: CLINIC | Age: 82
End: 2022-09-20
Payer: MEDICARE

## 2022-09-20 DIAGNOSIS — I10 ESSENTIAL HYPERTENSION: ICD-10-CM

## 2022-09-20 LAB
ANION GAP SERPL CALCULATED.3IONS-SCNC: 6 MMOL/L (ref 4–13)
BUN SERPL-MCNC: 30 MG/DL (ref 5–25)
CALCIUM SERPL-MCNC: 8.7 MG/DL (ref 8.3–10.1)
CHLORIDE SERPL-SCNC: 107 MMOL/L (ref 96–108)
CO2 SERPL-SCNC: 25 MMOL/L (ref 21–32)
CREAT SERPL-MCNC: 1.34 MG/DL (ref 0.6–1.3)
GFR SERPL CREATININE-BSD FRML MDRD: 48 ML/MIN/1.73SQ M
GLUCOSE P FAST SERPL-MCNC: 99 MG/DL (ref 65–99)
POTASSIUM SERPL-SCNC: 4 MMOL/L (ref 3.5–5.3)
SODIUM SERPL-SCNC: 138 MMOL/L (ref 135–147)

## 2022-09-20 PROCEDURE — 80048 BASIC METABOLIC PNL TOTAL CA: CPT

## 2022-09-20 PROCEDURE — 36415 COLL VENOUS BLD VENIPUNCTURE: CPT

## 2022-09-26 ENCOUNTER — OFFICE VISIT (OUTPATIENT)
Dept: FAMILY MEDICINE CLINIC | Facility: CLINIC | Age: 82
End: 2022-09-26
Payer: MEDICARE

## 2022-09-26 VITALS
OXYGEN SATURATION: 97 % | WEIGHT: 166 LBS | BODY MASS INDEX: 26.68 KG/M2 | TEMPERATURE: 98.2 F | SYSTOLIC BLOOD PRESSURE: 130 MMHG | HEIGHT: 66 IN | HEART RATE: 60 BPM | DIASTOLIC BLOOD PRESSURE: 76 MMHG

## 2022-09-26 DIAGNOSIS — I10 ESSENTIAL HYPERTENSION: ICD-10-CM

## 2022-09-26 DIAGNOSIS — E78.00 HYPERCHOLESTEREMIA: ICD-10-CM

## 2022-09-26 DIAGNOSIS — I74.3 EMBOLISM AND THROMBOSIS OF ARTERIES OF THE LOWER EXTREMITIES (HCC): ICD-10-CM

## 2022-09-26 DIAGNOSIS — R00.1 SINUS BRADYCARDIA: Primary | ICD-10-CM

## 2022-09-26 DIAGNOSIS — N18.31 CHRONIC KIDNEY DISEASE (CKD) STAGE G3A/A1, MODERATELY DECREASED GLOMERULAR FILTRATION RATE (GFR) BETWEEN 45-59 ML/MIN/1.73 SQUARE METER AND ALBUMINURIA CREATININE RATIO LESS THAN 30 MG/G (HCC): ICD-10-CM

## 2022-09-26 DIAGNOSIS — L97.318 NON-PRESSURE CHRONIC ULCER OF RIGHT ANKLE WITH OTHER SPECIFIED SEVERITY (HCC): ICD-10-CM

## 2022-09-26 PROCEDURE — 93000 ELECTROCARDIOGRAM COMPLETE: CPT | Performed by: FAMILY MEDICINE

## 2022-09-26 PROCEDURE — 99215 OFFICE O/P EST HI 40 MIN: CPT | Performed by: FAMILY MEDICINE

## 2022-09-26 RX ORDER — SIMVASTATIN 20 MG
TABLET ORAL
Qty: 90 TABLET | Refills: 1 | Status: SHIPPED | OUTPATIENT
Start: 2022-09-26

## 2022-09-26 RX ORDER — IRBESARTAN 75 MG/1
75 TABLET ORAL
Qty: 90 TABLET | Refills: 1 | Status: CANCELLED | OUTPATIENT
Start: 2022-09-26

## 2022-09-26 RX ORDER — ATENOLOL 25 MG/1
12.5 TABLET ORAL DAILY
Qty: 45 TABLET | Refills: 1 | Status: CANCELLED | OUTPATIENT
Start: 2022-09-26 | End: 2023-03-25

## 2022-09-26 RX ORDER — AMLODIPINE BESYLATE 2.5 MG/1
2.5 TABLET ORAL DAILY
Qty: 90 TABLET | Refills: 1 | Status: SHIPPED | OUTPATIENT
Start: 2022-09-26

## 2022-09-26 NOTE — PROGRESS NOTES
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 ([address] )    5  Embolism and thrombosis of arteries of the lower extremities ([facility] [address] )    6  Chronic kidney disease (CKD) stage G3a/A1, moderately decreased glomerular filtration rate (GFR) between 45-59 mL/min/1 73 square meter and albuminuria creatinine ratio less than 30 mg/g ([facility])        Depression Screening and Follow-up Plan: Patient was screened for depression during today's encounter  They screened negative with a PHQ-2 score of 0  Subjective     Labs and refills  Pt concerned about the creatinine  Following with Dermatology  Elevated creatinine and decreased GFR  EKG: Bradycardia  Will hold the Avapro 75 mg and Atenolol 12 5 mg   I have spent 40 minutes with Patient and family today in which greater than 50% of this time was spent in counseling/coordination of care regarding Diagnostic results, Risks and benefits of tx options, Intructions for management, Patient and family education, Importance of tx compliance, Risk factor reductions and Impressions  Review of Systems   Constitutional: Negative  HENT: Negative  Eyes: Negative  Respiratory: Negative  Cardiovascular: Negative  Gastrointestinal: Negative  Genitourinary: Negative  Musculoskeletal: Negative  Skin: Negative  Neurological: Negative  Psychiatric/Behavioral: Negative          Past Medical History: Diagnosis Date    Basal cell carcinoma, ear, unspecified laterality     LAST ASSESSED: [id number]    Hyperlipidemia     Hypertension     Inguinal hernia     RESOLVED: [id number]    Malignant neoplasm of prostate ([address] )     RESOLVED: [id number]    Prostate enlargement     RESOLVED: [id number]    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, left     LAST ASSESSED: 27OCT2015     Past Surgical History:   Procedure Laterality Date    COLONOSCOPY      HERNIA REPAIR      IR AORTAGRAM WITH RUN-OFF  9/20/2019    IR AORTAGRAM WITH RUN-OFF  11/16/2020    [place] ENDOVENOUS LASER, 1ST VEIN Right 7/14/2020    Procedure: ENDOVASCULAR LASER THERAPY (EVLT);   Surgeon: Mame Seo MD;  [facility];  Service: Vascular    [place] REPAIR ING HERNIA,5+Y/O,REDUCIBL Right 12/7/2016    Procedure: REPAIR HERNIA INGUINAL; HYDROCELECTOMY;  Surgeon: Juana Selby MD;  [facility];  Service: General    [place] [address] 3RD+ ORD [place] PEL/TR [facility] Right 11/16/2020    Procedure: ARTERIOGRAM, DIAGNOSTIC AORTO ILIAC, RIGHT LOWER EXTREMITY  ANGIOGRAM, ANGIOPLASTY AND STENTING OF SFA/POPLITEAL, ANGIOPLASTY OF ANTERIOTIBIAL/POPLITEAL AND ATHERECTOMY, ANGIOPLASTY OF TIBIOPERINEAL TRUNK, MYNX CLOSURE;  Surgeon: Mame Seo MD;  [facility];  Service: Vascular    PROSTATE SURGERY  2014    PROSTATECTOMY RADICAL; LAST ASSESSED: 27OCT2015    SKIN CANCER EXCISION      TRANSURETHRAL RESECTION OF PROSTATE  2011    VASCULAR SURGERY       Family History   Problem Relation Age of Onset    Hypertension Mother     Cancer Father     Kidney failure Family      Social History     Socioeconomic History    Marital status: /Civil Union     Spouse name: None    Number of children: None    Years of education: None    Highest education level: None   Occupational History    Occupation: RETIRED      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    Tobacco Use    Smoking status: Never Smoker    Smokeless tobacco: Never Used   Vaping Use    Vaping Use: Never used   Substance and Sexual Activity    Alcohol use: No    Drug use: No    Sexual activity: None   Other Topics Concern    None   Social History Narrative    Always uses seat belt    No advance directives     Social Determinants of Health     Financial Resource Strain: Not on file   Food Insecurity: Not on file   Transportation Needs: Not on file   Physical Activity: Not on file   Stress: Not on file   Social Connections: Not on file   Intimate Partner Violence: Not on file   Housing Stability: Not on file     Current Outpatient Medications on File Prior to Visit   Medication Sig    dorzolamide-timolol (COSOPT) 22 3-6 8 MG/ML ophthalmic solution     latanoprost (XALATAN) 0 005 % ophthalmic solution     multivitamin (THERAGRAN) TABS Take 1 tablet by mouth daily    [DISCONTINUED] amLODIPine (NORVASC) 2 5 mg tablet Take 1 tablet (2 5 mg total) by mouth daily    [DISCONTINUED] atenolol (TENORMIN) 25 mg tablet Take 0 5 tablets (12 5 mg total) by mouth daily    [DISCONTINUED] irbesartan (AVAPRO) 75 mg tablet Take 1 tablet (75 mg total) by mouth daily at bedtime    [DISCONTINUED] simvastatin (ZOCOR) 20 mg tablet One po daily    aspirin (ECOTRIN LOW STRENGTH) 81 mg EC tablet Take 81 mg by mouth as needed (Patient not taking: No sig reported)    aspirin 325 mg tablet Take 325 mg by mouth as needed for mild pain    bimatoprost (LUMIGAN) 0 01 % ophthalmic drops Administer 1 drop to both eyes daily at bedtime (Patient not taking: No sig reported)     Allergies   Allergen Reactions    Other Itching and Rash     SESAME SEEDS,SOB     Immunization History   Administered Date(s) Administered    COVID-19 PFIZER VACCINE 0 3 ML IM 01/21/2021, 02/11/2021    H1N1, All Formulations 01/08/2010    INFLUENZA 09/23/2015, 10/05/2016, 10/09/2017, 10/15/2018    Influenza Split High Dose Preservative Free IM 09/23/2015, 10/05/2016    Influenza, seasonal, injectable 10/09/2017    Pneumococcal Conjugate 13-Valent 10/09/2017    Pneumococcal Polysaccharide PPV23 12/19/2018    Tdap 12/19/2018    influenza, trivalent, adjuvanted 10/08/2019       Objective     /76 (BP Location: Left arm, Patient Position: Sitting, Cuff Size: Standard)   Pulse 60   Temp 98 2 °F (36 8 °C) (Temporal)   Ht 5' 6" (1 676 m)   Wt 75 3 kg (166 lb)   SpO2 97%   BMI 26 79 kg/m²     Physical Exam  Constitutional:       Appearance: He is well-developed  HENT:      Head: Normocephalic and atraumatic  Right Ear: External ear normal       Left Ear: External ear normal       Nose: Nose normal    Eyes:      Conjunctiva/sclera: Conjunctivae normal       Pupils: Pupils are equal, round, and reactive to light  Cardiovascular:      Rate and Rhythm: Normal rate and regular rhythm  Heart sounds: Normal heart sounds  Pulmonary:      Effort: Pulmonary effort is normal       Breath sounds: Normal breath sounds  Abdominal:      General: Bowel sounds are normal       Palpations: Abdomen is soft  Musculoskeletal:      Cervical back: Normal range of motion and neck supple  Skin:     General: Skin is warm and dry  Neurological:      Mental Status: He is alert and oriented to person, place, and time  Deep Tendon Reflexes: Reflexes are normal and symmetric  Psychiatric:         Behavior: Behavior normal          Thought Content:  Thought content normal          Judgment: Judgment normal        Radha Rangel DO

## 2022-10-24 ENCOUNTER — OFFICE VISIT (OUTPATIENT)
Dept: FAMILY MEDICINE CLINIC | Facility: CLINIC | Age: 82
End: 2022-10-24

## 2022-10-24 VITALS
TEMPERATURE: 98.9 F | HEART RATE: 79 BPM | BODY MASS INDEX: 26.68 KG/M2 | WEIGHT: 166 LBS | SYSTOLIC BLOOD PRESSURE: 124 MMHG | DIASTOLIC BLOOD PRESSURE: 80 MMHG | OXYGEN SATURATION: 97 % | HEIGHT: 66 IN

## 2022-10-24 DIAGNOSIS — E78.00 HYPERCHOLESTEROLEMIA: ICD-10-CM

## 2022-10-24 DIAGNOSIS — I10 BENIGN ESSENTIAL HYPERTENSION: Primary | ICD-10-CM

## 2022-10-24 DIAGNOSIS — R79.89 ELEVATED SERUM CREATININE: ICD-10-CM

## 2022-10-24 DIAGNOSIS — N18.31 CHRONIC KIDNEY DISEASE (CKD) STAGE G3A/A1, MODERATELY DECREASED GLOMERULAR FILTRATION RATE (GFR) BETWEEN 45-59 ML/MIN/1.73 SQUARE METER AND ALBUMINURIA CREATININE RATIO LESS THAN 30 MG/G (HCC): ICD-10-CM

## 2022-10-24 NOTE — PROGRESS NOTES
Name: Jihan Serrano      : 1940      MRN: [id number]  Encounter Provider: Joyce Vela DO  Encounter Date: 10/24/2022   Encounter department: [facility]    Assessment & Plan     Continue the Amlodipine 2 5 mg, DC Avapro and Atenolol  Chief Complaint   Patient presents with   • Follow-up       1  Benign essential hypertension  -     Basic metabolic panel; Future    2  Hypercholesterolemia    3  Elevated serum creatinine  -     Basic metabolic panel; Future    4  Chronic kidney disease (CKD) stage G3a/A1, moderately decreased glomerular filtration rate (GFR) between 45-59 mL/min/1 73 square meter and albuminuria creatinine ratio less than 30 mg/g (HCC)  -     Basic metabolic panel; Future           Subjective     BP and elevated  Creatinine  Adjusting BP and trying to improve the renal function  Prior visit Avapro 75 mg and Atenolol 12 5 mg were held, amlodipine 2 5 mg was started  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  I have spent 30 minutes with Patient and family today in which greater than 50% of this time was spent in counseling/coordination of care regarding Diagnostic results, Risks and benefits of tx options, Intructions for management, Patient and family education, Importance of tx compliance, Risk factor reductions and Impressions  Review of Systems   Constitutional: Negative  HENT: Negative  Eyes: Negative  Respiratory: Negative  Cardiovascular: Negative  Gastrointestinal: Negative  Genitourinary: Negative  Musculoskeletal: Negative  Skin: Negative  Neurological: Negative  Psychiatric/Behavioral: Negative          Past Medical History:   Diagnosis Date   • Basal cell carcinoma, ear, unspecified laterality     LAST ASSESSED: [id number]   • Hyperlipidemia    • Hypertension    • Inguinal hernia     RESOLVED: [id number]   • Malignant neoplasm of prostate ([facility] [address] )     RESOLVED: [id number]   • Prostate enlargement RESOLVED: [id number]   • 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, left     LAST ASSESSED: 27OCT2015     Past Surgical History:   Procedure Laterality Date   • COLONOSCOPY     • HERNIA REPAIR     • IR AORTAGRAM WITH RUN-OFF  9/20/2019   • IR AORTAGRAM WITH RUN-OFF  11/16/2020   • [place] ENDOVENOUS LASER, 1ST VEIN Right 7/14/2020    Procedure: ENDOVASCULAR LASER THERAPY (EVLT);   Surgeon: Jillian Chiu MD;  [facility];  Service: Vascular   • [place] REPAIR ING HERNIA,5+Y/O,REDUCIBL Right 12/7/2016    Procedure: REPAIR HERNIA INGUINAL; HYDROCELECTOMY;  Surgeon: Shola Jaime MD;  [facility];  Service: General   • [place] Lise Recinos 3RD+ ORD [place] [address] PEL/TR [facility] Right 11/16/2020    Procedure: ARTERIOGRAM, DIAGNOSTIC AORTO ILIAC, RIGHT LOWER EXTREMITY  ANGIOGRAM, ANGIOPLASTY AND STENTING OF SFA/POPLITEAL, ANGIOPLASTY OF ANTERIOTIBIAL/POPLITEAL AND ATHERECTOMY, ANGIOPLASTY OF TIBIOPERINEAL TRUNK, MYNX CLOSURE;  Surgeon: Jillian Chiu MD;  [facility];  Service: Vascular   • PROSTATE SURGERY  2014    PROSTATECTOMY RADICAL; LAST ASSESSED: 27OCT2015   • SKIN CANCER EXCISION     • TRANSURETHRAL RESECTION OF PROSTATE  2011   • VASCULAR SURGERY       Family History   Problem Relation Age of Onset   • Hypertension Mother    • Cancer Father    • Kidney failure Family      Social History     Socioeconomic History   • Marital status: /Civil Union     Spouse name: None   • Number of children: None   • Years of education: None   • Highest education level: None   Occupational History   • Occupation: RETIRED      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    Tobacco Use   • Smoking status: Never Smoker   • Smokeless tobacco: Never Used   Vaping Use   • Vaping Use: Never used   Substance and Sexual Activity   • Alcohol use: No   • Drug use: No   • Sexual activity: None   Other Topics Concern   • None   Social History Narrative    Always uses seat belt    No advance directives     Social Determinants of Health     Financial Resource Strain: Not on file   Food Insecurity: Not on file   Transportation Needs: Not on file   Physical Activity: Not on file   Stress: Not on file   Social Connections: Not on file   Intimate Partner Violence: Not on file   Housing Stability: Not on file     Current Outpatient Medications on File Prior to Visit   Medication Sig   • amLODIPine (NORVASC) 2 5 mg tablet Take 1 tablet (2 5 mg total) by mouth daily   • aspirin 325 mg tablet Take 325 mg by mouth as needed for mild pain   • dorzolamide-timolol (COSOPT) 22 3-6 8 MG/ML ophthalmic solution    • latanoprost (XALATAN) 0 005 % ophthalmic solution    • multivitamin (THERAGRAN) TABS Take 1 tablet by mouth daily   • simvastatin (ZOCOR) 20 mg tablet One po daily   • aspirin (ECOTRIN LOW STRENGTH) 81 mg EC tablet Take 81 mg by mouth as needed (Patient not taking: No sig reported)   • bimatoprost (LUMIGAN) 0 01 % ophthalmic drops Administer 1 drop to both eyes daily at bedtime (Patient not taking: No sig reported)     Allergies   Allergen Reactions   • Other Itching and Rash     SESAME SEEDS,SOB     Immunization History   Administered Date(s) Administered   • COVID-19 PFIZER VACCINE 0 3 ML IM 01/21/2021, 02/11/2021   • H1N1, All Formulations 01/08/2010   • INFLUENZA 09/23/2015, 10/05/2016, 10/09/2017, 10/15/2018   • Influenza Split High Dose Preservative Free IM 09/23/2015, 10/05/2016   • Influenza, seasonal, injectable 10/09/2017   • Pneumococcal Conjugate 13-Valent 10/09/2017   • Pneumococcal Polysaccharide PPV23 12/19/2018   • Tdap 12/19/2018   • influenza, trivalent, adjuvanted 10/08/2019       Objective     /80 (BP Location: Right arm, Patient Position: Sitting, Cuff Size: Standard)   Pulse 79   Temp 98 9 °F (37 2 °C) (Temporal)   Ht 5' 6" (1 676 m)   Wt 75 3 kg (166 lb)   SpO2 97%   BMI 26 79 kg/m²     Physical Exam  Constitutional:       Appearance: He is well-developed     HENT:      Head: Normocephalic and atraumatic  Right Ear: External ear normal       Left Ear: External ear normal       Nose: Nose normal       Mouth/Throat:      Mouth: Mucous membranes are moist       Pharynx: Oropharynx is clear  Eyes:      Conjunctiva/sclera: Conjunctivae normal       Pupils: Pupils are equal, round, and reactive to light  Cardiovascular:      Rate and Rhythm: Normal rate and regular rhythm  Heart sounds: Normal heart sounds  Pulmonary:      Effort: Pulmonary effort is normal       Breath sounds: Normal breath sounds  Abdominal:      General: Abdomen is flat  Bowel sounds are normal       Palpations: Abdomen is soft  Musculoskeletal:      Cervical back: Normal range of motion and neck supple  Right lower leg: No edema  Left lower leg: No edema  Skin:     General: Skin is warm and dry  Neurological:      Mental Status: He is alert and oriented to person, place, and time  Deep Tendon Reflexes: Reflexes are normal and symmetric  Psychiatric:         Mood and Affect: Mood normal          Behavior: Behavior normal          Thought Content:  Thought content normal          Judgment: Judgment normal        María Burciaga DO

## 2022-11-28 ENCOUNTER — TRANSCRIBE ORDERS (OUTPATIENT)
Dept: VASCULAR SURGERY | Facility: CLINIC | Age: 82
End: 2022-11-28

## 2022-11-28 DIAGNOSIS — I73.9 PERIPHERAL ARTERIAL DISEASE (HCC): Primary | ICD-10-CM

## 2022-12-30 ENCOUNTER — HOSPITAL ENCOUNTER (OUTPATIENT)
Dept: NON INVASIVE DIAGNOSTICS | Facility: CLINIC | Age: 82
Discharge: HOME | End: 2022-12-30

## 2022-12-30 DIAGNOSIS — I73.9 PERIPHERAL ARTERIAL DISEASE (HCC): ICD-10-CM

## 2023-01-06 ENCOUNTER — TELEPHONE (OUTPATIENT)
Dept: VASCULAR SURGERY | Facility: CLINIC | Age: 83
End: 2023-01-06

## 2023-01-06 NOTE — TELEPHONE ENCOUNTER
----- Message from Husam Collins sent at 1/5/2023 11:49 AM EST -----    ----- Message -----  From: Adolfo Nickerson MD  Sent: 1/4/2023   7:40 AM EST  To: [facility] Consensus Pool    Please schedule OV f/u with AP or VS  ----- Message -----  From: Terrance Dolan  Sent: 1/3/2023  11:33 AM EST  To: Adolfo Nickerson MD    Task Study to:  Nahomi Morton MD   Signed by:  Nahomi Morton MD

## 2023-01-19 ENCOUNTER — APPOINTMENT (OUTPATIENT)
Dept: LAB | Facility: CLINIC | Age: 83
End: 2023-01-19

## 2023-01-19 DIAGNOSIS — I10 BENIGN ESSENTIAL HYPERTENSION: ICD-10-CM

## 2023-01-19 DIAGNOSIS — N18.31 CHRONIC KIDNEY DISEASE (CKD) STAGE G3A/A1, MODERATELY DECREASED GLOMERULAR FILTRATION RATE (GFR) BETWEEN 45-59 ML/MIN/1.73 SQUARE METER AND ALBUMINURIA CREATININE RATIO LESS THAN 30 MG/G (HCC): ICD-10-CM

## 2023-01-19 DIAGNOSIS — R79.89 ELEVATED SERUM CREATININE: ICD-10-CM

## 2023-01-19 LAB
ANION GAP SERPL CALCULATED.3IONS-SCNC: 5 MMOL/L (ref 4–13)
BUN SERPL-MCNC: 26 MG/DL (ref 5–25)
CALCIUM SERPL-MCNC: 9 MG/DL (ref 8.3–10.1)
CHLORIDE SERPL-SCNC: 103 MMOL/L (ref 96–108)
CO2 SERPL-SCNC: 28 MMOL/L (ref 21–32)
CREAT SERPL-MCNC: 1.15 MG/DL (ref 0.6–1.3)
GFR SERPL CREATININE-BSD FRML MDRD: 58 ML/MIN/1.73SQ M
GLUCOSE P FAST SERPL-MCNC: 109 MG/DL (ref 65–99)
POTASSIUM SERPL-SCNC: 4.2 MMOL/L (ref 3.5–5.3)
SODIUM SERPL-SCNC: 136 MMOL/L (ref 135–147)

## 2023-01-24 ENCOUNTER — OFFICE VISIT (OUTPATIENT)
Dept: FAMILY MEDICINE CLINIC | Facility: CLINIC | Age: 83
End: 2023-01-24

## 2023-01-24 VITALS
SYSTOLIC BLOOD PRESSURE: 138 MMHG | RESPIRATION RATE: 18 BRPM | DIASTOLIC BLOOD PRESSURE: 78 MMHG | HEIGHT: 66 IN | WEIGHT: 167.6 LBS | HEART RATE: 77 BPM | TEMPERATURE: 97.7 F | OXYGEN SATURATION: 100 % | BODY MASS INDEX: 26.93 KG/M2

## 2023-01-24 DIAGNOSIS — R42 LIGHTHEADEDNESS: ICD-10-CM

## 2023-01-24 DIAGNOSIS — Z00.00 MEDICARE ANNUAL WELLNESS VISIT, SUBSEQUENT: Primary | ICD-10-CM

## 2023-01-24 DIAGNOSIS — C61 PROSTATE CANCER (HCC): ICD-10-CM

## 2023-01-24 DIAGNOSIS — I73.9 PERIPHERAL VASCULAR DISEASE, UNSPECIFIED (HCC): ICD-10-CM

## 2023-01-24 DIAGNOSIS — N18.31 CHRONIC KIDNEY DISEASE (CKD) STAGE G3A/A1, MODERATELY DECREASED GLOMERULAR FILTRATION RATE (GFR) BETWEEN 45-59 ML/MIN/1.73 SQUARE METER AND ALBUMINURIA CREATININE RATIO LESS THAN 30 MG/G (HCC): ICD-10-CM

## 2023-01-24 RX ORDER — ASPIRIN 81 MG/1
TABLET ORAL
Start: 2023-01-24

## 2023-01-24 NOTE — PROGRESS NOTES
Assessment and Plan:     Problem List Items Addressed This Visit    None      Depression Screening and Follow-up Plan: Patient was screened for depression during today's encounter  They screened negative with a PHQ-2 score of 0  Preventive health issues were discussed with patient, and age appropriate screening tests were ordered as noted in patient's After Visit Summary  Personalized health advice and appropriate referrals for health education or preventive services given if needed, as noted in patient's After Visit Summary  History of Present Illness:     Patient presents for a Medicare Wellness Visit    Medicare PE  Getting HA's and dizziness on occasion since starting the Amlodipine  Patient Care Team:  Rima Landa DO as PCP - MD Carlos Rodgers MD (Inactive)  Rima Landa DO     Review of Systems:     Review of Systems   Constitutional: Negative  HENT: Negative  Eyes: Negative  Respiratory: Negative  Cardiovascular: Negative  Gastrointestinal: Negative  Genitourinary: Negative  Musculoskeletal: Negative  Skin: Negative  Neurological: Positive for dizziness and headaches  Psychiatric/Behavioral: Negative           Problem List:     Patient Active Problem List   Diagnosis   • Inguinal hernia of right side with obstruction   • Benign essential hypertension   • Prostate cancer ([facility] [address] )   • Hypercholesterolemia   • Peripheral vascular disease, unspecified ([facility] [address] )   • Chronic venous insufficiency   • Carotid stenosis, asymptomatic   • Stage 3a chronic kidney disease (HCC)   • 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 ([facility] [address] )   • Embolism and thrombosis of arteries of the lower extremities [facility])      Past Medical and Surgical History:     Past Medical History:   Diagnosis Date   • Basal cell carcinoma, ear, unspecified laterality     LAST ASSESSED: [id number]   • Hyperlipidemia    • Hypertension    • Inguinal hernia RESOLVED: [id number]   • Malignant neoplasm of prostate ([address] )     RESOLVED: [id number]   • Prostate enlargement     RESOLVED: [id number]   • 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, left     LAST ASSESSED: 27OCT2015     Past Surgical History:   Procedure Laterality Date   • COLONOSCOPY     • HERNIA REPAIR     • IR AORTAGRAM WITH RUN-OFF  9/20/2019   • IR AORTAGRAM WITH RUN-OFF  11/16/2020   • OK ENDOVEN ABLTJ INCMPTNT VEIN XTR LASER 1ST VEIN Right 7/14/2020    Procedure: ENDOVASCULAR LASER THERAPY (EVLT); Surgeon: Candida Streeter MD;  [facility];  Service: Vascular   • OK RPR 1ST INGUN HRNA AGE 5 YRS/> REDUCIBLE Right 12/7/2016    Procedure: REPAIR HERNIA INGUINAL; HYDROCELECTOMY;  Surgeon: Tawnada Lynn MD;  [facility];  Service: General   • OK Jeffrey Imam 3RD+ ORD [place] 89 PEL/LXTR [facility] Right 11/16/2020    Procedure: ARTERIOGRAM, DIAGNOSTIC AORTO ILIAC, RIGHT LOWER EXTREMITY  ANGIOGRAM, ANGIOPLASTY AND STENTING OF SFA/POPLITEAL, ANGIOPLASTY OF ANTERIOTIBIAL/POPLITEAL AND ATHERECTOMY, ANGIOPLASTY OF TIBIOPERINEAL TRUNK, MYNX CLOSURE;  Surgeon: Candida Streeter MD;  [facility];  Service: Vascular   • PROSTATE SURGERY  2014    PROSTATECTOMY RADICAL; LAST ASSESSED: 27OCT2015   • SKIN CANCER EXCISION     • TRANSURETHRAL RESECTION OF PROSTATE  2011   • VASCULAR SURGERY        Family History:     Family History   Problem Relation Age of Onset   • Hypertension Mother    • Cancer Father    • Kidney failure Family       Social History:     Social History     Socioeconomic History   • Marital status: /Civil Union     Spouse name: None   • Number of children: None   • Years of education: None   • Highest education level: None   Occupational History   • Occupation: RETIRED      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    Tobacco Use   • Smoking status: Never   • Smokeless tobacco: Never   Vaping Use   • Vaping Use: Never used   Substance and Sexual Activity   • Alcohol use:  No • Drug use: No   • Sexual activity: None   Other Topics Concern   • None   Social History Narrative    Always uses seat belt    No advance directives     Social Determinants of Health     Financial Resource Strain: Not on file   Food Insecurity: Not on file   Transportation Needs: Not on file   Physical Activity: Not on file   Stress: Not on file   Social Connections: Not on file   Intimate Partner Violence: Not on file   Housing Stability: Not on file      Medications and Allergies:     Current Outpatient Medications   Medication Sig Dispense Refill   • amLODIPine (NORVASC) 2 5 mg tablet Take 1 tablet (2 5 mg total) by mouth daily 90 tablet 1   • bimatoprost (LUMIGAN) 0 01 % ophthalmic drops Administer 1 drop to both eyes daily at bedtime     • dorzolamide-timolol (COSOPT) 22 3-6 8 MG/ML ophthalmic solution      • latanoprost (XALATAN) 0 005 % ophthalmic solution      • multivitamin (THERAGRAN) TABS Take 1 tablet by mouth daily     • simvastatin (ZOCOR) 20 mg tablet One po daily 90 tablet 1   • aspirin (ECOTRIN LOW STRENGTH) 81 mg EC tablet Take 81 mg by mouth as needed (Patient not taking: Reported on 1/24/2023)     • aspirin 325 mg tablet Take 325 mg by mouth as needed for mild pain (Patient not taking: Reported on 1/24/2023)       No current facility-administered medications for this visit       Allergies   Allergen Reactions   • Other Itching and Rash     SESAME SEEDS,SOB      Immunizations:     Immunization History   Administered Date(s) Administered   • COVID-19 PFIZER VACCINE 0 3 ML IM 01/21/2021, 02/11/2021   • H1N1, All Formulations 01/08/2010   • INFLUENZA 09/23/2015, 10/05/2016, 10/09/2017, 10/15/2018   • Influenza Split High Dose Preservative Free IM 09/23/2015, 10/05/2016   • Influenza, seasonal, injectable 10/09/2017   • Pneumococcal Conjugate 13-Valent 10/09/2017   • Pneumococcal Polysaccharide PPV23 12/19/2018   • Tdap 12/19/2018   • influenza, trivalent, adjuvanted 10/08/2019      Health Maintenance:         Topic Date Due   • Colorectal Cancer Screening  Never done   • Hepatitis C Screening  Completed         Topic Date Due   • COVID-19 Vaccine (3 - Booster for Pfizer series) 04/08/2021   • Influenza Vaccine (1) 09/01/2022      Medicare Screening Tests and Risk Assessments:     Miguelangel Barba is here for his Subsequent Wellness visit  Health Risk Assessment:   Patient rates overall health as very good  Patient feels that their physical health rating is same  Patient is very satisfied with their life  Eyesight was rated as same  Hearing was rated as same  Patient feels that their emotional and mental health rating is same  Patients states they are never, rarely angry  Patient states they are never, rarely unusually tired/fatigued  Pain experienced in the last 7 days has been none  Patient states that he has experienced no weight loss or gain in last 6 months  Fall Risk Screening: In the past year, patient has experienced: no history of falling in past year      Home Safety:  Patient does not have trouble with stairs inside or outside of their home  Patient has working smoke alarms and has working carbon monoxide detector  Home safety hazards include: none  Nutrition:   Current diet is Regular  Medications:   Patient is currently taking over-the-counter supplements  OTC medications include: see medication list  Patient is able to manage medications  Activities of Daily Living (ADLs)/Instrumental Activities of Daily Living (IADLs):   Walk and transfer into and out of bed and chair?: Yes  Dress and groom yourself?: Yes    Bathe or shower yourself?: Yes    Feed yourself?  Yes  Do your laundry/housekeeping?: Yes  Manage your money, pay your bills and track your expenses?: Yes  Make your own meals?: Yes    Do your own shopping?: Yes    Previous Hospitalizations:   Any hospitalizations or ED visits within the last 12 months?: No      Advance Care Planning:   Living will: No    Durable POA for healthcare: No    Advanced directive: No    Advanced directive counseling given: Yes    Five wishes given: Yes    End of Life Decisions reviewed with patient: Yes      Cognitive Screening:   Provider or family/friend/caregiver concerned regarding cognition?: No    PREVENTIVE SCREENINGS      Cardiovascular Screening:    General: Screening Not Indicated and History Lipid Disorder      Diabetes Screening:     General: Screening Current      Colorectal Cancer Screening:     General: Screening Not Indicated      Prostate Cancer Screening:    General: History Prostate Cancer and Screening Not Indicated      Osteoporosis Screening:    General: Screening Not Indicated      Abdominal Aortic Aneurysm (AAA) Screening:        General: Screening Not Indicated      Lung Cancer Screening:     General: Screening Not Indicated      Hepatitis C Screening:    General: Screening Current    Screening, Brief Intervention, and Referral to Treatment (SBIRT)    Screening  Typical number of drinks in a day: 0  Typical number of drinks in a week: 1  Interpretation: Low risk drinking behavior  AUDIT-C Screenin) How often did you have a drink containing alcohol in the past year? monthly or less  2) How many drinks did you have on a typical day when you were drinking in the past year? 1 to 2  3) How often did you have 6 or more drinks on one occasion in the past year? never    AUDIT-C Score: 1  Interpretation: Score 0-3 (male): Negative screen for alcohol misuse    Single Item Drug Screening:  How often have you used an illegal drug (including marijuana) or a prescription medication for non-medical reasons in the past year? never    Single Item Drug Screen Score: 0  Interpretation: Negative screen for possible drug use disorder    Other Counseling Topics:   Regular weightbearing exercise and calcium and vitamin D intake  No results found       Physical Exam:     /78 (BP Location: Left arm, Patient Position: Sitting, Cuff Size: Standard)   Pulse 77   Temp 97 7 °F (36 5 °C) (Temporal)   Resp 18   Ht 5' 6" (1 676 m)   Wt 76 kg (167 lb 9 6 oz)   SpO2 100%   BMI 27 05 kg/m²     Physical Exam  Vitals and nursing note reviewed  Constitutional:       General: He is not in acute distress  Appearance: He is well-developed  HENT:      Head: Normocephalic and atraumatic  Eyes:      Conjunctiva/sclera: Conjunctivae normal    Cardiovascular:      Rate and Rhythm: Normal rate and regular rhythm  Heart sounds: No murmur heard  Pulmonary:      Effort: Pulmonary effort is normal  No respiratory distress  Breath sounds: Normal breath sounds  Abdominal:      Palpations: Abdomen is soft  Tenderness: There is no abdominal tenderness  Musculoskeletal:         General: No swelling  Cervical back: Neck supple  Skin:     General: Skin is warm and dry  Capillary Refill: Capillary refill takes less than 2 seconds  Neurological:      Mental Status: He is alert     Psychiatric:         Mood and Affect: Mood normal           Justin Kate DO

## 2023-01-24 NOTE — PROGRESS NOTES
Name: Tamie Chin      : 1940      MRN: [id number]  Encounter Provider: Reyes Del Rio DO  Encounter Date: 2023   Encounter department: [facility]    Assessment & Plan     Try the aspirin 81 mg 2X a week  Chief Complaint   Patient presents with   • Medicare Wellness Visit   • Hypertension     1  Medicare annual wellness visit, subsequent    2  Peripheral vascular disease, unspecified (HCC)  -     aspirin (ECOTRIN LOW STRENGTH) 81 mg EC tablet; opne po 2X a week, eg Monday and Friday  3  Prostate cancer ([facility] [address] )    4  Chronic kidney disease (CKD) stage G3a/A1, moderately decreased glomerular filtration rate (GFR) between 45-59 mL/min/1 73 square meter and albuminuria creatinine ratio less than 30 mg/g (HCC)    5  Lightheadedness           Subjective     Review labs  CM: Amlodipine 2 5 mg  Pt reports increased incidences of HA's and lightheaded episodes since starting the Amlodipine 2 5 mg  Had stopped the aspirin 81 mg - bleeding a lot with small cuts  BP by me: L- 115/68, R- 120/60  I have spent 30 minutes with Patient and family today in which greater than 50% of this time was spent in counseling/coordination of care regarding Diagnostic results, Risks and benefits of tx options, Intructions for management, Patient and family education, Importance of tx compliance, Risk factor reductions and Impressions  Review of Systems   Constitutional: Negative  HENT: Negative  Eyes: Negative  Respiratory: Negative  Cardiovascular: Negative  Gastrointestinal: Negative  Genitourinary: Negative  Musculoskeletal: Negative  Skin: Negative  Neurological: Positive for dizziness and headaches  Lightheaded in am when first getting up  Psychiatric/Behavioral: Negative          Past Medical History:   Diagnosis Date   • Basal cell carcinoma, ear, unspecified laterality     LAST ASSESSED: [id number]   • Hyperlipidemia    • Hypertension    • Inguinal hernia     RESOLVED: [id number]   • Malignant neoplasm of prostate ([address] )     RESOLVED: [id number]   • Prostate enlargement     RESOLVED: [id number]   • 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, left     LAST ASSESSED: 27OCT2015     Past Surgical History:   Procedure Laterality Date   • COLONOSCOPY     • HERNIA REPAIR     • IR AORTAGRAM WITH RUN-OFF  9/20/2019   • IR AORTAGRAM WITH RUN-OFF  11/16/2020   • AL ENDOVEN ABLTJ INCMPTNT VEIN XTR LASER 1ST VEIN Right 7/14/2020    Procedure: ENDOVASCULAR LASER THERAPY (EVLT);   Surgeon: Rob Argueta MD;  [facility];  Service: Vascular   • AL RPR 1ST INGUN HRNA AGE 5 YRS/> REDUCIBLE Right 12/7/2016    Procedure: REPAIR HERNIA INGUINAL; HYDROCELECTOMY;  Surgeon: Pamela Cam MD;  [facility];  Service: General   • AL [place] Eric 3RD+ ORD [place] [address] PEL/LXTR [facility] Right 11/16/2020    Procedure: ARTERIOGRAM, DIAGNOSTIC AORTO ILIAC, RIGHT LOWER EXTREMITY  ANGIOGRAM, ANGIOPLASTY AND STENTING OF SFA/POPLITEAL, ANGIOPLASTY OF ANTERIOTIBIAL/POPLITEAL AND ATHERECTOMY, ANGIOPLASTY OF TIBIOPERINEAL TRUNK, MYNX CLOSURE;  Surgeon: Rob Argueta MD;  [facility];  Service: Vascular   • PROSTATE SURGERY  2014    PROSTATECTOMY RADICAL; LAST ASSESSED: 27OCT2015   • SKIN CANCER EXCISION     • TRANSURETHRAL RESECTION OF PROSTATE  2011   • VASCULAR SURGERY       Family History   Problem Relation Age of Onset   • Hypertension Mother    • Cancer Father    • Kidney failure Family      Social History     Socioeconomic History   • Marital status: /Civil Union     Spouse name: None   • Number of children: None   • Years of education: None   • Highest education level: None   Occupational History   • Occupation: RETIRED      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    Tobacco Use   • Smoking status: Never   • Smokeless tobacco: Never   Vaping Use   • Vaping Use: Never used   Substance and Sexual Activity   • Alcohol use: No   • Drug use: No   • Sexual activity: None   Other Topics Concern   • None   Social History Narrative    Always uses seat belt    No advance directives     Social Determinants of Health     Financial Resource Strain: Low Risk    • Difficulty of Paying Living Expenses: Not hard at all   Food Insecurity: Not on file   Transportation Needs: No Transportation Needs   • Lack of Transportation (Medical): No   • Lack of Transportation (Non-Medical):  No   Physical Activity: Not on file   Stress: Not on file   Social Connections: Not on file   Intimate Partner Violence: Not on file   Housing Stability: Not on file     Current Outpatient Medications on File Prior to Visit   Medication Sig   • bimatoprost (LUMIGAN) 0 01 % ophthalmic drops Administer 1 drop to both eyes daily at bedtime   • dorzolamide-timolol (COSOPT) 22 3-6 8 MG/ML ophthalmic solution    • latanoprost (XALATAN) 0 005 % ophthalmic solution    • multivitamin (THERAGRAN) TABS Take 1 tablet by mouth daily   • simvastatin (ZOCOR) 20 mg tablet One po daily   • [DISCONTINUED] amLODIPine (NORVASC) 2 5 mg tablet Take 1 tablet (2 5 mg total) by mouth daily   • [DISCONTINUED] aspirin (ECOTRIN LOW STRENGTH) 81 mg EC tablet Take 81 mg by mouth as needed (Patient not taking: Reported on 1/24/2023)   • [DISCONTINUED] aspirin 325 mg tablet Take 325 mg by mouth as needed for mild pain (Patient not taking: Reported on 1/24/2023)     Allergies   Allergen Reactions   • Other Itching and Rash     SESAME SEEDS,SOB     Immunization History   Administered Date(s) Administered   • COVID-19 PFIZER VACCINE 0 3 ML IM 01/21/2021, 02/11/2021   • H1N1, All Formulations 01/08/2010   • INFLUENZA 09/23/2015, 10/05/2016, 10/09/2017, 10/15/2018   • Influenza Split High Dose Preservative Free IM 09/23/2015, 10/05/2016   • Influenza, seasonal, injectable 10/09/2017   • Pneumococcal Conjugate 13-Valent 10/09/2017   • Pneumococcal Polysaccharide PPV23 12/19/2018   • Tdap 12/19/2018   • influenza, trivalent, adjuvanted 10/08/2019       Objective     /78 (BP Location: Left arm, Patient Position: Sitting, Cuff Size: Standard)   Pulse 77   Temp 97 7 °F (36 5 °C) (Temporal)   Resp 18   Ht 5' 6" (1 676 m)   Wt 76 kg (167 lb 9 6 oz)   SpO2 100%   BMI 27 05 kg/m²     Physical Exam  Constitutional:       Appearance: He is well-developed  HENT:      Head: Normocephalic and atraumatic  Right Ear: External ear normal       Left Ear: External ear normal       Nose: Nose normal       Mouth/Throat:      Mouth: Mucous membranes are moist       Pharynx: Oropharynx is clear  Eyes:      Conjunctiva/sclera: Conjunctivae normal       Pupils: Pupils are equal, round, and reactive to light  Cardiovascular:      Rate and Rhythm: Normal rate and regular rhythm  Heart sounds: Normal heart sounds  Pulmonary:      Effort: Pulmonary effort is normal       Breath sounds: Normal breath sounds  Musculoskeletal:      Cervical back: Normal range of motion and neck supple  Skin:     General: Skin is warm and dry  Neurological:      Mental Status: He is alert and oriented to person, place, and time  Deep Tendon Reflexes: Reflexes are normal and symmetric  Psychiatric:         Mood and Affect: Mood normal          Behavior: Behavior normal          Thought Content:  Thought content normal          Judgment: Judgment normal        Neha Cameron DO

## 2023-01-30 ENCOUNTER — TELEPHONE (OUTPATIENT)
Dept: FAMILY MEDICINE CLINIC | Facility: CLINIC | Age: 83
End: 2023-01-30

## 2023-01-30 NOTE — TELEPHONE ENCOUNTER
----- Message from Carla Claudio DO sent at 1/20/2023 12:34 PM EST -----  Kidney function improving  ----- Message -----  From: Lab, Background User  Sent: 1/19/2023   4:42 PM EST  To: Carla Claudio DO

## 2023-02-08 ENCOUNTER — OFFICE VISIT (OUTPATIENT)
Dept: VASCULAR SURGERY | Facility: CLINIC | Age: 83
End: 2023-02-08

## 2023-02-08 VITALS
DIASTOLIC BLOOD PRESSURE: 82 MMHG | WEIGHT: 165 LBS | HEART RATE: 82 BPM | SYSTOLIC BLOOD PRESSURE: 130 MMHG | BODY MASS INDEX: 26.52 KG/M2 | HEIGHT: 66 IN

## 2023-02-08 DIAGNOSIS — I73.9 PERIPHERAL VASCULAR DISEASE, UNSPECIFIED (HCC): Primary | ICD-10-CM

## 2023-02-08 NOTE — ASSESSMENT & PLAN NOTE
24-year-old male with a history of PAD and right lower extremity angiogram with SFA stenting and balloon angioplasty of the popliteal artery in 2019    Patient clinically is doing well with no concerns regarding claudication rest pain or nonhealing wounds  He has a palpable dorsalis pedis  The patient's arterial duplex shows     RLE JANICE 0 73 (Prior: 0 93); Metatarsal pressure of 62 mmHg, (Prior: 68 mmHg); Great toe pressure of 62 mmHg (Prior: 57mmHg)    LLE JANICE NC; Metatarsal pressure of 73 mmHg, (Prior: 75 mmHg); Great toe pressure of 42 mmHg (Prior: 40 mmHg)  I noticed his duplex that showed increasing velocities within the in-stent stenosis starting June of 2021 with PSV of 184 to 248 in Dec 2021  It's now 330 on his most recent duplex  I am worried about an impending occlusion    I discussed with the patient we will repeat his duplex in 6 months and if it shows worsening velocities we may be planning for an arteriogram for stent maintenance at that time

## 2023-02-08 NOTE — PROGRESS NOTES
Assessment/Plan:    Peripheral vascular disease, unspecified [facility])  53-year-old male with a history of PAD and right lower extremity angiogram with SFA stenting and balloon angioplasty of the popliteal artery in 2019    Patient clinically is doing well with no concerns regarding claudication rest pain or nonhealing wounds  He has a palpable dorsalis pedis  The patient's arterial duplex shows     RLE JANICE 0 73 (Prior: 0 93); Metatarsal pressure of 62 mmHg, (Prior: 68 mmHg); Great toe pressure of 62 mmHg (Prior: 57mmHg)    LLE JANICE NC; Metatarsal pressure of 73 mmHg, (Prior: 75 mmHg); Great toe pressure of 42 mmHg (Prior: 40 mmHg)  I noticed his duplex that showed increasing velocities within the in-stent stenosis starting June of 2021 with PSV of 184 to 248 in Dec 2021  It's now 330 on his most recent duplex  I am worried about an impending occlusion  I discussed with the patient we will repeat his duplex in 6 months and if it shows worsening velocities we may be planning for an arteriogram for stent maintenance at that time       Diagnoses and all orders for this visit:    Peripheral vascular disease, unspecified ([facility] [address] )  -     VAS lower limb arterial duplex, complete bilateral; Future        Subjective:      Patient ID: Cynthia Benitez is a 79 y o  male  Pt is here to rev CARSON 12/30/22  Pt denies claudication symptoms  Pt is taking ASA and Simvastatin  HPI   53-year-old male with a history of PAD and right lower extremity angiogram with SFA stenting and balloon angioplasty of the popliteal artery in 2019 coming for his routine checkup  Overall the patient states he has had no pain in his legs other than some mild cramping in his left calf when walking uphill or upstairs  He has been walking more more lately and has had no concerns for wounds that are nonhealing  The patient is currently taking his aspirin and statin as prescribed  He does have some irritation on his left anterior lower leg    He states he has had some dry skin in that area and may have been irritating it while scrubbing it during his showers  The following portions of the patient's history were reviewed and updated as appropriate: allergies, current medications, past family history, past medical history, past social history, past surgical history and problem list     I have spent 25 minutes with Patient and family today in which greater than 50% of this time was spent in counseling/coordination of care regarding Diagnostic results, Prognosis, Risks and benefits of tx options, Intructions for management, Patient and family education, Importance of tx compliance, Risk factor reductions and Impressions  Review of Systems   Constitutional: Negative  HENT: Negative  Eyes: Negative  Respiratory: Negative  Cardiovascular: Negative  Gastrointestinal: Negative  Endocrine: Negative  Genitourinary: Negative  Musculoskeletal: Negative  Skin: Negative  Allergic/Immunologic: Negative  Neurological: Negative  Hematological: Negative  Psychiatric/Behavioral: Negative  Objective:      /82 (BP Location: Right arm, Patient Position: Sitting, Cuff Size: Standard)   Pulse 82   Ht 5' 6" (1 676 m)   Wt 74 8 kg (165 lb)   BMI 26 63 kg/m²          Physical Exam  Constitutional:       Appearance: Normal appearance  HENT:      Head: Normocephalic and atraumatic  Eyes:      Extraocular Movements: Extraocular movements intact  Pupils: Pupils are equal, round, and reactive to light  Cardiovascular:      Rate and Rhythm: Normal rate and regular rhythm  Pulses:           Dorsalis pedis pulses are 1+ on the right side and detected w/ Doppler on the left side  Pulmonary:      Effort: Pulmonary effort is normal       Breath sounds: Normal breath sounds  Abdominal:      Palpations: Abdomen is soft  Tenderness: There is no abdominal tenderness     Musculoskeletal:         General: Normal range of motion  Cervical back: Normal range of motion  Skin:     General: Skin is dry  Findings: Rash (anterior left lower leg) present  Neurological:      General: No focal deficit present  Mental Status: He is oriented to person, place, and time     Psychiatric:         Mood and Affect: Mood normal

## 2023-02-21 ENCOUNTER — OFFICE VISIT (OUTPATIENT)
Dept: FAMILY MEDICINE CLINIC | Facility: CLINIC | Age: 83
End: 2023-02-21

## 2023-02-21 VITALS
WEIGHT: 165 LBS | HEART RATE: 76 BPM | DIASTOLIC BLOOD PRESSURE: 78 MMHG | OXYGEN SATURATION: 98 % | TEMPERATURE: 97.7 F | SYSTOLIC BLOOD PRESSURE: 136 MMHG | BODY MASS INDEX: 26.52 KG/M2 | HEIGHT: 66 IN

## 2023-02-21 DIAGNOSIS — I10 BENIGN ESSENTIAL HYPERTENSION: Primary | ICD-10-CM

## 2023-02-21 NOTE — PROGRESS NOTES
Name: Bushra Goddard      : 1940      MRN: [id number]  Encounter Provider: Concepicon Lay DO  Encounter Date: 2023   Encounter department: [address]     Chief Complaint   Patient presents with   • Follow-up   • Hypertension     BMI Counseling: Body mass index is 26 63 kg/m²  The BMI is above normal  Nutrition recommendations include reducing portion sizes and moderation in carbohydrate intake  PHQ-2/9 Depression Screening    Little interest or pleasure in doing things: 0 - not at all  Feeling down, depressed, or hopeless: 0 - not at all  PHQ-2 Score: 0  PHQ-2 Interpretation: Negative depression screen           Subjective     BP controlled, off the Avapro and Amlodipine  Retired past 2 years - less stress  Continues with writing a physics book  Walking daily- weather permitting  Review of Systems   Constitutional: Negative  HENT: Negative  Eyes: Negative  Respiratory: Negative  Cardiovascular: Negative  Gastrointestinal: Negative  Genitourinary: Negative  Musculoskeletal: Negative  Skin: Negative  Neurological: Negative  Psychiatric/Behavioral: Negative  Past Medical History:   Diagnosis Date   • Basal cell carcinoma, ear, unspecified laterality     LAST ASSESSED: 2015   • Hyperlipidemia    • Hypertension    • Inguinal hernia     RESOLVED: [id number]   • Malignant neoplasm of prostate ([facility] [address] )     RESOLVED: [id number]   • Prostate enlargement     RESOLVED: [id number]   • 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, left     LAST ASSESSED: 2015     Past Surgical History:   Procedure Laterality Date   • COLONOSCOPY     • HERNIA REPAIR     • IR AORTAGRAM WITH RUN-OFF  2019   • IR AORTAGRAM WITH RUN-OFF  2020   • [place] ENDOVEN ABLTJ INCMPTNT VEIN XTR LASER 1ST VEIN Right 2020    Procedure: ENDOVASCULAR LASER THERAPY (EVLT);   Surgeon: Raymon Osuna MD;  [facility];  Service: Vascular   • [place] RPR 1ST INGUN HRNA AGE 5 YRS/> REDUCIBLE Right 12/7/2016    Procedure: REPAIR HERNIA INGUINAL; HYDROCELECTOMY;  Surgeon: eDisi Del Toro MD;  Location: [facility];  Service: General   • [place] Brenda Richey 3RD+ ORD [place] [address] PEL/LXTR [address] [facility] Right 11/16/2020    Procedure: ARTERIOGRAM, DIAGNOSTIC AORTO ILIAC, RIGHT LOWER EXTREMITY  ANGIOGRAM, ANGIOPLASTY AND STENTING OF SFA/POPLITEAL, ANGIOPLASTY OF ANTERIOTIBIAL/POPLITEAL AND ATHERECTOMY, ANGIOPLASTY OF TIBIOPERINEAL TRUNK, MYNX CLOSURE;  Surgeon: Cierra Michaud MD;  Location: [facility];  Service: Vascular   • PROSTATE SURGERY  2014    PROSTATECTOMY RADICAL; LAST ASSESSED: 27OCT2015   • SKIN CANCER EXCISION     • TRANSURETHRAL RESECTION OF PROSTATE  2011   • VASCULAR SURGERY       Family History   Problem Relation Age of Onset   • Hypertension Mother    • Cancer Father    • Kidney failure Family      Social History     Socioeconomic History   • Marital status: /Civil Union     Spouse name: None   • Number of children: None   • Years of education: None   • Highest education level: None   Occupational History   • Occupation: RETIRED      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    Tobacco Use   • Smoking status: Never   • Smokeless tobacco: Never   Vaping Use   • Vaping Use: Never used   Substance and Sexual Activity   • Alcohol use: No   • Drug use: No   • Sexual activity: None   Other Topics Concern   • None   Social History Narrative    Always uses seat belt    No advance directives     Social Determinants of Health     Financial Resource Strain: Low Risk    • Difficulty of Paying Living Expenses: Not hard at all   Food Insecurity: Not on file   Transportation Needs: No Transportation Needs   • Lack of Transportation (Medical): No   • Lack of Transportation (Non-Medical):  No   Physical Activity: Not on file   Stress: Not on file   Social Connections: Not on file   Intimate Partner Violence: Not on file   Housing Stability: Not on file     Current Outpatient Medications on File Prior to Visit   Medication Sig   • aspirin (ECOTRIN LOW STRENGTH) 81 mg EC tablet opne po 2X a week, eg Monday and Friday  • bimatoprost (LUMIGAN) 0 01 % ophthalmic drops Administer 1 drop to both eyes daily at bedtime   • dorzolamide-timolol (COSOPT) 22 3-6 8 MG/ML ophthalmic solution    • latanoprost (XALATAN) 0 005 % ophthalmic solution    • multivitamin (THERAGRAN) TABS Take 1 tablet by mouth daily   • simvastatin (ZOCOR) 20 mg tablet One po daily     Allergies   Allergen Reactions   • Other Itching and Rash     SESAME SEEDS,SOB     Immunization History   Administered Date(s) Administered   • COVID-19 PFIZER VACCINE 0 3 ML IM 01/21/2021, 02/11/2021   • H1N1, All Formulations 01/08/2010   • INFLUENZA 09/23/2015, 10/05/2016, 10/09/2017, 10/15/2018   • Influenza Split High Dose Preservative Free IM 09/23/2015, 10/05/2016   • Influenza, seasonal, injectable 10/09/2017   • Pneumococcal Conjugate 13-Valent 10/09/2017   • Pneumococcal Polysaccharide PPV23 12/19/2018   • Tdap 12/19/2018   • influenza, trivalent, adjuvanted 10/08/2019       Objective     /78 (BP Location: Left arm, Patient Position: Sitting, Cuff Size: Standard)   Pulse 76   Temp 97 7 °F (36 5 °C) (Temporal)   Ht 5' 6" (1 676 m)   Wt 74 8 kg (165 lb)   SpO2 98%   BMI 26 63 kg/m²     Physical Exam  Constitutional:       Appearance: He is well-developed  HENT:      Head: Normocephalic and atraumatic  Right Ear: External ear normal       Left Ear: External ear normal       Nose: Nose normal       Mouth/Throat:      Mouth: Mucous membranes are moist       Pharynx: Oropharynx is clear  Eyes:      Conjunctiva/sclera: Conjunctivae normal    Cardiovascular:      Rate and Rhythm: Normal rate and regular rhythm  Heart sounds: Normal heart sounds  Pulmonary:      Effort: Pulmonary effort is normal       Breath sounds: Normal breath sounds     Musculoskeletal:      Cervical back: Normal range of motion and neck supple  Skin:     General: Skin is warm and dry  Neurological:      Mental Status: He is alert and oriented to person, place, and time  Deep Tendon Reflexes: Reflexes are normal and symmetric  Psychiatric:         Mood and Affect: Mood normal          Behavior: Behavior normal          Thought Content:  Thought content normal          Judgment: Judgment normal        Christal Garcia DO

## 2023-05-10 ENCOUNTER — OFFICE VISIT (OUTPATIENT)
Dept: FAMILY MEDICINE CLINIC | Facility: CLINIC | Age: 83
End: 2023-05-10

## 2023-05-10 VITALS
WEIGHT: 167 LBS | TEMPERATURE: 98 F | OXYGEN SATURATION: 99 % | HEIGHT: 66 IN | SYSTOLIC BLOOD PRESSURE: 110 MMHG | DIASTOLIC BLOOD PRESSURE: 76 MMHG | BODY MASS INDEX: 26.84 KG/M2 | HEART RATE: 74 BPM

## 2023-05-10 DIAGNOSIS — F41.8 ANXIETY ABOUT HEALTH: ICD-10-CM

## 2023-05-10 DIAGNOSIS — T50.905A ADVERSE EFFECT OF DRUG, INITIAL ENCOUNTER: ICD-10-CM

## 2023-05-10 DIAGNOSIS — I10 BENIGN ESSENTIAL HYPERTENSION: ICD-10-CM

## 2023-05-10 DIAGNOSIS — G44.52 NEW DAILY PERSISTENT HEADACHE: Primary | ICD-10-CM

## 2023-05-10 RX ORDER — METOPROLOL SUCCINATE 25 MG/1
25 TABLET, EXTENDED RELEASE ORAL 2 TIMES DAILY
Qty: 60 TABLET | Refills: 1 | Status: SHIPPED | OUTPATIENT
Start: 2023-05-10

## 2023-05-10 NOTE — PROGRESS NOTES
Name: Jael Herrera      : 1940      MRN: [id number]  Encounter Provider: Fredo Camejo DO  Encounter Date: 5/10/2023   Encounter department: [facility]    Assessment & Plan   Reassurance to pt and wife BP in safe range - needs fine tuning without side effects  Hold the Amlodipine and see if frontal HA's resolve - may take 2 - 4 days  Then Increase the Toprol XL 25 mg to BID  Chief Complaint   Patient presents with   • Follow-up   • Hypertension   • Headache              Subjective     BP  Added Amlodipine 2 5 mg and Toprol 25 mg last visit  Getting mild daily HAS's - forehead since starting the Amlodipine and Toprol  No more dizziness  BP by me: 132/70  I have spent a total time of 40 minutes on 05/10/23 in caring for this patient including Diagnostic results, Risks and benefits of tx options, Instructions for management, Patient and family education, Risk factor reductions and Impressions  Review of Systems   Constitutional: Negative  HENT: Negative  Eyes: Negative  Respiratory: Negative  Cardiovascular: Negative  Gastrointestinal: Negative  Genitourinary: Negative  Musculoskeletal: Negative  Skin: Negative  Neurological: Positive for headaches  Psychiatric/Behavioral: Negative          Past Medical History:   Diagnosis Date   • Basal cell carcinoma, ear, unspecified laterality     LAST ASSESSED: 2015   • Hyperlipidemia    • Hypertension    • Inguinal hernia     RESOLVED: [id number]   • Malignant neoplasm of prostate ([address] )     RESOLVED: [id number]   • Prostate enlargement     RESOLVED: [id number]   • 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, left     LAST ASSESSED: 2015     Past Surgical History:   Procedure Laterality Date   • COLONOSCOPY     • HERNIA REPAIR     • IR AORTAGRAM WITH RUN-OFF  2019   • IR AORTAGRAM WITH RUN-OFF  2020   • [place] ENDOVEN ABLTJ INCMPTNT VEIN XTR LASER 1ST VEIN Right 2020    Procedure: ENDOVASCULAR LASER THERAPY (EVLT); Surgeon: Mary Alice Arreola MD;  [facility];  Service: Vascular   • [place] RPR 1ST INGUN HRNA AGE 5 YRS/> REDUCIBLE Right 12/7/2016    Procedure: REPAIR HERNIA INGUINAL; HYDROCELECTOMY;  Surgeon: Shant Man MD;  Location: [facility];  Service: General   • [place] Meme Hearnus 3RD+ ORD [place] [address] PEL/LXTR 315 [facility] Right 11/16/2020    Procedure: ARTERIOGRAM, DIAGNOSTIC AORTO ILIAC, RIGHT LOWER EXTREMITY  ANGIOGRAM, ANGIOPLASTY AND STENTING OF SFA/POPLITEAL, ANGIOPLASTY OF ANTERIOTIBIAL/POPLITEAL AND ATHERECTOMY, ANGIOPLASTY OF TIBIOPERINEAL TRUNK, MYNX CLOSURE;  Surgeon: Mary Alice Arreola MD;  [facility];  Service: Vascular   • PROSTATE SURGERY  2014    PROSTATECTOMY RADICAL; LAST ASSESSED: 27OCT2015   • SKIN CANCER EXCISION     • TRANSURETHRAL RESECTION OF PROSTATE  2011   • VASCULAR SURGERY       Family History   Problem Relation Age of Onset   • Hypertension Mother    • Cancer Father    • Kidney failure Family      Social History     Socioeconomic History   • Marital status: /Civil Union     Spouse name: None   • Number of children: None   • Years of education: None   • Highest education level: None   Occupational History   • Occupation: RETIRED      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    Tobacco Use   • Smoking status: Never   • Smokeless tobacco: Never   Vaping Use   • Vaping Use: Never used   Substance and Sexual Activity   • Alcohol use: No   • Drug use: No   • Sexual activity: None   Other Topics Concern   • None   Social History Narrative    Always uses seat belt    No advance directives     Social Determinants of Health     Financial Resource Strain: Low Risk    • Difficulty of Paying Living Expenses: Not hard at all   Food Insecurity: Not on file   Transportation Needs: No Transportation Needs   • Lack of Transportation (Medical): No   • Lack of Transportation (Non-Medical):  No   Physical Activity: Not on file   Stress: Not on file   Social "Connections: Not on file   Intimate Partner Violence: Not on file   Housing Stability: Not on file     Current Outpatient Medications on File Prior to Visit   Medication Sig   • amLODIPine (NORVASC) 2 5 mg tablet Take 1 tablet (2 5 mg total) by mouth daily   • aspirin (ECOTRIN LOW STRENGTH) 81 mg EC tablet opne po 2X a week, eg Monday and Friday  • dorzolamide-timolol (COSOPT) 22 3-6 8 MG/ML ophthalmic solution    • latanoprost (XALATAN) 0 005 % ophthalmic solution    • multivitamin (THERAGRAN) TABS Take 1 tablet by mouth daily   • simvastatin (ZOCOR) 20 mg tablet One po daily   • [DISCONTINUED] metoprolol succinate (Toprol XL) 25 mg 24 hr tablet Take 1 tablet (25 mg total) by mouth daily   • bimatoprost (LUMIGAN) 0 01 % ophthalmic drops Administer 1 drop to both eyes daily at bedtime (Patient not taking: Reported on 5/10/2023)     Allergies   Allergen Reactions   • Other Itching and Rash     SESAME SEEDS,SOB     Immunization History   Administered Date(s) Administered   • COVID-19 PFIZER VACCINE 0 3 ML IM 01/21/2021, 02/11/2021   • H1N1, All Formulations 01/08/2010   • INFLUENZA 09/23/2015, 10/05/2016, 10/09/2017, 10/15/2018   • Influenza Split High Dose Preservative Free IM 09/23/2015, 10/05/2016   • Influenza, seasonal, injectable 10/09/2017   • Pneumococcal Conjugate 13-Valent 10/09/2017   • Pneumococcal Polysaccharide PPV23 12/19/2018   • Tdap 12/19/2018   • influenza, trivalent, adjuvanted 10/08/2019       Objective     /76 (BP Location: Left arm, Patient Position: Sitting, Cuff Size: Standard)   Pulse 74   Temp 98 °F (36 7 °C) (Temporal)   Ht 5' 6\" (1 676 m)   Wt 75 8 kg (167 lb)   SpO2 99%   BMI 26 95 kg/m²     Physical Exam  Constitutional:       Appearance: He is well-developed  HENT:      Head: Normocephalic and atraumatic        Right Ear: External ear normal       Left Ear: External ear normal       Nose: Nose normal       Mouth/Throat:      Mouth: Mucous membranes are moist       " Pharynx: Oropharynx is clear  Eyes:      Conjunctiva/sclera: Conjunctivae normal       Pupils: Pupils are equal, round, and reactive to light  Cardiovascular:      Rate and Rhythm: Normal rate and regular rhythm  Heart sounds: Normal heart sounds  Pulmonary:      Effort: Pulmonary effort is normal       Breath sounds: Normal breath sounds  Musculoskeletal:      Cervical back: Normal range of motion and neck supple  Skin:     General: Skin is warm and dry  Neurological:      Mental Status: He is alert and oriented to person, place, and time  Deep Tendon Reflexes: Reflexes are normal and symmetric  Psychiatric:         Mood and Affect: Mood normal          Behavior: Behavior normal          Thought Content:  Thought content normal          Judgment: Judgment normal        Eriskiana Botello, DO

## 2023-05-12 DIAGNOSIS — I10 BENIGN ESSENTIAL HYPERTENSION: ICD-10-CM

## 2023-05-12 RX ORDER — AMLODIPINE BESYLATE 2.5 MG/1
TABLET ORAL
Qty: 90 TABLET | Refills: 1 | Status: SHIPPED | OUTPATIENT
Start: 2023-05-12

## 2023-05-19 NOTE — TELEPHONE ENCOUNTER
Patient called to request a refill of Tramadol 50 mg, he is taking it once at bedtime for right ankle pain  Since he is running low he feels like he could use a refill  Patient is seeing a wound care specialist and had doppler done  During the day, the right ankle pain is 3/10 in the pain scale but at night pain is 7/10 and the Tramadol relieves his pain so he can sleep  Patient does also takes OTC Tylenol and Aspirin PRN  Yes...

## 2023-05-31 ENCOUNTER — OFFICE VISIT (OUTPATIENT)
Dept: FAMILY MEDICINE CLINIC | Facility: CLINIC | Age: 83
End: 2023-05-31

## 2023-05-31 VITALS
TEMPERATURE: 98 F | BODY MASS INDEX: 27.03 KG/M2 | SYSTOLIC BLOOD PRESSURE: 120 MMHG | OXYGEN SATURATION: 97 % | HEIGHT: 66 IN | DIASTOLIC BLOOD PRESSURE: 80 MMHG | RESPIRATION RATE: 18 BRPM | WEIGHT: 168.2 LBS | HEART RATE: 68 BPM

## 2023-05-31 DIAGNOSIS — I10 BENIGN ESSENTIAL HYPERTENSION: Primary | ICD-10-CM

## 2023-05-31 DIAGNOSIS — F41.8 ANXIETY ABOUT HEALTH: ICD-10-CM

## 2023-05-31 RX ORDER — METOPROLOL SUCCINATE 25 MG/1
25 TABLET, EXTENDED RELEASE ORAL DAILY
Qty: 90 TABLET | Refills: 1 | Status: SHIPPED | OUTPATIENT
Start: 2023-05-31

## 2023-05-31 NOTE — PROGRESS NOTES
Name: Alissa Camargo      : 1940      MRN: [id number]  Encounter Provider: Froy Olivares DO  Encounter Date: 2023   Encounter department: [address]     BP in [place] Mood range  BMI Counseling: Body mass index is 27 15 kg/m²  The BMI is above normal  Nutrition recommendations include reducing portion sizes  Chief Complaint   Patient presents with   • Headache     Follow up, amlodipine 2 5 mg was on hold, metoprolol 25 mg increased to BID, patient forgot to take metoprolol twice a day  HBPM ranges 120 to 300 systolic and 36'P diastolic     1  Benign essential hypertension  -     metoprolol succinate (Toprol XL) 25 mg 24 hr tablet; Take 1 tablet (25 mg total) by mouth daily    2  Anxiety about health           Subjective     BP at home - high of 941  low 930 systolic, dyastolic in low 97'R  Has mild HA when first get up and resolves as day progresses  Ed and wife thinks BARNHART's started with the eye drops  No HA this am, started using an air purifier at home  BP up in Dentist office  This got Ed nervous  I have spent a total time of 30 minutes on 23 in caring for this patient including Risks and benefits of tx options, Instructions for management, Patient and family education and Impressions  Review of Systems   Constitutional: Negative  HENT: Negative  Eyes: Negative  Respiratory: Negative  Cardiovascular: Negative  Gastrointestinal: Negative  Genitourinary: Negative  Musculoskeletal: Negative  Skin: Negative  Neurological: Negative  Psychiatric/Behavioral: The patient is nervous/anxious          Past Medical History:   Diagnosis Date   • Basal cell carcinoma, ear, unspecified laterality     LAST ASSESSED: [id number]   • Hyperlipidemia    • Hypertension    • Inguinal hernia     RESOLVED: [id number]   • Malignant neoplasm of prostate ([address] )     RESOLVED: [id number]   • Prostate enlargement     RESOLVED: [id number]   • 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, left     LAST ASSESSED: 27OCT2015     Past Surgical History:   Procedure Laterality Date   • COLONOSCOPY     • HERNIA REPAIR     • IR AORTAGRAM WITH RUN-OFF  9/20/2019   • IR AORTAGRAM WITH RUN-OFF  11/16/2020   • [place] ENDOVEN ABLTJ INCMPTNT VEIN XTR LASER 1ST VEIN Right 7/14/2020    Procedure: ENDOVASCULAR LASER THERAPY (EVLT);   Surgeon: Michele Bob MD;  [facility];  Service: Vascular   • [place] RPR 1ST INGUN HRNA AGE 5 YRS/> REDUCIBLE Right 12/7/2016    Procedure: REPAIR HERNIA INGUINAL; HYDROCELECTOMY;  Surgeon: Adelita Fuchs MD;  Location: [facility];  Service: General   • [place] Majel Bosworth 3RD+ ORD [place] [address] PEL/LXTR 315 [facility] Right 11/16/2020    Procedure: ARTERIOGRAM, DIAGNOSTIC AORTO ILIAC, RIGHT LOWER EXTREMITY  ANGIOGRAM, ANGIOPLASTY AND STENTING OF SFA/POPLITEAL, ANGIOPLASTY OF ANTERIOTIBIAL/POPLITEAL AND ATHERECTOMY, ANGIOPLASTY OF TIBIOPERINEAL TRUNK, MYNX CLOSURE;  Surgeon: Michele Bob MD;  [facility];  Service: Vascular   • PROSTATE SURGERY  2014    PROSTATECTOMY RADICAL; LAST ASSESSED: 27OCT2015   • SKIN CANCER EXCISION     • TRANSURETHRAL RESECTION OF PROSTATE  2011   • VASCULAR SURGERY       Family History   Problem Relation Age of Onset   • Hypertension Mother    • Cancer Father    • Kidney failure Family      Social History     Socioeconomic History   • Marital status: /Civil Union     Spouse name: None   • Number of children: None   • Years of education: None   • Highest education level: None   Occupational History   • Occupation: RETIRED      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    Tobacco Use   • Smoking status: Never   • Smokeless tobacco: Never   Vaping Use   • Vaping Use: Never used   Substance and Sexual Activity   • Alcohol use: No   • Drug use: No   • Sexual activity: None   Other Topics Concern   • None   Social History Narrative    Always uses seat belt    No advance directives     Social Determinants of Health Financial Resource Strain: Low Risk  (1/24/2023)    Overall Financial Resource Strain (CARDIA)    • Difficulty of Paying Living Expenses: Not hard at all   Food Insecurity: Not on file   Transportation Needs: No Transportation Needs (1/24/2023)    PRAPARE - Transportation    • Lack of Transportation (Medical): No    • Lack of Transportation (Non-Medical): No   Physical Activity: Not on file   Stress: Not on file   Social Connections: Not on file   Intimate Partner Violence: Not on file   Housing Stability: Not on file     Current Outpatient Medications on File Prior to Visit   Medication Sig   • aspirin (ECOTRIN LOW STRENGTH) 81 mg EC tablet opne po 2X a week, eg Monday and Friday     • dorzolamide-timolol (COSOPT) 22 3-6 8 MG/ML ophthalmic solution    • latanoprost (XALATAN) 0 005 % ophthalmic solution    • multivitamin (THERAGRAN) TABS Take 1 tablet by mouth daily   • simvastatin (ZOCOR) 20 mg tablet One po daily   • [DISCONTINUED] amLODIPine (NORVASC) 2 5 mg tablet TAKE 1 TABLET BY MOUTH EVERY DAY   • [DISCONTINUED] metoprolol succinate (Toprol XL) 25 mg 24 hr tablet Take 1 tablet (25 mg total) by mouth 2 (two) times a day   • bimatoprost (LUMIGAN) 0 01 % ophthalmic drops Administer 1 drop to both eyes daily at bedtime (Patient not taking: Reported on 5/10/2023)     Allergies   Allergen Reactions   • Other Itching and Rash     SESAME SEEDS,SOB     Immunization History   Administered Date(s) Administered   • COVID-19 PFIZER VACCINE 0 3 ML IM 01/21/2021, 02/11/2021   • H1N1, All Formulations 01/08/2010   • INFLUENZA 09/23/2015, 10/05/2016, 10/09/2017, 10/15/2018   • Influenza Split High Dose Preservative Free IM 09/23/2015, 10/05/2016   • Influenza, seasonal, injectable 10/09/2017   • Pneumococcal Conjugate 13-Valent 10/09/2017   • Pneumococcal Polysaccharide PPV23 12/19/2018   • Tdap 12/19/2018   • influenza, trivalent, adjuvanted 10/08/2019       Objective     /80   Pulse 68   Temp 98 °F (36 7 °C) "(Temporal)   Resp 18   Ht 5' 6\" (1 676 m)   Wt 76 3 kg (168 lb 3 2 oz)   SpO2 97%   BMI 27 15 kg/m²     Physical Exam  Constitutional:       Appearance: He is well-developed  HENT:      Head: Normocephalic and atraumatic  Right Ear: External ear normal       Left Ear: External ear normal       Nose: Nose normal       Mouth/Throat:      Mouth: Mucous membranes are moist       Pharynx: Oropharynx is clear  Eyes:      Pupils: Pupils are equal, round, and reactive to light  Cardiovascular:      Rate and Rhythm: Normal rate and regular rhythm  Heart sounds: Normal heart sounds  Pulmonary:      Effort: Pulmonary effort is normal       Breath sounds: Normal breath sounds  Musculoskeletal:      Cervical back: Normal range of motion and neck supple  Skin:     General: Skin is warm and dry  Neurological:      Mental Status: He is alert and oriented to person, place, and time  Deep Tendon Reflexes: Reflexes are normal and symmetric  Psychiatric:         Mood and Affect: Mood normal          Behavior: Behavior normal          Thought Content:  Thought content normal          Judgment: Judgment normal        Froy Olivares DO  "

## 2023-06-03 DIAGNOSIS — E78.00 HYPERCHOLESTEREMIA: ICD-10-CM

## 2023-06-05 RX ORDER — SIMVASTATIN 20 MG
TABLET ORAL
Qty: 90 TABLET | Refills: 1 | Status: SHIPPED | OUTPATIENT
Start: 2023-06-05

## 2023-07-12 ENCOUNTER — TELEPHONE (OUTPATIENT)
Dept: VASCULAR SURGERY | Facility: CLINIC | Age: 83
End: 2023-07-12

## 2023-07-12 NOTE — TELEPHONE ENCOUNTER
Attempted to contact patient to schedule appointment(s) listed below. Requested patient call [phone] option 3 to schedule appointment(s). Patient's appointment(s) are due on or after 8/8/23. Dopplers  [] Abdominal Aorta Iliac (AOIL)  [] Carotid (CV)   [] Celiac and/or Mesenteric  [] Endovascular Aortic Repair (EVAR)   [] Hemodialysis Access (HD)   [x] Lower Limb Arterial (CARSON) 8/8/23. [] Lower Limb Venous (LEV)  [] Lower Limb Venous Duplex with Reflux (LEVDR)  [] Renal Artery  [] Upper Limb Arterial (UEA)    [] Upper Limb Venous (UEV)              [] JANICE and Waveform analysis     Advanced Imaging   [] CTA head/neck    [] CTA abdomen    [] CTA abdomen & pelvis    [] CT abdomen with/ without contrast  [] CT abdomen with contrast  [] CT abdomen without contrast    [] CT abdomen & pelvis with/ without contrast  [] CT abdomen & pelvis with contrast  [] CT abdomen & pelvis without contrast    Office Visit   [] New patient, patient last seen over 3 years ago  [] Risk factor modification (RFM)   [x] Follow up After 8/8/23. [] Lost to follow up (LTFU)  MJQ/Review CARSON schedule for 8/8/23. Called pt, No answer. Left VM with call back info.

## 2023-07-17 ENCOUNTER — APPOINTMENT (OUTPATIENT)
Dept: LAB | Facility: CLINIC | Age: 83
End: 2023-07-17
Payer: MEDICARE

## 2023-07-17 DIAGNOSIS — C61 PROSTATE CANCER (HCC): ICD-10-CM

## 2023-07-17 PROCEDURE — 84153 ASSAY OF PSA TOTAL: CPT

## 2023-07-17 PROCEDURE — 36415 COLL VENOUS BLD VENIPUNCTURE: CPT

## 2023-07-18 LAB — PSA SERPL-MCNC: <0.01 NG/ML (ref 0–4)

## 2023-07-21 ENCOUNTER — OFFICE VISIT (OUTPATIENT)
Dept: UROLOGY | Facility: CLINIC | Age: 83
End: 2023-07-21
Payer: MEDICARE

## 2023-07-21 VITALS
BODY MASS INDEX: 27 KG/M2 | WEIGHT: 168 LBS | HEART RATE: 72 BPM | SYSTOLIC BLOOD PRESSURE: 160 MMHG | HEIGHT: 66 IN | DIASTOLIC BLOOD PRESSURE: 82 MMHG

## 2023-07-21 DIAGNOSIS — C61 PROSTATE CANCER (HCC): Primary | ICD-10-CM

## 2023-07-21 PROCEDURE — 99213 OFFICE O/P EST LOW 20 MIN: CPT | Performed by: PHYSICIAN ASSISTANT

## 2023-07-21 NOTE — PROGRESS NOTES
UROLOGY PROGRESS NOTE   Patient Identifiers: Deandra Lawson (MRN [id number])  Date of Service: 7/21/2023    Subjective:   68-year-old man history of Christ 7 stage II prostate cancer. He had a robotic prostatectomy in 2014. PSA remains undetectable<0.1. He has fairly good control but still wears a pad. No other complaints. Reason for visit: Prostate cancer follow-up    Objective:     VITALS:    There were no vitals filed for this visit. LABS:  Lab Results   Component Value Date    HGB 15.2 06/07/2022    HCT 46.0 06/07/2022    WBC 11.35 (H) 06/07/2022     06/07/2022   ]    Lab Results   Component Value Date     12/09/2016    K 4.2 01/19/2023     01/19/2023    CO2 28 01/19/2023    BUN 26 (H) 01/19/2023    CREATININE 1.15 01/19/2023    CALCIUM 9.0 01/19/2023    GLUCOSE 103 11/16/2020   ]        INPATIENT MEDS:    Current Outpatient Medications:   •  aspirin (ECOTRIN LOW STRENGTH) 81 mg EC tablet, opne po 2X a week, eg Monday and Friday., Disp: , Rfl:   •  bimatoprost (LUMIGAN) 0.01 % ophthalmic drops, Administer 1 drop to both eyes daily at bedtime (Patient not taking: Reported on 5/10/2023), Disp: , Rfl:   •  dorzolamide-timolol (COSOPT) 22.3-6.8 MG/ML ophthalmic solution, , Disp: , Rfl:   •  latanoprost (XALATAN) 0.005 % ophthalmic solution, , Disp: , Rfl:   •  metoprolol succinate (Toprol XL) 25 mg 24 hr tablet, Take 1 tablet (25 mg total) by mouth daily, Disp: 90 tablet, Rfl: 1  •  multivitamin (THERAGRAN) TABS, Take 1 tablet by mouth daily, Disp: , Rfl:   •  simvastatin (ZOCOR) 20 mg tablet, TAKE 1 TABLET BY MOUTH EVERY DAY, Disp: 90 tablet, Rfl: 1      Physical Exam:   There were no vitals taken for this visit. GEN: no acute distress    RESP: breathing comfortably with no accessory muscle use    ABD: soft, non-tender, non-distended   INCISION:    EXT: no significant peripheral edema       RADIOLOGY:   None    Assessment:   #1.   Prostate cancer    Plan:   -Follow-up in 1 year with PSA prior to visit  -We will discharge to his family physician for continued PSA monitoring thereafter  -  -

## 2023-07-21 NOTE — PROGRESS NOTES
Assessment  1  Benign essential hypertension (401 1) (I10)   2  Hypercholesterolemia (272 0) (E78 00)   3  Prostate cancer (185) (C61)   4  Atypical mole (216 9) (D22 9)    Plan  Atypical mole    · 2 - Jose Ramon Crespo MD, [facility]  (Plastic And Reconstructive Surgery) Co-Management  Physicsprofessor with a History of BCC 's  Status: Hold For - Scheduling  Requestedfor: [id number]  Care Summary provided  : Yes   · Follow-up visit in 6 months Evaluation and Treatment  Follow-up  Status: Hold For -Scheduling  Requested for: 41BEX6124  Benign essential hypertension    · Atenolol 25 MG Oral Tablet; Take 1/2 tab daily   · Losartan Potassium 50 MG Oral Tablet; take 1 tab daily  Hypercholesterolemia    · Simvastatin 20 MG Oral Tablet; TAKE 1 TABLET DAILY  Screening for genitourinary condition    · *VB - Urinary Incontinence Screen (Dx Z13 89 Screen for UI); Status:Complete;   Done:98Hwa2668 09:15AM  Screening for genitourinary condition, Screening for neurological condition    · *VB - Urinary Incontinence Screen (Dx Z13 89 Screen for UI) ; every 1 year; [id number]; Next 37NBY3601; Status:Active    Discussion/Summary    Reviewed labs, refill Meds, need lesions removed  Possible side effects of new medications were reviewed with the patient/guardian today  The treatment plan was reviewed with the patient/guardian  The patient/guardian understands and agrees with the treatment plan     Self Referrals: No      Chief Complaint  pt here for 6 month follow up for hypertension and to review Bwpt c/o harden finger on left hand and lump on back of neck      History of Present Illness  Follow up  Nail bed right finger left hand is hard  Was area of nail fungus  #2 lesion neck area, atypical lesions  History of BCC  No urine leakage, hx of prostate CA  Review of Systems   Constitutional: No fever or chills, feels well, no tiredness, no recent weight gain or weight loss    Eyes: No complaints of eye pain, no red eyes, no discharge from eyes, Conjuntivae and eyelids appear normal,  Sclerae : White without injection no itchy eyes  ENT: no complaints of earache, no hearing loss, no nosebleeds, no nasal discharge, no sore throat, no hoarseness  Cardiovascular: No complaints of slow heart rate, no fast heart rate, no chest pain, no palpitations, no leg claudication, no lower extremity  Respiratory: No complaints of shortness of breath, no wheezing, no cough, no SOB on exertion, no orthopnea or PND  Gastrointestinal: No complaints of abdominal pain, no constipation, no nausea or vomiting, no diarrhea or bloody stools  Genitourinary: No complaints of dysuria, no incontinence, no hesitancy, no nocturia, no genital lesion, no testicular pain  Musculoskeletal: No complaints of arthralgia, no myalgias, no joint swelling or stiffness, no limb pain or swelling  Integumentary: skin lesion, but-- as noted in HPI  Neurological: No compliants of headache, no confusion, no convulsions, no numbness or tingling, no dizziness or fainting, no limb weakness, no difficulty walking  Psychiatric: Is not suicidal, no sleep disturbances, no anxiety or depression, no change in personality, no emotional problems  Endocrine: No complaints of proptosis, no hot flashes, no muscle weakness, no erectile dysfunction, no deepening of the voice, no feelings of weakness  Hematologic/Lymphatic: No complaints of swollen glands, no swollen glands in the neck, does not bleed easily, no easy bruising  Preventive Quality 65 and Older: The patient currently has no urinary incontinence symptoms  Active Problems  1  Actinic keratosis (702 0) (L57 0)   2  Basal cell carcinoma of skin (173 91) (C44 91)   3  Benign essential hypertension (401 1) (I10)   4  Colon cancer screening (V76 51) (Z12 11)   5  Colonoscopy (Fiberoptic) Screening   6  Depression screening (V79 0) (Z13 89)   7  Glaucoma screening (V80 1) (Z13 5)   8  Hypercholesterolemia (272 0) (E78 00)   9  Peripheral vascular disease (443 9) (I73 9)   10   Pes planus, unspecified laterality (734) (M21 40)   11  Postoperative examination (V67 00) (Z09)   12  Prostate cancer (185) (C61)   13  Screening for genitourinary condition (V81 6) (Z13 89)   14  Screening for neurological condition (V80 09) (Z13 89)   15  Transition of care   16  Vitamin D deficiency, unspecified (268 9) (E55 9)    Past Medical History  1  History of Ankle pain, unspecified laterality   2  History of Basal cell carcinoma of ear, unspecified laterality (173 21) (C44 211)   3  History of edema (V13 89) (Z87 898)   4  History of malignant neoplasm of prostate (V10 46) (Z85 46)   5  History of Inguinal hernia (550 90) (K40 90)   6  History of Prostate enlargement (600 00) (N40 0)   7  History of Prostate Enlargement   8  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, 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)    Surgical History  1  History of Inguinal Hernia Repair   2  History of Prostatectomy Radical    Family History  Mother    1  Family history of Hypertension  Father    2  Family history of Cancer  Family History    3  Family history of Renal Failure    Social History   · Always uses seat belt   ·    · Never A Smoker   · Never Drank Alcohol   · No advance directives (V49 89) (K28 3)   · No illicit drug use   · Retired    Current Meds   1  Aspirin 81 MG TABS; Therapy: 31CIF4764 to Recorded   2  Atenolol 25 MG Oral Tablet; Take 1/2 tab daily; Therapy: 62GSC1961 to (Evaluate:05Odu4378)  Requested for: 20Jun2017; Last Rx:20Jun2017 Ordered   3  Azopt 1 % Ophthalmic Suspension; Therapy: (Ramon Rosen) to Recorded   4  Daily Value Multivitamin Oral Tablet; TAKE 1 TABLET DAILY Recorded   5  Losartan Potassium 50 MG Oral Tablet; take 1 tab daily; Therapy: 16WXG2129 to (Evaluate:15Jun2018)  Requested for: 20Jun2017; Last Rx:20Jun2017 Ordered   6  Lumigan 0 01 % Ophthalmic Solution; Therapy: (Ramon Older) to Recorded   7  Simvastatin 20 MG Oral Tablet; TAKE 1 TABLET DAILY;  Therapy: 08NIL1575 to (Evaluate:12Ivv9620)  Requested for: 69LYG4031; Last Rx:47Xbs3350 Ordered    The medication list was reviewed and updated today  Allergies  1  No Known Drug Allergies  2  Other    Vitals  Vital Signs    Recorded: 40THJ6522 09:09AM   Temperature 98 F, Oral   Heart Rate 78   Systolic 141, LUE   Diastolic 78, LUE   Height 5 ft 4 5 in   Weight 161 lb    BMI Calculated 27 21   BSA Calculated 1 79   O2 Saturation 96     Physical Exam   Constitutional  General appearance: No acute distress, well appearing and well nourished  Eyes  Conjunctiva and lids: No swelling, erythema, or discharge  Pupils and irises: Equal, round and reactive to light  Ears, Nose, Mouth, and Throat  External inspection of ears and nose: Normal    Otoscopic examination: Tympanic membrance translucent with normal light reflex  Canals patent without erythema  Nasal mucosa, septum, and turbinates: Normal without edema or erythema  Oropharynx: Normal with no erythema, edema, exudate or lesions  Pulmonary  Respiratory effort: No increased work of breathing or signs of respiratory distress  Auscultation of lungs: Clear to auscultation, equal breath sounds bilaterally, no wheezes, no rales, no rhonci  Cardiovascular  Auscultation of heart: Normal rate and rhythm, normal S1 and S2, without murmurs  Examination of extremities for edema and/or varicosities: Normal    Abdomen  Abdomen: Non-tender, no masses  Liver and spleen: No hepatomegaly or splenomegaly  Lymphatic  Palpation of lymph nodes in neck: No lymphadenopathy  Musculoskeletal  Gait and station: Normal    Digits and nails: Abnormal  -- Dry nail bed  Inspection/palpation of joints, bones, and muscles: Normal    Skin  Skin and subcutaneous tissue: Abnormal  -- (Abnormal lesions of cervical, one resembles BCC the other unknown)  Neurologic  Cranial nerves: Cranial nerves 2-12 intact  Reflexes: 2+ and symmetric  Sensation: No sensory loss     Psychiatric  Orientation to person, place and time: Normal    Mood and affect: Normal          Results/Data  *VB - Urinary Incontinence Screen (Dx Z13 89 Screen for UI) 65BLA1617 09:15AM Abel Armas     Test Name Result Flag Reference   Urinary Incontinence Assessment 42RYC7254       Falls Risk Assessment (Dx Z13 89 Screen for Neurologic Disorder) 77DNF2297 09:13AM User, Daryn     Test Name Result Flag Reference   Falls Risk      No falls in the past year     (1) LIPID PANEL, FASTING 37Zex6651 07:54AM [facility] Order Number: [id number]     Test Name Result Flag Reference   CHOLESTEROL 162 mg/dL     HDL,DIRECT 46 mg/dL  40-60   Specimen collection should occur prior to Metamizole administration due to the potential for falsley depressed results  LDL CHOLESTEROL CALCULATED 80 mg/dL  0-100   Triglyceride:       Normal <150 mg/dl  Borderline High 150-199 mg/dl  High 200-499 mg/dl  Very High >499 mg/dl   Cholesterol:      Desirable <200 mg/dl   Borderline High 200-239 mg/dl   High >239 mg/dl   HDL Cholesterol:      High>59 mg/dL   Low <41 mg/dL   This screening LDL is a calculated result  It does not have the accuracy of the Direct Measured LDL in the monitoring of patients with hyperlipidemia and/or statin therapy  Direct Measure LDL (BMW295) must be ordered separately in these patients  TRIGLYCERIDES 181 mg/dL H <=150   Specimen collection should occur prior to N-Acetylcysteine or Metamizole administration due to the potential for falsely depressed results       (1) CBC/PLT/DIFF 07ZPJ8730 07:54AM [facility] Order Number: [id number]     Test Name Result Flag Reference   WBC COUNT 11 27 Thousand/uL H 4 31-10 16   RBC COUNT 5 04 Million/uL  3 88-5 62   HEMOGLOBIN 15 5 g/dL  12 0-17 0   HEMATOCRIT 46 2 %  36 5-49 3   MCV 92 fL  82-98   MCH 30 8 pg  26 8-34 3   MCHC 33 5 g/dL  31 4-37 4   RDW 14 0 %  11 6-15 1   MPV 11 0 fL  8 9-12 7   PLATELET COUNT 041 Thousands/uL  149-390   nRBC AUTOMATED 1 /100 WBCs     NEUTROPHILS RELATIVE PERCENT 64 %  43-75   LYMPHOCYTES RELATIVE PERCENT 23 %  14-44   MONOCYTES RELATIVE PERCENT 8 %  4-12   EOSINOPHILS RELATIVE PERCENT 5 %  0-6   BASOPHILS RELATIVE PERCENT 0 %  0-1   NEUTROPHILS ABSOLUTE COUNT 7 06 Thousands/? ??L  1 85-7 62   LYMPHOCYTES ABSOLUTE COUNT 2 60 Thousands/? ??L  0 60-4 47   MONOCYTES ABSOLUTE COUNT 0 91 Thousand/? ??L  0 17-1 22   EOSINOPHILS ABSOLUTE COUNT 0 59 Thousand/? ??L  0 00-0 61   BASOPHILS ABSOLUTE COUNT 0 04 Thousands/? ??L  0 00-0 10     (1) PSA, DIAGNOSTIC (FOLLOW-UP) 86WUK9866 10:29AM [facility], Provider   Test ordered by: Chirag Renae     Test Name Result Flag Reference   PSA <0 1 ng/mL  0 0-4 0   American Urological Association Guidelines define biochemical recurrence of prostate cancer as a detectable or rising PSA value post-radical prostatectomy that is greater than or equal to 0 2 ng/mL with a second confirmatory level of greater than or equal to 0 2 ng/mL  Health Management  Benign essential hypertension   (1) COMPREHENSIVE METABOLIC PANEL; every 1 year; Last 80GEB3372; Next Due: 00UOW5927; Overdue  (1) LIPID PANEL, FASTING; every 1 year; Last 73RSU3598; Next Due: 02VYZ4288; Active  EKG/ECG- POC; every 1 year; Last 41UEM8558; Next Due: 10KST8608; Overdue  Colon cancer screening   COLONOSCOPY; every 5 years; Last 89Zxq9015; Next Due: 60Czh3198; Overdue  Depression screening   *VB-Depression Screening; every 1 year; Last 71SYY3299; Next Due: 32Phn0776; Near Due  Glaucoma screening   *VB - Glaucoma Screen; every 1 year; Last 99QHW5026; Next Due: 53HDD8294; Overdue  History of Prostate Enlargement   (1) PSA, DIAGNOSTIC (FOLLOW-UP); every 1 year; Last 93KWG3114; Next Due: 72OII3964; Active  Screening for genitourinary condition   *VB - Fall Risk Assessment  (Dx Z13 89 Screen for Neurologic Disorder); every 1 year; VQYC05ZBB5025; Next Due: 07Muo3886; Overdue  *VB - Urinary Incontinence Screen (Dx Z13 89 Screen for UI); every 1 year; Last 82WBU6500; NextDue: 28UNV4409;  Active  Screening for neurological condition   *VB - Fall Risk Assessment  (Dx Z13 89 Screen for Neurologic Disorder); every 1 year; MXYB76YGO2517; Next Due: 85Dcr4083; Overdue  *VB - Urinary Incontinence Screen (Dx Z13 89 Screen for UI); every 1 year; Last 38OFO9025; NextDue: 92GOX7353; Active  Health Maintenance   Medicare Annual Wellness Visit; every 1 year; Next Due: 32HMW1043;  Overdue    Future Appointments    Date/Time Provider Specialty Site   06/18/2018 09:15 AM Aristeo Almanza DO [facility]     Signatures   Electronically signed by : Jenelle Abraham DO; Dec 18 2017 12:39PM EST                       (Author)

## 2023-08-08 ENCOUNTER — HOSPITAL ENCOUNTER (OUTPATIENT)
Dept: NON INVASIVE DIAGNOSTICS | Facility: CLINIC | Age: 83
Discharge: HOME | End: 2023-08-08
Payer: MEDICARE

## 2023-08-08 DIAGNOSIS — I73.9 PERIPHERAL VASCULAR DISEASE, UNSPECIFIED (HCC): ICD-10-CM

## 2023-08-08 PROCEDURE — 93925 LOWER EXTREMITY STUDY: CPT

## 2023-08-08 PROCEDURE — 93923 UPR/LXTR ART STDY 3+ LVLS: CPT

## 2023-08-09 PROCEDURE — 93925 LOWER EXTREMITY STUDY: CPT | Performed by: SURGERY

## 2023-08-09 PROCEDURE — 93922 UPR/L XTREMITY ART 2 LEVELS: CPT | Performed by: SURGERY

## 2023-08-22 ENCOUNTER — OFFICE VISIT (OUTPATIENT)
Dept: FAMILY MEDICINE CLINIC | Facility: CLINIC | Age: 83
End: 2023-08-22
Payer: MEDICARE

## 2023-08-22 VITALS
WEIGHT: 169 LBS | OXYGEN SATURATION: 97 % | DIASTOLIC BLOOD PRESSURE: 72 MMHG | TEMPERATURE: 98.6 F | BODY MASS INDEX: 27.16 KG/M2 | HEIGHT: 66 IN | SYSTOLIC BLOOD PRESSURE: 138 MMHG | HEART RATE: 74 BPM

## 2023-08-22 DIAGNOSIS — I10 BENIGN ESSENTIAL HYPERTENSION: ICD-10-CM

## 2023-08-22 DIAGNOSIS — E78.00 HYPERCHOLESTEREMIA: ICD-10-CM

## 2023-08-22 PROCEDURE — 99213 OFFICE O/P EST LOW 20 MIN: CPT | Performed by: FAMILY MEDICINE

## 2023-08-22 RX ORDER — METOPROLOL SUCCINATE 25 MG/1
25 TABLET, EXTENDED RELEASE ORAL DAILY
Qty: 90 TABLET | Refills: 1 | Status: SHIPPED | OUTPATIENT
Start: 2023-08-22

## 2023-08-22 RX ORDER — SIMVASTATIN 20 MG
TABLET ORAL
Qty: 90 TABLET | Refills: 1 | Status: SHIPPED | OUTPATIENT
Start: 2023-08-22

## 2023-08-22 RX ORDER — AMLODIPINE BESYLATE 2.5 MG/1
2.5 TABLET ORAL
Qty: 90 TABLET | Refills: 1 | Status: SHIPPED | OUTPATIENT
Start: 2023-08-22

## 2023-08-22 NOTE — PROGRESS NOTES
Name: Radha Ramirez      : 1940      MRN: [id number]  Encounter Provider: Frank Haque DO  Encounter Date: 2023   Encounter department: [facility]    Assessment & Plan     Check BP q 3 - 4 weeks, daily is too much. Explained to Ed and wife, the BP is in safe range. Amlodipine 2.5 mg daily - strart. Chief Complaint   Patient presents with   • Follow-up   • Hypertension   BMI Counseling: Body mass index is 27.28 kg/m². The BMI is above normal. Nutrition recommendations include reducing portion sizes. PHQ-2/9 Depression Screening    Little interest or pleasure in doing things: 0 - not at all  Feeling down, depressed, or hopeless: 0 - not at all  PHQ-2 Score: 0  PHQ-2 Interpretation: Negative depression screen              Subjective     BP and labs. Has been checking BP every evening. 140-150/70-low 80's. HA's have decreased, seen by eye Frank Dewitt can be causes by the eye drops. These have eased off. Review of Systems   Constitutional: Negative. HENT: Negative. Eyes: Negative. Respiratory: Negative. Cardiovascular: Negative. Gastrointestinal: Negative. Genitourinary: Negative. Musculoskeletal: Negative. Skin: Negative. Neurological: Negative. Psychiatric/Behavioral: Negative.         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, left     LAST ASSESSED: 2015     Past Surgical History:   Procedure Laterality Date   • COLONOSCOPY     • HERNIA REPAIR     • IR AORTAGRAM WITH RUN-OFF  2019   • IR AORTAGRAM WITH RUN-OFF  2020   • TX ENDOVEN ABLTJ INCMPTNT VEIN XTR LASER 1ST VEIN Right 2020    Procedure: ENDOVASCULAR LASER THERAPY (EVLT); Surgeon: Chinmay Pham MD;  Location: [facility];  Service: Vascular   • NE RPR 1ST INGUN HRNA AGE 5 YRS/> REDUCIBLE Right 12/7/2016    Procedure: REPAIR HERNIA INGUINAL; HYDROCELECTOMY;  Surgeon: Dewey Kumar MD;  Location: [facility];  Service: General   • NE Kimber Olszewski 3RD+ ORD [address] PEL/TR [facility] Right 11/16/2020    Procedure: ARTERIOGRAM, DIAGNOSTIC AORTO ILIAC, RIGHT LOWER EXTREMITY  ANGIOGRAM, ANGIOPLASTY AND STENTING OF SFA/POPLITEAL, ANGIOPLASTY OF ANTERIOTIBIAL/POPLITEAL AND ATHERECTOMY, ANGIOPLASTY OF TIBIOPERINEAL TRUNK, MYNX CLOSURE;  Surgeon: Chinmay Pham MD;  Location: [facility];  Service: Vascular   • PROSTATE SURGERY  2014    PROSTATECTOMY RADICAL; LAST ASSESSED: 27OCT2015   • SKIN CANCER EXCISION     • TRANSURETHRAL RESECTION OF PROSTATE  2011   • VASCULAR SURGERY       Family History   Problem Relation Age of Onset   • Hypertension Mother    • Cancer Father    • Kidney failure Family      Social History     Socioeconomic History   • Marital status: /Civil Union     Spouse name: None   • Number of children: None   • Years of education: None   • Highest education level: None   Occupational History   • Occupation: RETIRED      Employer: [address] [place] [address]     Comment: [employer]    Tobacco Use   • Smoking status: Never   • Smokeless tobacco: Never   Vaping Use   • Vaping Use: Never used   Substance and Sexual Activity   • Alcohol use: No   • Drug use: No   • Sexual activity: None   Other Topics Concern   • None   Social History Narrative    Always uses seat belt    No advance directives     Social Determinants of Health     Financial Resource Strain: Low Risk  (1/24/2023)    Overall Financial Resource Strain (CARDIA)    • Difficulty of Paying Living Expenses: Not hard at all   Food Insecurity: Not on file   Transportation Needs: No Transportation Needs (1/24/2023)    PRAPARE - Transportation    • Lack of Transportation (Medical):  No    • Lack of Transportation (Non-Medical): No   Physical Activity: Not on file   Stress: Not on file   Social Connections: Not on file   Intimate Partner Violence: Not on file   Housing Stability: Not on file     Current Outpatient Medications on File Prior to Visit   Medication Sig   • aspirin (ECOTRIN LOW STRENGTH) 81 mg EC tablet opne po 2X a week, eg Monday and Friday. • dorzolamide-timolol (COSOPT) 22.3-6.8 MG/ML ophthalmic solution    • latanoprost (XALATAN) 0.005 % ophthalmic solution    • multivitamin (THERAGRAN) TABS Take 1 tablet by mouth daily   • [DISCONTINUED] metoprolol succinate (Toprol XL) 25 mg 24 hr tablet Take 1 tablet (25 mg total) by mouth daily   • [DISCONTINUED] simvastatin (ZOCOR) 20 mg tablet TAKE 1 TABLET BY MOUTH EVERY DAY   • bimatoprost (LUMIGAN) 0.01 % ophthalmic drops Administer 1 drop to both eyes daily at bedtime (Patient not taking: Reported on 5/10/2023)     Allergies   Allergen Reactions   • Other Itching and Rash     SESAME SEEDS,SOB     Immunization History   Administered Date(s) Administered   • COVID-19 PFIZER VACCINE 0.3 ML IM 01/21/2021, 02/11/2021   • H1N1, All Formulations 01/08/2010   • INFLUENZA 09/23/2015, 10/05/2016, 10/09/2017, 10/15/2018   • Influenza Split High Dose Preservative Free IM 09/23/2015, 10/05/2016   • Influenza, seasonal, injectable 10/09/2017   • Pneumococcal Conjugate 13-Valent 10/09/2017   • Pneumococcal Polysaccharide PPV23 12/19/2018   • Tdap 12/19/2018   • influenza, trivalent, adjuvanted 10/08/2019       Objective     /72   Pulse 74   Temp 98.6 °F (37 °C) (Temporal)   Ht 5' 6" (1.676 m)   Wt 76.7 kg (169 lb)   SpO2 97%   BMI 27.28 kg/m²     Physical Exam  Constitutional:       Appearance: He is well-developed. HENT:      Head: Normocephalic and atraumatic.       Right Ear: External ear normal.      Left Ear: External ear normal.      Nose: Nose normal.   Eyes:      Conjunctiva/sclera: Conjunctivae normal.      Pupils: Pupils are equal, round, and reactive to light. Cardiovascular:      Rate and Rhythm: Normal rate and regular rhythm. Heart sounds: Normal heart sounds. Pulmonary:      Effort: Pulmonary effort is normal.      Breath sounds: Normal breath sounds. Musculoskeletal:      Cervical back: Normal range of motion and neck supple. Skin:     General: Skin is warm and dry. Neurological:      Mental Status: He is alert and oriented to person, place, and time. Deep Tendon Reflexes: Reflexes are normal and symmetric. Psychiatric:         Mood and Affect: Mood normal.         Behavior: Behavior normal.         Thought Content:  Thought content normal.         Judgment: Judgment normal.       Rodri Miller, DO

## 2023-08-24 ENCOUNTER — TELEPHONE (OUTPATIENT)
Dept: VASCULAR SURGERY | Facility: CLINIC | Age: 83
End: 2023-08-24

## 2023-08-24 ENCOUNTER — OFFICE VISIT (OUTPATIENT)
Dept: VASCULAR SURGERY | Facility: CLINIC | Age: 83
End: 2023-08-24
Payer: MEDICARE

## 2023-08-24 VITALS
BODY MASS INDEX: 27.16 KG/M2 | WEIGHT: 169 LBS | HEART RATE: 68 BPM | DIASTOLIC BLOOD PRESSURE: 90 MMHG | SYSTOLIC BLOOD PRESSURE: 138 MMHG | OXYGEN SATURATION: 98 % | HEIGHT: 66 IN

## 2023-08-24 DIAGNOSIS — I70.213 ATHEROSCLEROSIS OF NATIVE ARTERY OF BOTH LOWER EXTREMITIES WITH INTERMITTENT CLAUDICATION (HCC): Primary | ICD-10-CM

## 2023-08-24 PROCEDURE — 99214 OFFICE O/P EST MOD 30 MIN: CPT | Performed by: SURGERY

## 2023-08-24 RX ORDER — CHLORHEXIDINE GLUCONATE 0.12 MG/ML
15 RINSE ORAL ONCE
OUTPATIENT
Start: 2023-08-24 | End: 2023-08-24

## 2023-08-24 NOTE — ASSESSMENT & PLAN NOTE
79-year-old male with a history of PAD and right lower extremity angiogram with SFA stenting and balloon angioplasty of the popliteal artery in 2019    Patient reports some "stiffness" in bilateral calves after walking ~2 blocks. He is active and walks most day. Recent duplex shows    RLE: >75% in-stent stenosis  Ankle/Brachial index: 0.62  Great toe pressure of 48 mmHg, within the non-diabetic healing range. 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 Ankle/Brachial index: 0.66   Great toe pressure of 42 mmHg,    I've noticed over his past few duplexes the PSV in stent has steadily increased from 184 to 248 to 330 and now it is 401 cm/sec    This is a sign of impending failure which I discussed with the patient. I would like to have him undergo a right lower extremity angiogram with angioplasty of the stent for maintenance. We discussed the risks which include bleeding, infection, embolization, restenosis, dissection.   Patient understands and is agreeable

## 2023-08-24 NOTE — PROGRESS NOTES
Assessment/Plan:    Atherosclerosis of native artery of both lower extremities with intermittent claudication [facility])  79-year-old male with a history of PAD and right lower extremity angiogram with SFA stenting and balloon angioplasty of the popliteal artery in 2019    Patient reports some "stiffness" in bilateral calves after walking ~2 blocks. He is active and walks most day. Recent duplex shows    RLE: >75% in-stent stenosis  Ankle/Brachial index: 0.62  Great toe pressure of 48 mmHg, within the non-diabetic healing range. 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 Ankle/Brachial index: 0.66   Great toe pressure of 42 mmHg,    I've noticed over his past few duplexes the PSV in stent has steadily increased from 184 to 248 to 330 and now it is 401 cm/sec    This is a sign of impending failure which I discussed with the patient. I would like to have him undergo a right lower extremity angiogram with angioplasty of the stent for maintenance. We discussed the risks which include bleeding, infection, embolization, restenosis, dissection. Patient understands and is agreeable       Diagnoses and all orders for this visit:    Atherosclerosis of native artery of both lower extremities with intermittent claudication [facility])  -     Case request operating room: right lower extremity arteriogram, angioplasty, possible stenting; Standing  -     Type and screen; Future  -     Protime-INR; Future  -     CBC and Platelet; Future  -     Basic metabolic panel; Future  -     Case request operating room: right lower extremity arteriogram, angioplasty, possible stenting    Other orders  -     Diet NPO; Sips with meds; Standing  -     Void on call to OR; Standing  -     Insert peripheral IV; Standing  -     Nursing Communication CHG bath, have staff wash entire body (neck down) per pre op bathing protocol.  Routine, evening prior to, and day of surgery.; Standing  -     Nursing Communication Swab both nares with Povidone-Iodine solution, EXCLUDE if patient has shellfish/Iodine allergy, and replace with nasal alcohol swabstick. Routine, day of surgery, on call to OR.; Standing  -     chlorhexidine (PERIDEX) 0.12 % oral rinse 15 mL  -     Place sequential compression device; Standing  -     ceFAZolin (ANCEF) 1,000 mg in dextrose 5 % 100 mL IVPB          Subjective:      Patient ID: José Miguel Hernandez is a 79 y.o. male. Patient presents for review of CARSON done 8/8/2023. He c/o stiffness in BLE calves walking. He denies wounds. He is taking ASA 81 mg and Simvastatin. HPI    The following portions of the patient's history were reviewed and updated as appropriate: allergies, current medications, past family history, past medical history, past social history, past surgical history and problem list.    I have spent a total time of 35 minutes on 08/24/23 in caring for this patient including Diagnostic results, Prognosis, Risks and benefits of tx options, Instructions for management, Patient and family education, Importance of tx compliance, Risk factor reductions, Impressions, Counseling / Coordination of care, Documenting in the medical record, Reviewing / ordering tests, medicine, procedures   and Obtaining or reviewing history  . Review of Systems   Constitutional: Negative. HENT: Negative. Eyes: Negative. Respiratory: Negative. Cardiovascular: Negative. Gastrointestinal: Negative. Endocrine: Negative. Genitourinary: Negative. Musculoskeletal: Negative. Skin: Negative. Allergic/Immunologic: Negative. Neurological: Negative. Hematological: Negative. Psychiatric/Behavioral: Negative.           Objective:      /90 (BP Location: Left arm, Patient Position: Sitting, Cuff Size: Standard)   Pulse 68   Ht 5' 6" (1.676 m)   Wt 76.7 kg (169 lb)   SpO2 98%   BMI 27.28 kg/m²          Physical Exam  Constitutional:       Appearance: Normal appearance. HENT:      Head: Normocephalic and atraumatic. Eyes:      Extraocular Movements: Extraocular movements intact. Pupils: Pupils are equal, round, and reactive to light. Cardiovascular:      Rate and Rhythm: Normal rate and regular rhythm. Pulses:           Femoral pulses are 2+ on the right side and 2+ on the left side. Dorsalis pedis pulses are 2+ on the right side and 2+ on the left side. Pulmonary:      Effort: Pulmonary effort is normal.      Breath sounds: Normal breath sounds. Abdominal:      Palpations: Abdomen is soft. Tenderness: There is no abdominal tenderness. Musculoskeletal:         General: Normal range of motion. Cervical back: Normal range of motion. Neurological:      General: No focal deficit present. Mental Status: He is oriented to person, place, and time. Psychiatric:         Mood and Affect: Mood normal.         Operative Scheduling Information:    Hospital:  [facility]    Physician:  Lexi Miner    Surgery: Right lower extremity angiogram, possible angioplasty, possible stent    Urgency:  Standard    Level:  Level 4: Outpatients to be scheduled for screening procedures and elective surgery that can be delayed for longer than one month without reasonable expectation of detriment to patient.     Case Length:  Normal    Post-op Bed:  Outpatient    OR Table:  Discovery    Equipment Needs:  Rep: Doreen sci    Medication Instructions:  Aspirin:   Continue (do not hold)    Hydration:  No    Contrast Allergy:  no

## 2023-08-24 NOTE — TELEPHONE ENCOUNTER
REMINDER: Under Reason For Call, comments MUST be formatted as:   (Surgeon's Initials) / (Procedure)      Special Instructions / FYI: [facility] per [facility]. Procedure: Right lower extremity angiogram possible angioplasty possible stent    Level: 4 - Route clearance(s) to [facility] Surgery Coordinator Pool    Allergies: Other    Instructions Given: Angiogram Instructions    Dialysis: Patient is not on dialysis. Return Visit Required Prior to Procedure: No.    Consent: I certify that patient has signed, printed, timed, and dated their surgery consent. I certify that the patient's LEGAL NAME and DATE OF BIRTH are written in the upper left corner on BOTH sides of the consent. I certify that BOTH sides of the completed surgery consent have been scanned into the patient's Epic chart by myself on 8/24/2023. Yes, I have LABELED the consent in Epic as Consent for Vascular Procedure. For Surgical Clearances     Levels   1-3   ROUTE this encounter to [facility] Clearance Pool (AND)   [facility] Surgery Coordinator Pool     Level   4   ROUTE this encounter to [facility] Surgery Coordinator Pool       HYDRATION CLEARANCES   ONLY ROUTE TO  [facility] Clearance Pool     Patient does not require any pre operative clearance. Yes, I have ROUTED this encounter to [facility] Surgery Coordinator and/or [facility] Clearance Pool.

## 2023-08-30 NOTE — TELEPHONE ENCOUNTER
Left message for patient to call us back so that we can schedule his procedure for 10-13-23 SLB/Hybrid LLF

## 2023-08-31 ENCOUNTER — PREP FOR PROCEDURE (OUTPATIENT)
Dept: VASCULAR SURGERY | Facility: CLINIC | Age: 83
End: 2023-08-31

## 2023-08-31 NOTE — TELEPHONE ENCOUNTER
Authorization requirements reviewed. Please refer to Asher Flores / Zane Stein number [id number] for case updates.

## 2023-08-31 NOTE — TELEPHONE ENCOUNTER
Verified patient's insurance   CONFIRMED - Patient's insurance is Medicare  Is patient requesting a call when authorization has been obtained? Patient did not request a call. Surgery Date: 10-13-23  Primary Surgeon: Hector Clarke // Sherrie Sanchez (NPI: [id number])  Assisting Surgeon: Not Applicable (N/A)  Facility: [facility] (Tax: [id number] / NPI: [id number])  Inpatient / Outpatient: Outpatient  Level: 4    Clearance Received: No clearance ordered. Consent Received: Yes, scanned into Epic on 8-24-23. Medication Hold / Last Dose: No Hold on ASA  IR Notified: Not Applicable (N/A)  Rep. Notified: Yes  Equipment Needs: Not Applicable (N/A)  Vas Lab Requested: Not Applicable (N/A)  Patient Contacted: 8-31-23    Diagnosis: I70.213  Procedure/ CPT Code(s): Angiogram // CPT: 59424, M9039489 and 11906 // Procedure to take place in OR [Auth/ Cert Based]    For varicose vein related procedures:   Last LEVDR: Not Applicable (N/A)  CEAP Classification: Not Applicable (N/A)  VCSS: Not Applicable (N/A)    Post Operative Date/ Time: TBD     *Please review medication hold(s), PATs, and check H&P with patient. *  PATIENT WAS MAILED SURGERY/SHOWERING/DISCHARGE/COVID INSTRUCTIONS AFTER REVIEWING WITH THEM VIA PHONE CALL.      Spoke to patient and his wife to schedule procedure LL

## 2023-10-04 NOTE — PRE-PROCEDURE INSTRUCTIONS
Pre-Surgery Instructions:   Medication Instructions   • amLODIPine (NORVASC) 2.5 mg tablet Take night before surgery   • aspirin (ECOTRIN LOW STRENGTH) 81 mg EC tablet Instructions provided by MD   • dorzolamide-timolol (COSOPT) 22.3-6.8 MG/ML ophthalmic solution Take day of surgery. • latanoprost (XALATAN) 0.005 % ophthalmic solution Take night before surgery   • metoprolol succinate (Toprol XL) 25 mg 24 hr tablet Take day of surgery. • multivitamin (THERAGRAN) TABS Stop taking 7 days prior to surgery. • simvastatin (ZOCOR) 20 mg tablet Take night before surgery    Medication instructions for day surgery reviewed. Please use only a sip of water to take your instructed medications. Avoid all over the counter vitamins, supplements and NSAIDS for one week prior to surgery per anesthesia guidelines. Tylenol is ok to take as needed. You will receive a call one business day prior to surgery with an arrival time and hospital directions. If your surgery is scheduled on a Monday, the hospital will be calling you on the Friday prior to your surgery. If you have not heard from anyone by 8pm, please call the hospital supervisor through the hospital  at [phone]. Glory Thomas [phone]). Do not eat or drink anything after midnight the night before your surgery, including candy, mints, lifesavers, or chewing gum. Do not drink alcohol 24hrs before your surgery. Try not to smoke at least 24hrs before your surgery. Follow the pre surgery showering instructions as listed in the [facility] Surgical Experience Booklet” or otherwise provided by your surgeon's office. Do not shave the surgical area 24 hours before surgery. Do not apply any lotions, creams, including makeup, cologne, deodorant, or perfumes after showering on the day of your surgery. No contact lenses, eye make-up, or artificial eyelashes. Remove nail polish, including gel polish, and any artificial, gel, or acrylic nails if possible.  Remove all jewelry including rings and body piercing jewelry. Wear causal clothing that is easy to take on and off. Consider your type of surgery. Keep any valuables, jewelry, piercings at home. Please bring any specially ordered equipment (sling, braces) if indicated. Arrange for a responsible person to drive you to and from the hospital on the day of your surgery. Visitor Guidelines discussed. Call the surgeon's office with any new illnesses, exposures, or additional questions prior to surgery. Please reference your [facility] Surgical Experience Booklet” for additional information to prepare for your upcoming surgery. Per surgeons instructions pt is to continue aspirin prior to surgery. Pt instructed to stop motrin, aleve, advil and vitamins one week prior to surgery. Pt and pt wife verbalized understanding of shower and med instructions.

## 2023-10-05 ENCOUNTER — APPOINTMENT (OUTPATIENT)
Dept: LAB | Facility: CLINIC | Age: 83
End: 2023-10-05
Payer: MEDICARE

## 2023-10-05 ENCOUNTER — TRANSCRIBE ORDERS (OUTPATIENT)
Dept: LAB | Facility: CLINIC | Age: 83
End: 2023-10-05

## 2023-10-05 DIAGNOSIS — I70.213 ATHEROSCLEROSIS OF NATIVE ARTERY OF BOTH LOWER EXTREMITIES WITH INTERMITTENT CLAUDICATION (HCC): ICD-10-CM

## 2023-10-05 DIAGNOSIS — I70.213 ATHEROSCLEROSIS OF NATIVE ARTERY OF BOTH LOWER EXTREMITIES WITH INTERMITTENT CLAUDICATION (HCC): Primary | ICD-10-CM

## 2023-10-05 LAB
ABO GROUP BLD: NORMAL
ANION GAP SERPL CALCULATED.3IONS-SCNC: 6 MMOL/L
BLD GP AB SCN SERPL QL: NEGATIVE
BUN SERPL-MCNC: 27 MG/DL (ref 5–25)
CALCIUM SERPL-MCNC: 9.1 MG/DL (ref 8.4–10.2)
CHLORIDE SERPL-SCNC: 104 MMOL/L (ref 96–108)
CO2 SERPL-SCNC: 30 MMOL/L (ref 21–32)
CREAT SERPL-MCNC: 1.19 MG/DL (ref 0.6–1.3)
ERYTHROCYTE [DISTWIDTH] IN BLOOD BY AUTOMATED COUNT: 13.8 % (ref 11.6–15.1)
GFR SERPL CREATININE-BSD FRML MDRD: 55 ML/MIN/1.73SQ M
GLUCOSE P FAST SERPL-MCNC: 106 MG/DL (ref 65–99)
HCT VFR BLD AUTO: 47.9 % (ref 36.5–49.3)
HGB BLD-MCNC: 15.2 G/DL (ref 12–17)
INR PPP: 0.99 (ref 0.84–1.19)
MCH RBC QN AUTO: 28.9 PG (ref 26.8–34.3)
MCHC RBC AUTO-ENTMCNC: 31.7 G/DL (ref 31.4–37.4)
MCV RBC AUTO: 91 FL (ref 82–98)
PLATELET # BLD AUTO: 170 THOUSANDS/UL (ref 149–390)
PMV BLD AUTO: 10.7 FL (ref 8.9–12.7)
POTASSIUM SERPL-SCNC: 4.1 MMOL/L (ref 3.5–5.3)
PROTHROMBIN TIME: 13 SECONDS (ref 11.6–14.5)
RBC # BLD AUTO: 5.26 MILLION/UL (ref 3.88–5.62)
RH BLD: POSITIVE
SODIUM SERPL-SCNC: 140 MMOL/L (ref 135–147)
SPECIMEN EXPIRATION DATE: NORMAL
WBC # BLD AUTO: 10.78 THOUSAND/UL (ref 4.31–10.16)

## 2023-10-05 PROCEDURE — 86900 BLOOD TYPING SEROLOGIC ABO: CPT

## 2023-10-05 PROCEDURE — 85610 PROTHROMBIN TIME: CPT

## 2023-10-05 PROCEDURE — 36415 COLL VENOUS BLD VENIPUNCTURE: CPT

## 2023-10-05 PROCEDURE — 85027 COMPLETE CBC AUTOMATED: CPT

## 2023-10-05 PROCEDURE — 86901 BLOOD TYPING SEROLOGIC RH(D): CPT

## 2023-10-05 PROCEDURE — 86850 RBC ANTIBODY SCREEN: CPT

## 2023-10-05 PROCEDURE — 80048 BASIC METABOLIC PNL TOTAL CA: CPT

## 2023-10-12 ENCOUNTER — ANESTHESIA EVENT (OUTPATIENT)
Dept: PERIOP | Facility: HOSPITAL | Age: 83
End: 2023-10-12
Payer: MEDICARE

## 2023-10-12 NOTE — ANESTHESIA PREPROCEDURE EVALUATION
Procedure:  right lower extremity arteriogram, angioplasty, possible stenting (Abdomen)    Relevant Problems   CARDIO   (+) Benign essential hypertension   (+) Hypercholesterolemia      GI/HEPATIC   (+) Inguinal hernia of right side with obstruction      /RENAL   (+) Prostate cancer (HCC)   (+) Stage 3a chronic kidney disease (HCC)      NEURO/PSYCH   (+) Atherosclerosis of native artery of both lower extremities with intermittent claudication (HCC)        Physical Exam    Airway    Mallampati score: II  TM Distance: >3 FB  Neck ROM: full     Dental   No notable dental hx     Cardiovascular  Rhythm: regular, Rate: normal    Pulmonary   Breath sounds clear to auscultation    Other Findings    4/19/23-NSR        Anesthesia Plan  ASA Score- 3     Anesthesia Type- general with ASA Monitors. Additional Monitors: arterial line. Airway Plan: LMA. Plan Factors-Exercise tolerance (METS): >4 METS. Chart reviewed. EKG reviewed. Imaging results reviewed. Existing labs reviewed. Patient summary reviewed. Patient is not a current smoker. Patient not instructed to abstain from smoking on day of procedure. Patient did not smoke on day of surgery. Obstructive sleep apnea risk education given perioperatively. Induction- intravenous. Postoperative Plan-     Informed Consent- Anesthetic plan and risks discussed with patient. I personally reviewed this patient with the CRNA. Discussed and agreed on the Anesthesia Plan with the CRNA. Darryl Kaur

## 2023-10-13 ENCOUNTER — APPOINTMENT (OUTPATIENT)
Dept: RADIOLOGY | Facility: HOSPITAL | Age: 83
End: 2023-10-13
Payer: MEDICARE

## 2023-10-13 ENCOUNTER — HOSPITAL ENCOUNTER (OUTPATIENT)
Facility: HOSPITAL | Age: 83
Setting detail: OUTPATIENT SURGERY
Discharge: HOME | End: 2023-10-13
Attending: SURGERY | Admitting: SURGERY
Payer: MEDICARE

## 2023-10-13 ENCOUNTER — ANESTHESIA (OUTPATIENT)
Dept: PERIOP | Facility: HOSPITAL | Age: 83
End: 2023-10-13
Payer: MEDICARE

## 2023-10-13 ENCOUNTER — TELEPHONE (OUTPATIENT)
Dept: VASCULAR SURGERY | Facility: CLINIC | Age: 83
End: 2023-10-13

## 2023-10-13 VITALS
HEIGHT: 65 IN | OXYGEN SATURATION: 99 % | BODY MASS INDEX: 28.16 KG/M2 | DIASTOLIC BLOOD PRESSURE: 83 MMHG | WEIGHT: 169 LBS | TEMPERATURE: 97.6 F | HEART RATE: 63 BPM | RESPIRATION RATE: 18 BRPM | SYSTOLIC BLOOD PRESSURE: 161 MMHG

## 2023-10-13 LAB — GLUCOSE SERPL-MCNC: 101 MG/DL (ref 65–140)

## 2023-10-13 PROCEDURE — C1760 CLOSURE DEV, VASC: HCPCS | Performed by: SURGERY

## 2023-10-13 PROCEDURE — C1894 INTRO/SHEATH, NON-LASER: HCPCS | Performed by: SURGERY

## 2023-10-13 PROCEDURE — C1769 GUIDE WIRE: HCPCS | Performed by: SURGERY

## 2023-10-13 PROCEDURE — 36245 INS CATH ABD/L-EXT ART 1ST: CPT | Performed by: SURGERY

## 2023-10-13 PROCEDURE — C1887 CATHETER, GUIDING: HCPCS | Performed by: SURGERY

## 2023-10-13 PROCEDURE — 76937 US GUIDE VASCULAR ACCESS: CPT

## 2023-10-13 PROCEDURE — 75710 ARTERY X-RAYS ARM/LEG: CPT | Performed by: SURGERY

## 2023-10-13 PROCEDURE — NC001 PR NO CHARGE: Performed by: SURGERY

## 2023-10-13 PROCEDURE — 82948 REAGENT STRIP/BLOOD GLUCOSE: CPT

## 2023-10-13 PROCEDURE — 99024 POSTOP FOLLOW-UP VISIT: CPT | Performed by: SURGERY

## 2023-10-13 DEVICE — MYNX CONTROL VCD 5F
Type: IMPLANTABLE DEVICE | Site: GROIN | Status: FUNCTIONAL
Brand: MYNX CONTROL

## 2023-10-13 RX ORDER — PROPOFOL 10 MG/ML
INJECTION, EMULSION INTRAVENOUS AS NEEDED
Status: DISCONTINUED | OUTPATIENT
Start: 2023-10-13 | End: 2023-10-13

## 2023-10-13 RX ORDER — AMLODIPINE BESYLATE 2.5 MG/1
1.25 TABLET ORAL
Status: DISCONTINUED | OUTPATIENT
Start: 2023-10-13 | End: 2023-10-13 | Stop reason: HOSPADM

## 2023-10-13 RX ORDER — FENTANYL CITRATE/PF 50 MCG/ML
12.5 SYRINGE (ML) INJECTION
Status: DISCONTINUED | OUTPATIENT
Start: 2023-10-13 | End: 2023-10-13 | Stop reason: HOSPADM

## 2023-10-13 RX ORDER — ACETAMINOPHEN 325 MG/1
975 TABLET ORAL EVERY 6 HOURS SCHEDULED
Status: DISCONTINUED | OUTPATIENT
Start: 2023-10-13 | End: 2023-10-13 | Stop reason: HOSPADM

## 2023-10-13 RX ORDER — METOPROLOL TARTRATE 5 MG/5ML
INJECTION INTRAVENOUS AS NEEDED
Status: DISCONTINUED | OUTPATIENT
Start: 2023-10-13 | End: 2023-10-13

## 2023-10-13 RX ORDER — DORZOLAMIDE HYDROCHLORIDE AND TIMOLOL MALEATE 20; 5 MG/ML; MG/ML
1 SOLUTION/ DROPS OPHTHALMIC 2 TIMES DAILY
Status: DISCONTINUED | OUTPATIENT
Start: 2023-10-13 | End: 2023-10-13 | Stop reason: HOSPADM

## 2023-10-13 RX ORDER — SODIUM CHLORIDE, SODIUM LACTATE, POTASSIUM CHLORIDE, CALCIUM CHLORIDE 600; 310; 30; 20 MG/100ML; MG/100ML; MG/100ML; MG/100ML
20 INJECTION, SOLUTION INTRAVENOUS CONTINUOUS
Status: DISCONTINUED | OUTPATIENT
Start: 2023-10-13 | End: 2023-10-13 | Stop reason: HOSPADM

## 2023-10-13 RX ORDER — ONDANSETRON 2 MG/ML
INJECTION INTRAMUSCULAR; INTRAVENOUS AS NEEDED
Status: DISCONTINUED | OUTPATIENT
Start: 2023-10-13 | End: 2023-10-13

## 2023-10-13 RX ORDER — SODIUM CHLORIDE, SODIUM LACTATE, POTASSIUM CHLORIDE, CALCIUM CHLORIDE 600; 310; 30; 20 MG/100ML; MG/100ML; MG/100ML; MG/100ML
50 INJECTION, SOLUTION INTRAVENOUS CONTINUOUS
Status: CANCELLED | OUTPATIENT
Start: 2023-10-13

## 2023-10-13 RX ORDER — HEPARIN SODIUM 200 [USP'U]/100ML
INJECTION, SOLUTION INTRAVENOUS
Status: COMPLETED | OUTPATIENT
Start: 2023-10-13 | End: 2023-10-13

## 2023-10-13 RX ORDER — FENTANYL CITRATE 50 UG/ML
INJECTION, SOLUTION INTRAMUSCULAR; INTRAVENOUS AS NEEDED
Status: DISCONTINUED | OUTPATIENT
Start: 2023-10-13 | End: 2023-10-13

## 2023-10-13 RX ORDER — LATANOPROST 50 UG/ML
1 SOLUTION/ DROPS OPHTHALMIC
Status: DISCONTINUED | OUTPATIENT
Start: 2023-10-13 | End: 2023-10-13 | Stop reason: HOSPADM

## 2023-10-13 RX ORDER — HYDROMORPHONE HCL IN WATER/PF 6 MG/30 ML
0.2 PATIENT CONTROLLED ANALGESIA SYRINGE INTRAVENOUS
Status: DISCONTINUED | OUTPATIENT
Start: 2023-10-13 | End: 2023-10-13 | Stop reason: HOSPADM

## 2023-10-13 RX ORDER — METOPROLOL SUCCINATE 25 MG/1
25 TABLET, EXTENDED RELEASE ORAL DAILY
Status: DISCONTINUED | OUTPATIENT
Start: 2023-10-13 | End: 2023-10-13 | Stop reason: HOSPADM

## 2023-10-13 RX ORDER — ACETAMINOPHEN 325 MG/1
650 TABLET ORAL EVERY 6 HOURS PRN
Status: DISCONTINUED | OUTPATIENT
Start: 2023-10-13 | End: 2023-10-13 | Stop reason: HOSPADM

## 2023-10-13 RX ORDER — EPHEDRINE SULFATE 50 MG/ML
INJECTION INTRAVENOUS AS NEEDED
Status: DISCONTINUED | OUTPATIENT
Start: 2023-10-13 | End: 2023-10-13

## 2023-10-13 RX ORDER — GLYCOPYRROLATE 0.2 MG/ML
INJECTION INTRAMUSCULAR; INTRAVENOUS AS NEEDED
Status: DISCONTINUED | OUTPATIENT
Start: 2023-10-13 | End: 2023-10-13

## 2023-10-13 RX ORDER — CEFAZOLIN SODIUM 1 G/50ML
1000 SOLUTION INTRAVENOUS ONCE
Status: COMPLETED | OUTPATIENT
Start: 2023-10-13 | End: 2023-10-13

## 2023-10-13 RX ORDER — LIDOCAINE HYDROCHLORIDE 10 MG/ML
INJECTION, SOLUTION EPIDURAL; INFILTRATION; INTRACAUDAL; PERINEURAL AS NEEDED
Status: DISCONTINUED | OUTPATIENT
Start: 2023-10-13 | End: 2023-10-13

## 2023-10-13 RX ORDER — CHLORHEXIDINE GLUCONATE ORAL RINSE 1.2 MG/ML
15 SOLUTION DENTAL ONCE
Status: COMPLETED | OUTPATIENT
Start: 2023-10-13 | End: 2023-10-13

## 2023-10-13 RX ORDER — PRAVASTATIN SODIUM 20 MG
20 TABLET ORAL
Status: DISCONTINUED | OUTPATIENT
Start: 2023-10-13 | End: 2023-10-13 | Stop reason: HOSPADM

## 2023-10-13 RX ORDER — ONDANSETRON 2 MG/ML
4 INJECTION INTRAMUSCULAR; INTRAVENOUS ONCE AS NEEDED
Status: DISCONTINUED | OUTPATIENT
Start: 2023-10-13 | End: 2023-10-13 | Stop reason: HOSPADM

## 2023-10-13 RX ORDER — ALBUMIN, HUMAN INJ 5% 5 %
SOLUTION INTRAVENOUS CONTINUOUS PRN
Status: DISCONTINUED | OUTPATIENT
Start: 2023-10-13 | End: 2023-10-13

## 2023-10-13 RX ORDER — SODIUM CHLORIDE, SODIUM LACTATE, POTASSIUM CHLORIDE, CALCIUM CHLORIDE 600; 310; 30; 20 MG/100ML; MG/100ML; MG/100ML; MG/100ML
INJECTION, SOLUTION INTRAVENOUS CONTINUOUS PRN
Status: DISCONTINUED | OUTPATIENT
Start: 2023-10-13 | End: 2023-10-13

## 2023-10-13 RX ADMIN — FENTANYL CITRATE 50 MCG: 50 INJECTION, SOLUTION INTRAMUSCULAR; INTRAVENOUS at 13:20

## 2023-10-13 RX ADMIN — ONDANSETRON 4 MG: 2 INJECTION INTRAMUSCULAR; INTRAVENOUS at 14:16

## 2023-10-13 RX ADMIN — SODIUM CHLORIDE, SODIUM LACTATE, POTASSIUM CHLORIDE, AND CALCIUM CHLORIDE: .6; .31; .03; .02 INJECTION, SOLUTION INTRAVENOUS at 13:30

## 2023-10-13 RX ADMIN — PHENYLEPHRINE HYDROCHLORIDE 100 MCG: 10 INJECTION INTRAVENOUS at 13:28

## 2023-10-13 RX ADMIN — CHLORHEXIDINE GLUCONATE 15 ML: 1.2 SOLUTION ORAL at 12:56

## 2023-10-13 RX ADMIN — PHENYLEPHRINE HYDROCHLORIDE 40 MCG/MIN: 10 INJECTION INTRAVENOUS at 13:27

## 2023-10-13 RX ADMIN — GLYCOPYRROLATE 0.4 MG: 0.2 INJECTION, SOLUTION INTRAMUSCULAR; INTRAVENOUS at 13:45

## 2023-10-13 RX ADMIN — FENTANYL CITRATE 50 MCG: 50 INJECTION, SOLUTION INTRAMUSCULAR; INTRAVENOUS at 13:36

## 2023-10-13 RX ADMIN — EPHEDRINE SULFATE 10 MG: 50 INJECTION INTRAVENOUS at 13:44

## 2023-10-13 RX ADMIN — EPHEDRINE SULFATE 5 MG: 50 INJECTION INTRAVENOUS at 14:05

## 2023-10-13 RX ADMIN — ALBUMIN (HUMAN): 12.5 INJECTION, SOLUTION INTRAVENOUS at 14:05

## 2023-10-13 RX ADMIN — PROPOFOL 30 MG: 10 INJECTION, EMULSION INTRAVENOUS at 13:51

## 2023-10-13 RX ADMIN — METOROPROLOL TARTRATE 3 MG: 5 INJECTION, SOLUTION INTRAVENOUS at 14:17

## 2023-10-13 RX ADMIN — PROPOFOL 20 MG: 10 INJECTION, EMULSION INTRAVENOUS at 14:10

## 2023-10-13 RX ADMIN — CEFAZOLIN SODIUM 1000 MG: 1 SOLUTION INTRAVENOUS at 13:30

## 2023-10-13 RX ADMIN — SODIUM CHLORIDE, SODIUM LACTATE, POTASSIUM CHLORIDE, AND CALCIUM CHLORIDE 20 ML/HR: .6; .31; .03; .02 INJECTION, SOLUTION INTRAVENOUS at 12:56

## 2023-10-13 RX ADMIN — NOREPINEPHRINE BITARTRATE 16 MCG: 1 INJECTION INTRAVENOUS at 13:46

## 2023-10-13 RX ADMIN — PROPOFOL 150 MG: 10 INJECTION, EMULSION INTRAVENOUS at 13:20

## 2023-10-13 RX ADMIN — SODIUM CHLORIDE, SODIUM LACTATE, POTASSIUM CHLORIDE, AND CALCIUM CHLORIDE: .6; .31; .03; .02 INJECTION, SOLUTION INTRAVENOUS at 13:20

## 2023-10-13 RX ADMIN — LIDOCAINE HYDROCHLORIDE 50 MG: 10 INJECTION, SOLUTION EPIDURAL; INFILTRATION; INTRACAUDAL; PERINEURAL at 13:20

## 2023-10-13 NOTE — OP NOTE
OPERATIVE REPORT  PATIENT NAME: Rodolfo Irene    :  1940  MRN: [id number]  Pt Location: [facility] OR ROOM 02    SURGERY DATE: 10/13/2023    Surgeon(s) and Role:     * Geneva Amanda MD - Primary     * Jessie Parekh DO - Assisting     * Anya Soriano MD - Assisting    Preop Diagnosis:  Atherosclerosis of native artery of both lower extremities with intermittent claudication ([address]) [I70.213]    Post-Op Diagnosis Codes:     * Atherosclerosis of native artery of both lower extremities with intermittent claudication ([address]) [I70.213]    Procedure(s):  right lower extremity arteriogram    Specimen(s):  * No specimens in log *    Estimated Blood Loss:   Minimal    Drains:  * No LDAs found *    Anesthesia Type:   Choice    Operative Indications: Atherosclerosis of native artery of both lower extremities with intermittent claudication [facility]) [I70.213]    78 y/o male with PMH HTN, HLD, Prostate Ca s/p Radical Prostatectomy, RLE Angio c SFA SES c AT PTA (2019), RLE Angio c SFA ISR DCB c SFA SES c Pop/AT PTA presenting with RLE Claudication and duplex evidence of in-stent restenosis. Discussed risk and benefits of intervention. Complications:   None    Procedure and Technique:    Pre-Op Dx : right lower extremity arterial insufficiency   Post-Op Dx: Same  Procedure:   1. Ultrasound-guided left  common Femoral Artery Access  2. Aortogram with pelvic runoff  3. right LE Runoff  4. Second order selective arterial catheterization   5. Right CFA 5Fr sheath placement   6. Closure of left femoral artery access site with Mynx closure device  7. Supervision and radiologic interpretation of all radiographic imaging    Surgeon: Bam Medina MD  Assistant (s): Ramona Cordova DO  Anesthesia : General Anesthesia   Findings: see below  Comp: None  EBL: Minimal    Indications:    RADIOGRAPHIC SUPERVISION AND INTERPRETATION OF TEST:    1.   Abdominal aortogram findings -   Abdominal aorta - Patent without significant stenosis or aneurysm     2. Pelvic runoff findings -   Left common iliac artery- Patent without significant stenosis   Left internal iliac artery- Patent without significant stenosis   Left external iliac artery- Patent without significant stenosis     Right common iliac artery- Patent without significant stenosis   Right internal iliac artery- Patent without significant stenosis   Right external iliac artery- Patent without significant stenosis     2. Left lower extremity angiogram findings-    common femoral artery- Patent without significant stenosis    Profunda femorals artery- Patent without significant stenosis   Superficial femoral artery- Proximal occlusion vs high grade stenosis     3. Right lower extremity angiogram findings-    common femoral artery- Patent with significant common femoral atherosclerosis disease causing flow limiting stenosis. Profunda femorals artery- Patent with high grade atherosclerosis disease causing flow limiting stenosis vs occlusion   Superficial femoral artery- Patent proximally with focal high grade proximal stenosis. The proximal SFA stent has a high grade near occlusive stenosis. The SFA stent has diffuse diease with a focal high grade stenosis in the distal end point. Popliteal artery- Patent with high grade focal stenosis of P3  Anterior tibial artery- Patent with high grade focal stenosis of the ostium. Patent flow distally with in-line flow to the foot. Tibioperoneal trunk- occluded   Posterior tibial artery- occluded   Peroneal artery- occluded proximally with distal reconstitution   Plantar arch- Not visualized       Contrast Type/Amount -  32 CC VISIPAQUE 320    Fluoro Time (Mandatory) -  10.2 MIN      Indication: H& P reviewed and imaging reviewed per EMR. The patient was brought to the angio suite. After the placement of monitoring devices, a timeout was performed and conscious sedation was administered.  Both groins were prepped and draped in normal sterile fashion. Local anesthesia was infiltrated in the skin and subcutaneous tissues of the left groin and the left femoral artery was cannulated with a 21G micropuncture needle under ultrasound guidance. Wire was advanced under imaging. After obtaining pulsatile flow, a micro guidewire was inserted through the needle and the access site was enlarged with a #11 scalpel. A micropuncture sheath was placed and angiogram performed to confirm adequate access site above the femoral bifurcation and below the external iliac artery. Hard copy image was stored in PACS. A 5 Danish sheath was advanced over the wire and the wire removed. A Matomy Money wire was advanced into the abdominal aorta and an aortogram was performed utilizing the Omni Flush Catheter. Run-off films were obtained by selecting the iliac system using the Omni flush catheter. There was no intervention. Next a short sheath was exchanged / placed over a wire and secured after flushing an aspirating. A Mynx closure device was utilized. Patient tolerated procedure well; is going to PACU. At the end of the case, patient's bilateral feet were well perfused and had good capillary refill. Dr. Serina Aguillon was present for all steps of the procedure. Patient Disposition:  PACU         SIGNATURE: Abdon Mix, DO  DATE: October 13, 2023  TIME: 2:33 PM        Vascular Quality Initiative - Peripheral Vascular Intervention     Urgency: Elective    Functional Status:  Fully active; able to carry on all predisease activities without restriction. Ambulation: Amb = independently ambulatory    Leg Symptoms    Right: Severe Claudication:  ischemic limb muscle pain that limits walking < 1 block (<300 feet or 1 football field);  Ischemic Rest Pain: pain in the distal foot at rest felt to be due to limited arterial perfusion  Left: Asymptomatic:  documented peripheral arterial disease without symptoms of claudication or ischemic pain      Treatment of Native Artery to Maintain Bypass Patency?:  Yes    COVID Information  COVID Symptoms Pre-Procedure: Asymptomatic    Treatment Delayed by Pandemic: None    Access   Number of Sites: 1     Access Site 1:     Side 1: Left    Site 1: Femoral Retrograde    Access Guidance 1:U/S    Largest Sheath Size 1: 5 Fr.     Closure Device 1: MynxGrip      Number of Closure Devices: 1     Closure Device Outcome: Closure device successful         Procedure  Fluoro Time: 10.2 minutes  Contrast Volume: Visipaque 32 ml  DAP: 28.67 Gy.cm2  CO2: no  Anticoagulant: none  If Creatinine is > 1.2 or missing, AUREA Prophylaxis none     Treatment Details  Indication: Occlusive Disease,  None     Post Procedure  Patient currently taking: Statin, Yes      Antiplatelet Medication, Yes    Procedure Complications: No

## 2023-10-13 NOTE — H&P
H&P Exam - Vascular Surgery   Roni Pool 79 y.o. male MRN: [id number]  Unit/Bed#: [facility] Encounter: [id number]    Assessment/Plan     Assessment/Plan:  16-year-old male with a history of PAD and right lower extremity angiogram with SFA stenting and balloon angioplasty of the popliteal artery in 2019     Patient reports some "stiffness" in bilateral calves after walking ~2 blocks. He is active and walks most day. Recent duplex shows the PSV in stent has steadily increased from 184 to 248 to 330 and now it is 401 cm/sec     This is a sign of impending failure which I discussed with the patient. I would like to have him undergo a right lower extremity angiogram with angioplasty of the stent for maintenance. We discussed the risks which include bleeding, infection, embolization, restenosis, dissection. Patient understands and is agreeable    History of Present Illness   HPI:  Roni Pool is a 79 y.o. male who presents with impending stent failure and claudication. 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, left     LAST ASSESSED: 27OCT2015     Past Surgical History:   Procedure Laterality Date    COLONOSCOPY      DENTAL SURGERY      HERNIA REPAIR      IR AORTAGRAM WITH RUN-OFF  09/20/2019    IR AORTAGRAM WITH RUN-OFF  11/16/2020    MA ENDOVEN ABLTJ INCMPTNT VEIN XTR LASER 1ST VEIN Right 07/14/2020    Procedure: ENDOVASCULAR LASER THERAPY (EVLT);   Surgeon: Barrie Doshi MD;  [facility];  Service: Vascular    MA RPR 1ST INGUN HRNA AGE 5 YRS/> REDUCIBLE Right 12/07/2016    Procedure: REPAIR HERNIA INGUINAL; HYDROCELECTOMY;  Surgeon: Fransisco Rod Ephraim Lang MD;  [facility];  Service: General    AR Larey Cassette 3RD+ ORD [address] PEL/LXTR [facility] Right 11/16/2020    Procedure: ARTERIOGRAM, DIAGNOSTIC AORTO ILIAC, RIGHT LOWER EXTREMITY  ANGIOGRAM, ANGIOPLASTY AND STENTING OF SFA/POPLITEAL, ANGIOPLASTY OF ANTERIOTIBIAL/POPLITEAL AND ATHERECTOMY, ANGIOPLASTY OF TIBIOPERINEAL TRUNK, MYNX CLOSURE;  Surgeon: Brenna Petty MD;  [facility];  Service: Vascular    PROSTATE SURGERY  2014    PROSTATECTOMY RADICAL; LAST ASSESSED: 27OCT2015    SKIN CANCER EXCISION      TRANSURETHRAL RESECTION OF PROSTATE  2011    VASCULAR SURGERY       Social History   Social History     Substance and Sexual Activity   Alcohol Use Not Currently     Social History     Substance and Sexual Activity   Drug Use Never     Social History     Tobacco Use   Smoking Status Never   Smokeless Tobacco Never     E-Cigarette/Vaping    E-Cigarette Use Never User      E-Cigarette/Vaping Substances    Nicotine No     THC No     CBD No     Flavoring No     Other No     Unknown No      Family History: non-contributory    Meds/Allergies   all current active meds have been reviewed  Allergies   Allergen Reactions    Other Itching and Rash     SESAME SEEDS,SOB       Objective   Vitals: There were no vitals taken for this visit. ,There is no height or weight on file to calculate BMI. No intake or output data in the 24 hours ending 10/13/23 1212  Invasive Devices       None                   Physical Exam  Vitals and nursing note reviewed. Constitutional:       General: He is not in acute distress. Appearance: He is well-developed. HENT:      Head: Normocephalic and atraumatic. Eyes:      Conjunctiva/sclera: Conjunctivae normal.   Cardiovascular:      Rate and Rhythm: Normal rate and regular rhythm. Heart sounds: No murmur heard. Pulmonary:      Effort: Pulmonary effort is normal. No respiratory distress. Breath sounds: Normal breath sounds.    Abdominal:      Palpations: Abdomen is soft. Tenderness: There is no abdominal tenderness. Musculoskeletal:      Cervical back: Neck supple. Skin:     General: Skin is warm and dry. Capillary Refill: Capillary refill takes less than 2 seconds. Neurological:      Mental Status: He is alert. Psychiatric:         Mood and Affect: Mood normal.         Lab Results: I have personally reviewed pertinent reports. Imaging: I have personally reviewed pertinent reports. EKG, Pathology, and Other Studies: I have personally reviewed pertinent reports.       Code Status: Prior  Advance Directive and Living Will:      Power of :    POLST:      Counseling / Coordination of Care  None

## 2023-10-13 NOTE — ANESTHESIA POSTPROCEDURE EVALUATION
Post-Op Assessment Note    CV Status:  Stable  Pain Score: 0    Pain management: adequate     Mental Status:  Alert and awake   Hydration Status:  Euvolemic   PONV Controlled:  Controlled   Airway Patency:  Patent      Post Op Vitals Reviewed: Yes      Staff: CRNA         No notable events documented.     BP  143/63   Temp  98.3   Pulse  73   Resp   16   SpO2   100

## 2023-10-13 NOTE — TELEPHONE ENCOUNTER
From: [email or web address]   Sent: Friday, October 13, 2023 2:23 PM  To: Vascular Surgery Schedulers [email or web address]  Subject: Lefty Tolbert [birth year]    Hi,    Can we schedule this patient for a right femoral endarterectomy and right lower extremity angiogram/angioplasty/stenting as a level 3. I just did his angio but was not therapeutic. He's schedule to see me on 11/2 in the office. Let's keep that appointment please. Noreen Black M.D. Vascular Surgeon  [address] [place] [address]    Replied back asking if we should be starting any type of clearances for surgery before his return office visit 11/2/23.

## 2023-10-13 NOTE — DISCHARGE INSTR - AVS FIRST PAGE
DISCHARGE INSTRUCTIONS  ARTERIOGRAM/ANGIOPLASTY/STENT    ACTIVITY: On the evening following the procedure, you should be mostly resting. Someone should remain with you during the evening and overnight following the procedure. On the day after your procedure, limit your activity to walking. Avoid heavy lifting (no more than 15 lbs) for the first three days. Walking up steps and normal activities may be resumed as you feel ready. You should not drive a car for at least two days following discharge from the hospital. You may ride in a car. If you have any questions regarding a particular activity, please discuss with your doctor or nurse before you are discharged. DIET:  Resume your normal diet. Drink more water than usual for the next 24 hours. PROCEDURE SITE: You may have a procedure site in your groin, arm, or foot. You may have surgical glue at your procedure site. The glue is used to cover the procedure site, assist in closure, and prevent contamination. This adhesive will darken and peel away on its own within one to two weeks. Do not pick at it. You should shower daily. Wash incision daily with soap and water, but do not rub or scrub the incision; rinse thoroughly and pat dry. Do not bathe in a tub or swim for the first 2 week following your procedure or if you have any open wounds. It is normal to have some bruising, swelling or discoloration around the procedure site. IF increasing redness, pain, or a bulge develops, call our office immediately. If present, you may remove the band-aid or “steri-strips” over your procedure site after two days. If you notice any active bleeding at the site, apply pressure to the site and call our office ([phone]) or 556. FOLLOW UP STUDIES:  Your doctor will discuss whether further treatments or follow-up studies are necessary at your first post procedure visit.     FOLLOW UP APPOINTMENTS:  Making and keeping follow up appointments and ultrasound tests are important to your recovery. If you have difficulty making it to or keeping your follow up appointments, call the office. If you have increased pain, fever >101.5, increased drainage, redness or a bad smell at your surgery site, new coldness/numbness of your arm or leg, please call us immediately and GO directly to the ER. PLEASE CALL THE OFFICE IF YOU HAVE ANY QUESTIONS  [phone]  [phone] [address] [facility] [address], [place] ([place]), [address] [facility]  [address] [facility], [address]  [address], [facility], [address] [facility]  [address] [facility], [address] [facility], [address]  [address] [facility],Sixth Floor, 1st Floor, [place], [address]  [address], [address], [address], [place], [address] [facility] [place]  [address], [address] [place] [address] [place] ([place]), [address] [facility]  1501 [facility], 319 [facility], [address] [place] [address] [address] [place] [address] [address], [place]  [address]

## 2023-11-02 ENCOUNTER — OFFICE VISIT (OUTPATIENT)
Dept: VASCULAR SURGERY | Facility: CLINIC | Age: 83
End: 2023-11-02
Payer: MEDICARE

## 2023-11-02 VITALS
WEIGHT: 165 LBS | SYSTOLIC BLOOD PRESSURE: 150 MMHG | DIASTOLIC BLOOD PRESSURE: 82 MMHG | OXYGEN SATURATION: 99 % | HEART RATE: 74 BPM | HEIGHT: 65 IN | BODY MASS INDEX: 27.49 KG/M2

## 2023-11-02 DIAGNOSIS — I73.9 PERIPHERAL VASCULAR DISEASE, UNSPECIFIED (HCC): Primary | ICD-10-CM

## 2023-11-02 DIAGNOSIS — I70.213 ATHEROSCLEROSIS OF NATIVE ARTERY OF BOTH LOWER EXTREMITIES WITH INTERMITTENT CLAUDICATION (HCC): ICD-10-CM

## 2023-11-02 PROCEDURE — 99214 OFFICE O/P EST MOD 30 MIN: CPT | Performed by: SURGERY

## 2023-11-02 RX ORDER — CLOPIDOGREL BISULFATE 75 MG/1
75 TABLET ORAL DAILY
Qty: 90 TABLET | Refills: 3 | Status: SHIPPED | OUTPATIENT
Start: 2023-11-02

## 2023-11-02 RX ORDER — GENTAMICIN SULFATE 1 MG/G
OINTMENT TOPICAL
COMMUNITY
Start: 2023-09-25

## 2023-11-02 NOTE — PROGRESS NOTES
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 No invention was done because of this. Discussed with the patient and explained the pathology that was seen. Normally we would offer a femoral endarterectomy with concomitant lower extremity angiogram however the patient's symptoms are not that significant and would like to hold off for now. We will plan for a repeat duplex in 6 months and can evaluate to see if the in-stent stenosis is continuing to worsen. If this does worsen, for both symptom relief and for stent maintenance may need to consider pursuing intervention. Will add plavix to his medications in the mean time    . Matt Haney ... Diagnoses and all orders for this visit:    Peripheral vascular disease, unspecified ([address])  -     clopidogrel (Plavix) 75 mg tablet; Take 1 tablet (75 mg total) by mouth daily    Atherosclerosis of native artery of both lower extremities with intermittent claudication (HCC)  -     VAS lower limb arterial duplex, complete bilateral; Future    Other orders  -     gentamicin (GARAMYCIN) 0.1 % ointment          Subjective:      Patient ID: Deandra Titus is a 79 y.o. male. Patient is here today to review results of a Right lower extremity angiogram with no intervention done 10/13/2023. Patient's left groin access site is clean and dry and healing well. Patient c/o continued tightness in both calves when walking 2 blocks. When he stops to rest the pain subsides. He denies any open wounds. Patient is taking ASA 81 mg and Simvastatin. Patient is a non-smoker. HPI    The following portions of the patient's history were reviewed and updated as appropriate: allergies, current medications, past family history, past medical history, past social history, past surgical history, and problem list.    I have spent a total time of 35 minutes on 11/02/23 in caring for this patient including Diagnostic results, Prognosis, Risks and benefits of tx options, Instructions for management, Patient and family education, Importance of tx compliance, Risk factor reductions, Impressions, Counseling / Coordination of care, Documenting in the medical record, Reviewing / ordering tests, medicine, procedures  , and Obtaining or reviewing history  . Review of Systems   Constitutional: Negative. HENT: Negative. Eyes: Negative. Respiratory: Negative. Cardiovascular: Negative. Gastrointestinal: Negative. Endocrine: Negative. Genitourinary: Negative. Musculoskeletal:         Pain when walking   Skin: Negative. Allergic/Immunologic: Positive for food allergies (sesame seeds). Neurological: Negative. Hematological: Negative. Psychiatric/Behavioral: Negative. Objective:      /82 (BP Location: Left arm, Patient Position: Sitting, Cuff Size: Standard)   Pulse 74   Ht 5' 5" (1.651 m)   Wt 74.8 kg (165 lb)   SpO2 99%   BMI 27.46 kg/m²          Physical Exam  Constitutional:       Appearance: Normal appearance. HENT:      Head: Normocephalic and atraumatic. Eyes:      Extraocular Movements: Extraocular movements intact. Pupils: Pupils are equal, round, and reactive to light. Cardiovascular:      Rate and Rhythm: Normal rate and regular rhythm. Pulses:           Femoral pulses are 2+ on the right side and 2+ on the left side. Comments: Left groin puncture site soft  Pulmonary:      Effort: Pulmonary effort is normal.      Breath sounds: Normal breath sounds. Abdominal:      Palpations: Abdomen is soft. Tenderness:  There is no abdominal tenderness. Musculoskeletal:         General: Normal range of motion. Cervical back: Normal range of motion. Neurological:      General: No focal deficit present. Mental Status: He is oriented to person, place, and time.    Psychiatric:         Mood and Affect: Mood normal.

## 2023-11-02 NOTE — ASSESSMENT & PLAN NOTE
58-year-old male with a history of PAD and right lower extremity angiogram with SFA stenting and balloon angioplasty of the popliteal artery in 2019 and known left SFA occlusion now s/p RLE diagnostic angiogram on 10/13/23    The angiogram showed disease throughout the SFA stents were also showed a large calcific plaque within the common femoral artery. No invention was done because of this. Discussed with the patient and explained the pathology that was seen. Normally we would offer a femoral endarterectomy with concomitant lower extremity angiogram however the patient's symptoms are not that significant and would like to hold off for now. We will plan for a repeat duplex in 6 months and can evaluate to see if the in-stent stenosis is continuing to worsen. If this does worsen, for both symptom relief and for stent maintenance may need to consider pursuing intervention. Will add plavix to his medications in the mean time    . Sindi Crockett ...

## 2023-11-20 ENCOUNTER — HOSPITAL ENCOUNTER (INPATIENT)
Facility: HOSPITAL | Age: 83
LOS: 1 days | Discharge: HOME | DRG: 151 | End: 2023-11-21
Attending: SURGERY | Admitting: SURGERY
Payer: MEDICARE

## 2023-11-20 ENCOUNTER — APPOINTMENT (EMERGENCY)
Dept: RADIOLOGY | Facility: HOSPITAL | Age: 83
DRG: 151 | End: 2023-11-20
Payer: MEDICARE

## 2023-11-20 ENCOUNTER — TELEPHONE (OUTPATIENT)
Dept: FAMILY MEDICINE CLINIC | Facility: CLINIC | Age: 83
End: 2023-11-20

## 2023-11-20 DIAGNOSIS — R04.0 BLEEDING FROM THE NOSE: Primary | ICD-10-CM

## 2023-11-20 DIAGNOSIS — S63.115A: ICD-10-CM

## 2023-11-20 DIAGNOSIS — S61.012A LACERATION OF LEFT THUMB WITHOUT FOREIGN BODY WITHOUT DAMAGE TO NAIL, INITIAL ENCOUNTER: ICD-10-CM

## 2023-11-20 DIAGNOSIS — I10 BENIGN ESSENTIAL HYPERTENSION: ICD-10-CM

## 2023-11-20 DIAGNOSIS — S61.012A LACERATION OF LEFT THUMB, FOREIGN BODY PRESENCE UNSPECIFIED, NAIL DAMAGE STATUS UNSPECIFIED, INITIAL ENCOUNTER: ICD-10-CM

## 2023-11-20 DIAGNOSIS — R04.0 EPISTAXIS DUE TO TRAUMA: ICD-10-CM
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
## 2023-11-20 PROBLEM — S02.2XXA NASAL BONE FRACTURE: Status: ACTIVE | Noted: 2023-11-20

## 2023-11-20 LAB
BASE EXCESS BLDA CALC-SCNC: 0 MMOL/L (ref -2–3)
CA-I BLD-SCNC: 1.18 MMOL/L (ref 1.12–1.32)
GLUCOSE SERPL-MCNC: 152 MG/DL (ref 65–140)
HCO3 BLDA-SCNC: 25.2 MMOL/L (ref 24–30)
HCT VFR BLD CALC: 43 % (ref 36.5–49.3)
HGB BLDA-MCNC: 14.6 G/DL (ref 12–17)
PCO2 BLD: 27 MMOL/L (ref 21–32)
PCO2 BLD: 43 MM HG (ref 42–50)
PH BLD: 7.38 [PH] (ref 7.3–7.4)
PO2 BLD: 29 MM HG (ref 35–45)
POTASSIUM BLD-SCNC: 4 MMOL/L (ref 3.5–5.3)
SAO2 % BLD FROM PO2: 54 % (ref 60–85)
SODIUM BLD-SCNC: 141 MMOL/L (ref 136–145)
SPECIMEN SOURCE: ABNORMAL

## 2023-11-20 PROCEDURE — 71045 X-RAY EXAM CHEST 1 VIEW: CPT

## 2023-11-20 PROCEDURE — EDAIR PR ED AIR: Performed by: EMERGENCY MEDICINE

## 2023-11-20 PROCEDURE — 90471 IMMUNIZATION ADMIN: CPT

## 2023-11-20 PROCEDURE — 73130 X-RAY EXAM OF HAND: CPT

## 2023-11-20 PROCEDURE — 90715 TDAP VACCINE 7 YRS/> IM: CPT | Performed by: PHYSICIAN ASSISTANT

## 2023-11-20 PROCEDURE — 76705 ECHO EXAM OF ABDOMEN: CPT | Performed by: SURGERY

## 2023-11-20 PROCEDURE — 0HQGXZZ REPAIR LEFT HAND SKIN, EXTERNAL APPROACH: ICD-10-PCS

## 2023-11-20 PROCEDURE — 99285 EMERGENCY DEPT VISIT HI MDM: CPT

## 2023-11-20 PROCEDURE — 82803 BLOOD GASES ANY COMBINATION: CPT

## 2023-11-20 PROCEDURE — 70486 CT MAXILLOFACIAL W/O DYE: CPT

## 2023-11-20 PROCEDURE — 85014 HEMATOCRIT: CPT

## 2023-11-20 PROCEDURE — 70450 CT HEAD/BRAIN W/O DYE: CPT

## 2023-11-20 PROCEDURE — 93308 TTE F-UP OR LMTD: CPT | Performed by: SURGERY

## 2023-11-20 PROCEDURE — 82330 ASSAY OF CALCIUM: CPT

## 2023-11-20 PROCEDURE — 84132 ASSAY OF SERUM POTASSIUM: CPT

## 2023-11-20 PROCEDURE — 82947 ASSAY GLUCOSE BLOOD QUANT: CPT

## 2023-11-20 PROCEDURE — 99223 1ST HOSP IP/OBS HIGH 75: CPT | Performed by: SURGERY

## 2023-11-20 PROCEDURE — 72125 CT NECK SPINE W/O DYE: CPT

## 2023-11-20 PROCEDURE — 84295 ASSAY OF SERUM SODIUM: CPT

## 2023-11-20 PROCEDURE — 12002 RPR S/N/AX/GEN/TRNK2.6-7.5CM: CPT | Performed by: SURGERY

## 2023-11-20 RX ORDER — DORZOLAMIDE HYDROCHLORIDE AND TIMOLOL MALEATE 20; 5 MG/ML; MG/ML
1 SOLUTION/ DROPS OPHTHALMIC 2 TIMES DAILY
Status: DISCONTINUED | OUTPATIENT
Start: 2023-11-20 | End: 2023-11-21 | Stop reason: HOSPADM

## 2023-11-20 RX ORDER — OXYCODONE HYDROCHLORIDE 5 MG/1
5 TABLET ORAL EVERY 4 HOURS PRN
Status: DISCONTINUED | OUTPATIENT
Start: 2023-11-20 | End: 2023-11-21 | Stop reason: HOSPADM

## 2023-11-20 RX ORDER — METOPROLOL SUCCINATE 25 MG/1
25 TABLET, EXTENDED RELEASE ORAL DAILY
Status: DISCONTINUED | OUTPATIENT
Start: 2023-11-21 | End: 2023-11-20

## 2023-11-20 RX ORDER — PRAVASTATIN SODIUM 20 MG
20 TABLET ORAL
Status: DISCONTINUED | OUTPATIENT
Start: 2023-11-21 | End: 2023-11-21 | Stop reason: HOSPADM

## 2023-11-20 RX ORDER — OXYCODONE HYDROCHLORIDE 10 MG/1
10 TABLET ORAL EVERY 4 HOURS PRN
Status: DISCONTINUED | OUTPATIENT
Start: 2023-11-20 | End: 2023-11-21 | Stop reason: HOSPADM

## 2023-11-20 RX ORDER — METOPROLOL SUCCINATE 25 MG/1
25 TABLET, EXTENDED RELEASE ORAL DAILY
Status: DISCONTINUED | OUTPATIENT
Start: 2023-11-20 | End: 2023-11-21 | Stop reason: HOSPADM

## 2023-11-20 RX ORDER — ACETAMINOPHEN 325 MG/1
650 TABLET ORAL EVERY 6 HOURS PRN
Status: DISCONTINUED | OUTPATIENT
Start: 2023-11-20 | End: 2023-11-21 | Stop reason: HOSPADM

## 2023-11-20 RX ORDER — OXYMETAZOLINE HYDROCHLORIDE 0.05 G/100ML
6 SPRAY NASAL ONCE
Status: COMPLETED | OUTPATIENT
Start: 2023-11-20 | End: 2023-11-20

## 2023-11-20 RX ORDER — LIDOCAINE HYDROCHLORIDE AND EPINEPHRINE 10; 10 MG/ML; UG/ML
10 INJECTION, SOLUTION INFILTRATION; PERINEURAL ONCE
Status: COMPLETED | OUTPATIENT
Start: 2023-11-20 | End: 2023-11-20

## 2023-11-20 RX ORDER — HYDRALAZINE HYDROCHLORIDE 20 MG/ML
5 INJECTION INTRAMUSCULAR; INTRAVENOUS EVERY 6 HOURS PRN
Status: DISCONTINUED | OUTPATIENT
Start: 2023-11-20 | End: 2023-11-21 | Stop reason: HOSPADM

## 2023-11-20 RX ORDER — ONDANSETRON 2 MG/ML
4 INJECTION INTRAMUSCULAR; INTRAVENOUS EVERY 4 HOURS PRN
Status: DISCONTINUED | OUTPATIENT
Start: 2023-11-20 | End: 2023-11-21 | Stop reason: HOSPADM

## 2023-11-20 RX ORDER — LATANOPROST 50 UG/ML
1 SOLUTION/ DROPS OPHTHALMIC
Status: DISCONTINUED | OUTPATIENT
Start: 2023-11-20 | End: 2023-11-21 | Stop reason: HOSPADM

## 2023-11-20 RX ORDER — AMLODIPINE BESYLATE 2.5 MG/1
1.25 TABLET ORAL
Status: DISCONTINUED | OUTPATIENT
Start: 2023-11-20 | End: 2023-11-21 | Stop reason: HOSPADM

## 2023-11-20 RX ADMIN — LIDOCAINE HYDROCHLORIDE,EPINEPHRINE BITARTRATE 10 ML: 10; .01 INJECTION, SOLUTION INFILTRATION; PERINEURAL at 15:28

## 2023-11-20 RX ADMIN — LATANOPROST 1 DROP: 50 SOLUTION OPHTHALMIC at 22:46

## 2023-11-20 RX ADMIN — METOPROLOL SUCCINATE 25 MG: 25 TABLET, FILM COATED, EXTENDED RELEASE ORAL at 22:47

## 2023-11-20 RX ADMIN — TETANUS TOXOID, REDUCED DIPHTHERIA TOXOID AND ACELLULAR PERTUSSIS VACCINE, ADSORBED 0.5 ML: 5; 2.5; 8; 8; 2.5 SUSPENSION INTRAMUSCULAR at 15:11

## 2023-11-20 RX ADMIN — OXYMETAZOLINE HYDROCHLORIDE 6 SPRAY: 0.05 SPRAY NASAL at 16:48

## 2023-11-20 RX ADMIN — AMLODIPINE BESYLATE 1.25 MG: 2.5 TABLET ORAL at 22:47

## 2023-11-20 NOTE — CONSULTS
OTOLARYNGOLOGY CONSULT    Date of Service: 11/20/2023      ASSESSMENT/PLAN:  Rohit Carlos is a 79 y.o. male who we are consulted on for epistaxis, nasal bone fracture     - recommend against repair of nasal bone fracture given age  - epistaxis controlled with R surgifoam, L 3cm merocel  - follow up for packing removal in ED Friday, Dr Doyle Moreno operating at [facility] can remove given holiday   - afrin PRN for ongoing bleeding   - anti staph ppx while packing in place. Please contact ENT Resident [place] Role for any questions or concerns. HPI  79yoM who presented after a fall with bilateral nasal bone fractures and epistaxis. ED attempted manual pressure x20 minutes without relief. He does take aspirin and plavix. He does endorse difficulty breathing through nose. Reports history of "broken nose" in the past.     [facility]  No current facility-administered medications for this encounter. Current Outpatient Medications   Medication Sig Dispense Refill    clopidogrel (Plavix) 75 mg tablet Take 1 tablet (75 mg total) by mouth daily 90 tablet 3    amLODIPine (NORVASC) 2.5 mg tablet Take 1 tablet (2.5 mg total) by mouth daily at bedtime (Patient taking differently: Take 1.25 mg by mouth daily at bedtime) 90 tablet 1    aspirin (ECOTRIN LOW STRENGTH) 81 mg EC tablet opne po 2X a week, eg Monday and Friday.  (Patient taking differently: 3 (three) times a week)      dorzolamide-timolol (COSOPT) 22.3-6.8 MG/ML ophthalmic solution Administer to both eyes 2 (two) times a day      gentamicin (GARAMYCIN) 0.1 % ointment       latanoprost (XALATAN) 0.005 % ophthalmic solution Administer to both eyes daily at bedtime      metoprolol succinate (Toprol XL) 25 mg 24 hr tablet Take 1 tablet (25 mg total) by mouth daily 90 tablet 1    multivitamin (THERAGRAN) TABS Take 1 tablet by mouth daily      simvastatin (ZOCOR) 20 mg tablet TAKE 1 TABLET BY MOUTH EVERY DAY 90 tablet 1       REVIEW OF SYSTEMS  As above    HISTORIES  PMH:  Past Medical History:   Diagnosis Date    Basal cell carcinoma, ear, unspecified laterality     LAST ASSESSED: 27OCT2015    Hyperlipidemia     Hypertension     Inguinal hernia     RESOLVED: [id number]    Malignant neoplasm of prostate ([address])     RESOLVED: [id number]    Prostate enlargement     RESOLVED: [id number]    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, left     LAST ASSESSED: [id number]       PSH:  Past Surgical History:   Procedure Laterality Date    COLONOSCOPY      DENTAL SURGERY      HERNIA REPAIR      IR AORTAGRAM WITH RUN-OFF  09/20/2019    IR AORTAGRAM WITH RUN-OFF  11/16/2020    IR LOWER EXTREMITY ANGIOGRAM  10/13/2023    VA ENDOVEN ABLTJ INCMPTNT VEIN XTR LASER 1ST VEIN Right 07/14/2020    Procedure: ENDOVASCULAR LASER THERAPY (EVLT);   Surgeon: Patel Norris MD;  [facility];  Service: Vascular    VA RPR 1ST INGUN HRNA AGE 5 YRS/> REDUCIBLE Right 12/07/2016    Procedure: REPAIR HERNIA INGUINAL; HYDROCELECTOMY;  Surgeon: Blair Webber MD;  [facility];  Service: General    VA Lebron Minto 3RD+ ORD [address] PEL/LXTR [address] Right 11/16/2020    Procedure: ARTERIOGRAM, DIAGNOSTIC AORTO ILIAC, RIGHT LOWER EXTREMITY  ANGIOGRAM, ANGIOPLASTY AND STENTING OF SFA/POPLITEAL, ANGIOPLASTY OF ANTERIOTIBIAL/POPLITEAL AND ATHERECTOMY, ANGIOPLASTY OF TIBIOPERINEAL TRUNK, MYNX CLOSURE;  Surgeon: Patel Norris MD;  [facility];  Service: Vascular    VA Lebron Minto 3RD+ ORD [address] PEL/LXTR [address] N/A 10/13/2023    Procedure: right lower extremity arteriogram;  Surgeon: Randi Long MD;  [facility];  Service: Vascular    PROSTATE SURGERY  2014    PROSTATECTOMY RADICAL; LAST ASSESSED: 27OCT2015    SKIN CANCER EXCISION      TRANSURETHRAL RESECTION OF PROSTATE  2011    VASCULAR SURGERY         SocHx:  Social History     Tobacco Use    Smoking status: Never    Smokeless tobacco: Never   Vaping Use    Vaping Use: Never used   Substance Use Topics Alcohol use: Not Currently    Drug use: Never       FH:  Family History   Problem Relation Age of Onset    Hypertension Mother     Cancer Father     Kidney failure Family        ALLERGIES:  Allergies   Allergen Reactions    Other Itching and Rash     SESAME SEEDS,SOB       PHYSICAL EXAM  Visit Vitals  BP (!) 178/85   Pulse 82   Temp 97.8 °F (36.6 °C) (Tympanic)   Resp 21   SpO2 98%   Smoking Status Never       General: NAD, AOx4  Eyes:  bilateral periorbital ecchymosis   Ears:  External: significant edema with abrasion on dorsum, scant bleeding from R controlled as below. More significant bleeding L controlled as below   Oral cavity:  No trismus, no mass/lesions, upper lip ecchymosis and edema   Neck: Trachea is midline; no thyroid nodules, Salivary glands symmetrical, no masses/abnormality on palpation  Lymph:  No cervical lymphadenopathy  Skin: forehead and nasal abrasion   Neuro: Motor and sensory grossly intact. no obvious cranial nerve palsies,motor and sensory grossly intact, no focal deficits.    Lungs:  Normal work of breathing, symmetrical chest expansion  Vascular: Well perfused      LABORATORY  Reviewed    PROCEDURES  Pack placement  L merocel 3cm placed in nasal cavity and inflated with afrin with good control of bleeding  R surgifoam placed with good control of bleeding    RADIOLOGY  CTH reviewed in PACS    Patient Active Problem List    Diagnosis Date Noted    Fall 11/20/2023    Closed dislocation of metacarpophalangeal joint of left thumb 11/20/2023    Epistaxis due to trauma 11/20/2023    Laceration of left thumb 11/20/2023    Nasal bone fracture 11/20/2023    Atherosclerosis of native artery of both lower extremities with intermittent claudication ([address]) 08/24/2023    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 ([address]) 09/26/2022    Embolism and thrombosis of arteries of the lower extremities ([address]) 09/26/2022    Stage 3a chronic kidney disease ([address]) 06/20/2022    Carotid stenosis, asymptomatic 04/14/2020    Chronic venous insufficiency 09/16/2019    Inguinal hernia of right side with obstruction 12/03/2016    Prostate cancer ([address]) 06/06/2014    Benign essential hypertension 05/16/2012    Hypercholesterolemia 05/16/2012    Peripheral vascular disease, unspecified ([address]) 05/16/2012       Noah Galvin MD  Otolaryngology - Head and Neck Surgery PGY-3  Please contact ENT Resident [place] Text Role for any questions or concerns.

## 2023-11-20 NOTE — ASSESSMENT & PLAN NOTE
Attempted control with direct pressure, failed   Merocel placed  ENT consultation for epistaxis management   Nasal packing performed via ENT  To remain in place until 11/24

## 2023-11-20 NOTE — CONSULTS
Orthopedics   Kam Camejo 79 y.o. male MRN: [id number]  Unit/Bed#: ED 25      Chief Complaint:   Left hand radiographic abnormality    HPI:  79 y.o. male right hand dominant s/p mechanical fall with resulting nasal bone fracture and persistent epistaxis controlled per ENT. + Headstrike. Denies LOC. Takes ASA and plavix as blood thinners. Per trauma team, patient had a small laceration to dorsum of thumb metacarpal that was superficial in nature which they primarily closed. XR of the left hand was read as subluxation of thumb CMCJ, for which an orthopedic consultation was placed. Patient denies any pain or abnormal contouring to his left hand. States he has no history of rheumatologic condition or osteoarthritis of the hands. Denies numbness or tingling, no open wounds noted with the exception of the aforementioned laceration. No other complaints at this time. PMH significant for basal cell carcinoma, HLD, HTN, prostate cancer. Occupation retired. Review Of Systems:   Skin: See HPI  Neuro: See HPI  Musculoskeletal: See HPI  14 point review of systems negative except as stated above     Past Medical History:   Past Medical History:   Diagnosis Date    Basal cell carcinoma, ear, unspecified laterality     LAST ASSESSED: 27OCT2015    Hyperlipidemia     Hypertension     Inguinal hernia     RESOLVED: [id number]    Malignant neoplasm of prostate ([address])     RESOLVED: [id number]    Prostate enlargement     RESOLVED: [id number]    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, left     LAST ASSESSED: 27OCT2015       Past Surgical History:   Past Surgical History:   Procedure Laterality Date    COLONOSCOPY      DENTAL SURGERY      HERNIA REPAIR      IR AORTAGRAM WITH RUN-OFF  09/20/2019    IR AORTAGRAM WITH RUN-OFF  11/16/2020    IR LOWER EXTREMITY ANGIOGRAM  10/13/2023    [place] ENDOVEN ABLTJ INCMPTNT VEIN XTR LASER 1ST VEIN Right 07/14/2020    Procedure: ENDOVASCULAR LASER THERAPY (EVLT);   Surgeon: Migdalia Sorto MD;  [facility];  Service: Vascular    MN RPR 1ST INGUN HRNA AGE 5 YRS/> REDUCIBLE Right 12/07/2016    Procedure: REPAIR HERNIA INGUINAL; HYDROCELECTOMY;  Surgeon: Crystal Fuchs MD;  [facility];  Service: General    [place] Ortegakranthi Juandavid 3RD+ ORD [address] PEL/LXTR [address] Right 11/16/2020    Procedure: ARTERIOGRAM, DIAGNOSTIC AORTO ILIAC, RIGHT LOWER EXTREMITY  ANGIOGRAM, ANGIOPLASTY AND STENTING OF SFA/POPLITEAL, ANGIOPLASTY OF ANTERIOTIBIAL/POPLITEAL AND ATHERECTOMY, ANGIOPLASTY OF TIBIOPERINEAL TRUNK, MYNX CLOSURE;  Surgeon: Karis Johansen MD;  [facility];  Service: Vascular    [place] Elmo Martinezer 3RD+ ORD [id number] PEL/LXTR [address] N/A 10/13/2023    Procedure: right lower extremity arteriogram;  Surgeon: Rajinder Singletary MD;  [facility];  Service: Vascular    PROSTATE SURGERY  2014    PROSTATECTOMY RADICAL; LAST ASSESSED: 27OCT2015    SKIN CANCER EXCISION      TRANSURETHRAL RESECTION OF PROSTATE  2011    VASCULAR SURGERY         Family History:  Family history reviewed and non-contributory  Family History   Problem Relation Age of Onset    Hypertension Mother     Cancer Father     Kidney failure Family        Social History:  Social History     Socioeconomic History    Marital status: /Civil Union     Spouse name: None    Number of children: None    Years of education: None    Highest education level: None   Occupational History    Occupation: RETIRED      Employer: [employer]     Comment: FACULTY    Tobacco Use    Smoking status: Never    Smokeless tobacco: Never   Vaping Use    Vaping Use: Never used   Substance and Sexual Activity    Alcohol use: Not Currently    Drug use: Never    Sexual activity: None   Other Topics Concern    None   Social History Narrative    Always uses seat belt    No advance directives     Social Determinants of Health     Financial Resource Strain: Low Risk  (1/24/2023)    Overall Financial Resource Strain (CARDIA)     Difficulty of Paying Living Expenses: Not hard at all   Food Insecurity: Not on file   Transportation Needs: No Transportation Needs (1/24/2023)    PRAPARE - Transportation     Lack of Transportation (Medical): No     Lack of Transportation (Non-Medical): No   Physical Activity: Not on file   Stress: Not on file   Social Connections: Not on file   Intimate Partner Violence: Not on file   Housing Stability: Not on file       Allergies:    Allergies   Allergen Reactions    Other Itching and Rash     SESAME SEEDS,SOB           Labs:  0   Lab Value Date/Time    HCT 43 11/20/2023 1507    HCT 47.9 10/05/2023 0747    HCT 46.0 06/07/2022 0752    HCT 46.4 07/06/2021 0751    HCT 49.0 03/08/2016 0815    HCT 42.7 05/30/2014 1232    HCT 45.1 05/21/2014 0723    HGB 14.6 11/20/2023 1507    HGB 15.2 10/05/2023 0747    HGB 15.2 06/07/2022 0752    HGB 15.0 07/06/2021 0751    HGB 15.8 03/08/2016 0815    HGB 14.0 05/30/2014 1232    HGB 15.1 05/21/2014 0723    INR 0.99 10/05/2023 0747    WBC 10.78 (H) 10/05/2023 0747    WBC 11.35 (H) 06/07/2022 0752    WBC 11.03 (H) 07/06/2021 0751    WBC 11.5 (H) 03/08/2016 0815    WBC 17.17 (H) 05/20/2014 0703    WBC 18.71 (H) 05/19/2014 1804       Meds:    Current Facility-Administered Medications:     acetaminophen (TYLENOL) tablet 650 mg, 650 mg, Oral, Q6H PRN, Paulino Mullins MD    ondansetron (ZOFRAN) injection 4 mg, 4 mg, Intravenous, Q4H PRN, Paulino Mullins MD    oxyCODONE (ROXICODONE) immediate release tablet 10 mg, 10 mg, Oral, Q4H PRN, Paulino Mullins MD    oxyCODONE (ROXICODONE) IR tablet 5 mg, 5 mg, Oral, Q4H PRN, Paulino Mullins MD    Current Outpatient Medications:     clopidogrel (Plavix) 75 mg tablet, Take 1 tablet (75 mg total) by mouth daily, Disp: 90 tablet, Rfl: 3    amLODIPine (NORVASC) 2.5 mg tablet, Take 1 tablet (2.5 mg total) by mouth daily at bedtime (Patient taking differently: Take 1.25 mg by mouth daily at bedtime), Disp: 90 tablet, Rfl: 1    aspirin (ECOTRIN LOW STRENGTH) 81 mg EC tablet, opne po 2X a week, eg Monday and Friday. (Patient taking differently: 3 (three) times a week), Disp: , Rfl:     dorzolamide-timolol (COSOPT) 22.3-6.8 MG/ML ophthalmic solution, Administer to both eyes 2 (two) times a day, Disp: , Rfl:     gentamicin (GARAMYCIN) 0.1 % ointment, , Disp: , Rfl:     latanoprost (XALATAN) 0.005 % ophthalmic solution, Administer to both eyes daily at bedtime, Disp: , Rfl:     metoprolol succinate (Toprol XL) 25 mg 24 hr tablet, Take 1 tablet (25 mg total) by mouth daily, Disp: 90 tablet, Rfl: 1    multivitamin (THERAGRAN) TABS, Take 1 tablet by mouth daily, Disp: , Rfl:     simvastatin (ZOCOR) 20 mg tablet, TAKE 1 TABLET BY MOUTH EVERY DAY, Disp: 90 tablet, Rfl: 1    Blood Culture:   No results found for: "BLOODCX"    Wound Culture:   No results found for: "WOUNDCULT"    Ins and Outs:  No intake/output data recorded. Physical Exam:   /78   Pulse 80   Temp 97.8 °F (36.6 °C) (Tympanic)   Resp 16   SpO2 98%   Gen: No acute distress, resting comfortably in bed  HEENT: Eyes clear, moist mucus membranes, hearing intact  Respiratory: No audible wheezing or stridor  Cardiovascular: Well Perfused peripherally, 2+ distal pulse  Abdomen: nondistended, no peritoneal signs  Musculoskeletal: left upper extremity  Skin intact with exception of laceration previously repaired per trauma service  NTTP  Full active & passive ROM at thumb CMCJ, MCPJ, and IPJ   SILT median, radial, and ulnar distributions  Motor intact AIN, PIN, median, radial, and ulnar distributions  2+ radial and ulnar pulse, symmetric bilaterally  Digits warm and well perfused      Tertiary: no tenderness over all other joints/long bones as except already stated. Radiology:   I personally reviewed the films. XR left hand demonstrates no acute fracture or dislocation.  Significant degenerative changes throughout carpal joints, most notably at thumb CMCJ with large osteophyte causing radial subluxation of thumb MCP. Assessment:  83 y.o.male RHD with degenerative changes of left hand causing radial subluxation of thumb MCP. No indication for orthopedic intervention. Plan:   WBAT LUE  Rest of care per primary team  There is no height or weight on file to calculate BMI.    Dispo: ok for discharge from orthopedic perspective    Kristel Armas MD

## 2023-11-20 NOTE — PROCEDURES
POC FAST US    Date/Time: 11/20/2023 3:00 PM    Performed by: Kelly Fuentes DO  Authorized by: Kelly Fuentes DO    Patient location:  Trauma  Other Assisting Provider: No    Procedure details:     Exam Type:  Diagnostic    Indications: abdominal pain      Technique: FAST      Views obtained:  Heart - Pericardial sac, LUQ - Splenorenal space, RUQ - Colunga's Pouch and Suprapubic - Pouch of Linus    Image availability:  Images available in PACS and video obtained  FAST Findings:     RUQ (Hepatorenal) free fluid: absent      LUQ (Splenorenal) free fluid: absent      Suprapubic free fluid: absent      Cardiac wall motion: identified      Pericardial effusion: absent    Interpretation:     Impressions: negative    Universal Protocol:  Consent: Verbal consent obtained. Consent given by: patient  Time out: Immediately prior to procedure a "time out" was called to verify the correct patient, procedure, equipment, support staff and site/side marked as required. Patient understanding: patient states understanding of the procedure being performed  Patient identity confirmed: arm band and verbally with patient  Laceration repair    Date/Time: 11/20/2023 4:00 PM    Performed by: Kelly Fuentes DO  Authorized by: Kelly Fuentes DO  Body area: upper extremity  Location details: left hand  Laceration length: 4 cm  Foreign bodies: no foreign bodies  Tendon involvement: none  Nerve involvement: none  Vascular damage: no  Anesthesia: local infiltration    Anesthesia:  Local Anesthetic: lidocaine 1% with epinephrine  Anesthetic total: 4 mL    Sedation:  Patient sedated: no        Procedure Details:  Preparation: Patient was prepped and draped in the usual sterile fashion.   Irrigation solution: saline  Irrigation method: jet lavage  Amount of cleaning: standard  Debridement: none  Degree of undermining: none  Skin closure: 6-0 Prolene  Number of sutures: 4  Technique: simple  Approximation: close  Approximation difficulty: simple  Dressing: gauze roll and 4x4 sterile gauze  Patient tolerance: patient tolerated the procedure well with no immediate complications

## 2023-11-20 NOTE — H&P
[address] [place] [address]  H&P  Name: Huong Hardy 79 y.o. male I MRN: [id number]  Unit/Bed#: [facility] 802-01 I Date of Admission: 11/20/2023   Date of Service: 11/20/2023 I Hospital Day: 0      Assessment/Plan   Nasal bone fracture  Assessment & Plan  bilateral comminuted nasal bone fracture comminuted nasal septal fracture, fracture of medial wall of left lacrimal canal  ENT consultation, non-op management    Laceration of left thumb  Assessment & Plan  Laceration repair, see procedure note   6-0 prolene, 4 interrupted sutures, removal in 5-7 days    Epistaxis due to trauma  Assessment & Plan  Attempted control with direct pressure, failed   Merocel placed  ENT consultation for epistaxis management   Nasal packing performed via ENT  To remain in place until 11/24    Closed dislocation of metacarpophalangeal joint of left thumb  Assessment & Plan  Left thumb dislocation   Ortho consultation - likely chronic    * Fall  Assessment & Plan  Mechanical fall  No prodrome   Evaluate ambulatory status  PT/OT consults           Trauma Alert: Level B   Model of Arrival: Ambulance    Trauma Team: Attending Dr. Bola Goode and Residents Dr. Dana Gipson  Consultants:     Orthopedics: Left thumb dislocation - STAT consult; returned call/text yes  ;      ENT: routine consult; Epic consult order placed; History of Present Illness     Chief Complaint: "my nose is bleeding"  Mechanism:Fall     HPI:    Huong Hardy is a 79 y.o. male who presents To the [address] [place] [address] emergency department over concern of a nosebleed after a mechanical fall. He states that the fall happened approximately 15 minutes prior to arrival.  He tripped over his own feet. He endorses having head strike. No loss of consciousness. Able to ambulate after the fall. He landed on his left hand and sustained a laceration.   Initially the patient had a small abrasion over the left thigh, abrasion to the bridge of the left nose, bilateral bleeding from the naris, abrasion to left knee. He has a past medical history of hypertension, hyperlipidemia. 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 Review of Systems   Musculoskeletal:  Negative for neck pain and neck stiffness. Skin:  Positive for wound (abrasion above left eye, left knee, epistaxis). Neurological:  Negative for numbness and headaches. All other systems reviewed and are negative. 12-point, complete review of systems was reviewed and negative except as stated above. 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, left     LAST ASSESSED: 27OCT2015     Past Surgical History:   Procedure Laterality Date    COLONOSCOPY      DENTAL SURGERY      HERNIA REPAIR      IR AORTAGRAM WITH RUN-OFF  09/20/2019    IR AORTAGRAM WITH RUN-OFF  11/16/2020    IR LOWER EXTREMITY ANGIOGRAM  10/13/2023    [place] ENDOVEN ABLTJ INCMPTNT VEIN XTR LASER 1ST VEIN Right 07/14/2020    Procedure: ENDOVASCULAR LASER THERAPY (EVLT);   Surgeon: Ligia Sykes MD;  [facility];  Service: Vascular    CO RPR 1ST INGUN HRNA AGE 5 YRS/> REDUCIBLE Right 12/07/2016    Procedure: REPAIR HERNIA INGUINAL; HYDROCELECTOMY;  Surgeon: Vladislav Horne MD;  [facility];  Service: General    CO Minnie Overall 3RD+ ORD [address] PEL/LXTR [address] [facility] Right 11/16/2020    Procedure: ARTERIOGRAM, DIAGNOSTIC AORTO ILIAC, RIGHT LOWER EXTREMITY  ANGIOGRAM, ANGIOPLASTY AND STENTING OF SFA/POPLITEAL, ANGIOPLASTY OF ANTERIOTIBIAL/POPLITEAL AND ATHERECTOMY, ANGIOPLASTY OF TIBIOPERINEAL TRUNK, MYNX CLOSURE; Surgeon: Milton Chahal MD;  [facility];  Service: Vascular    MD [address] [facility] 3RD+ ORD [address] PEL/LXTR [facility] N/A 10/13/2023    Procedure: right lower extremity arteriogram;  Surgeon: Bala Mccauley MD;  [facility];  Service: Vascular    PROSTATE SURGERY  2014    PROSTATECTOMY RADICAL; LAST ASSESSED: 27OCT2015    SKIN CANCER EXCISION      TRANSURETHRAL RESECTION OF PROSTATE  2011    VASCULAR SURGERY          Social History     Tobacco Use    Smoking status: Never    Smokeless tobacco: Never   Vaping Use    Vaping Use: Never used   Substance Use Topics    Alcohol use: Not Currently    Drug use: Never     Immunization History   Administered Date(s) Administered    COVID-19 PFIZER VACCINE 0.3 ML IM 01/21/2021, 02/11/2021    H1N1, All Formulations 01/08/2010    INFLUENZA 09/23/2015, 10/05/2016, 10/09/2017, 10/15/2018    Influenza Split High Dose Preservative Free IM 09/23/2015, 10/05/2016    Influenza, seasonal, injectable 10/09/2017    Pneumococcal Conjugate 13-Valent 10/09/2017    Pneumococcal Polysaccharide PPV23 12/19/2018    Tdap 12/19/2018, 11/20/2023    influenza, trivalent, adjuvanted 10/08/2019     Last Tetanus: updated on 11/20/2023  Family History: Non-contributory    1. Before the illness or injury that brought you to the Emergency, did you need someone to help you on a regular basis? 0=No   2. Since the illness or injury that brought you to the Emergency, have you needed more help than usual to take care of yourself? 0=No   3. Have you been hospitalized for one or more nights during the past 6 months (excluding a stay in the Emergency Department)? 0=No   4. In general, do you see well? 0=Yes   5. In general, do you have serious problems with your memory? 0=No   6. Do you take more than three different medications everyday?  1=Yes   TOTAL   1     Did you order a geriatric consult if the score was 2 or greater?: n/a     Meds/Allergies   current meds:   Current Facility-Administered Medications   Medication Dose Route Frequency    acetaminophen (TYLENOL) tablet 650 mg  650 mg Oral Q6H PRN    ondansetron (ZOFRAN) injection 4 mg  4 mg Intravenous Q4H PRN    oxyCODONE (ROXICODONE) immediate release tablet 10 mg  10 mg Oral Q4H PRN    oxyCODONE (ROXICODONE) IR tablet 5 mg  5 mg Oral Q4H PRN        Allergies   Allergen Reactions    Other Itching and Rash     SESAME SEEDS,SOB       Objective   Initial Vitals:   Temperature: 97.8 °F (36.6 °C) (11/20/23 1500)  Pulse: 81 (11/20/23 1500)  Respirations: 16 (11/20/23 1500)  Blood Pressure: (!) 201/88 (11/20/23 1500)    Primary Survey:   Airway:        Status: patent;        Pre-hospital Interventions: none        Hospital Interventions: none  Breathing:        Pre-hospital Interventions: none       Effort: normal       Right breath sounds: normal       Left breath sounds: normal  Circulation:        Rhythm: regular no murmur       Rate: regular   Right Pulses Left Pulses    R radial: 2+  R femoral: 2+  R pedal: 2+     L radial: 2+  L femoral: 2+  L pedal: 2+       Disability:        GCS: Eye: 4; Verbal: 5 Motor: 6 Total: 15       Right Pupil: 3 mm;  round;  reactive         Left Pupil:  3 mm;  round;  reactive      R Motor Strength L Motor Strength    R : 5/5  R dorsiflex: 5/5  R plantarflex: 5/5 L : 5/5  L dorsiflex: 5/5  L plantarflex: 5/5        Sensory:  No sensory deficit  Exposure:       Completed: Yes      Secondary Survey:  Physical Exam  Constitutional:       General: He is in acute distress. Appearance: Normal appearance. He is not ill-appearing, toxic-appearing or diaphoretic. HENT:      Head: Normocephalic. No Ferrer's sign. Comments: Bilateral newton-orbital ecchymosis, abrasion of nasal bridge, b/l epistaxis, septum appears midline on initial exam, no septal hematoma.  Dried blood around the mouth     Right Ear: Tympanic membrane normal.      Left Ear: Tympanic membrane normal.      Mouth/Throat: Mouth: Mucous membranes are moist.   Eyes:      Extraocular Movements: Extraocular movements intact. Pupils: Pupils are equal, round, and reactive to light. Cardiovascular:      Rate and Rhythm: Normal rate and regular rhythm. Pulmonary:      Effort: Pulmonary effort is normal. No respiratory distress. Breath sounds: No wheezing, rhonchi or rales. Abdominal:      General: Abdomen is flat. There is no distension. Palpations: Abdomen is soft. Tenderness: There is no abdominal tenderness. Musculoskeletal:         General: Normal range of motion. Hands:       Cervical back: Normal range of motion and neck supple. No rigidity or tenderness. Comments: Left thumb laceration at MCP. No neuro deficits C5-T1 b/l. Skin:     Capillary Refill: Capillary refill takes less than 2 seconds. Neurological:      General: No focal deficit present. Mental Status: He is alert and oriented to person, place, and time. Mental status is at baseline. Cranial Nerves: No cranial nerve deficit. Sensory: No sensory deficit. Motor: No weakness. Coordination: Coordination normal.   Psychiatric:         Mood and Affect: Mood normal.         Behavior: Behavior normal.         Invasive Devices       Peripheral Intravenous Line  Duration             Peripheral IV 11/20/23 Right Antecubital <1 day                  Lab Results: I have personally reviewed all pertinent laboratory/test results from 11/20/23, including the preceding 24 hours. Recent Labs     11/20/23  1507   HGB 14.6   HCT 43   CO2 27   CAIONIZED 1.18       Imaging Results: I have personally reviewed pertinent images saved in PACS. CT scan findings (and other pertinent positive findings on images) were discussed with radiology.  My interpretation of the images/reports are as follows:  Chest Xray(s): negative for acute findings   FAST exam(s): negative for acute findings   CT Scan(s): positive for acute findings: Bilateral comminuted nasal bone fracture and comminuted nasal septal fracture,  Fracture of the medial wall of the left lacrimal canal.    Additional Xray(s): negative for acute findings, left thumb/hand xray     Other Studies: left thumb/hand xray    Code Status: Level 1 - Full Code  Advance Directive and Living Will:      Power of :    POLST:    I have spent 75 minutes with Patient and family today in which greater than 50% of this time was spent in counseling/coordination of care regarding Diagnostic results, Prognosis, Instructions for management, Patient and family education, Impressions, Counseling / Coordination of care, Documenting in the medical record, Reviewing / ordering tests, medicine, procedures  , Obtaining or reviewing history  , and Communicating with other healthcare professionals .

## 2023-11-20 NOTE — ASSESSMENT & PLAN NOTE
bilateral comminuted nasal bone fracture comminuted nasal septal fracture, fracture of medial wall of left lacrimal canal  ENT consultation, non-op management

## 2023-11-20 NOTE — TELEPHONE ENCOUNTER
Patient spouse called RE: patient fell today and is bleeding on his nose and lips. Upon reviewing chart patient is on clopidogrel and asa 81 mg verified by patient spouse. Given advised to go to ER for further evaluation .

## 2023-11-20 NOTE — DISCHARGE INSTR - AVS FIRST PAGE
Please come to ED on Friday between 7:30am-1pm to have packing removed. [facility]    Epistaxis precautions/Treatment after Nasal Cautery    If your nose has been cauterized, try to avoid lifting heavy objects and/or bending for a few days. Also, try to minimize nose blowing. When sneezing or coughing, keep your mouth open to prevent pressure buildup in the nose. Apply bacitracin ointment (or triple antibiotic ointment, Neosporin, or similar) to the nose two times per day for 1-2 weeks to help dissolve the cautery crusting (scab) and moisturize the nose. You can also use saine nasal spray ([place], [place], or any generic brand) 2 - 3 times per day to help with crusting or increased mucous. With cautery, your body produces increased mucous to help heal the crusting (scab). During an acute episode of epistaxis (nasal bleeding), apply Afrin (oxymetazoline if generic is used) nasal spray (available over the counter in most pharmacies) onto a cotton ball and insert it into the side that is bleeding. Then apply direct pressure by squeezing the soft part of your nose, where your nostrils are. Hold pressure without letting go for at least 10 minutes. If the bleeding continues remove the cotton ball and repeat this process 1 time.  If the bleeding continues call our office or go to the emergency room

## 2023-11-21 VITALS
BODY MASS INDEX: 26.96 KG/M2 | TEMPERATURE: 97.6 F | WEIGHT: 161.8 LBS | SYSTOLIC BLOOD PRESSURE: 141 MMHG | DIASTOLIC BLOOD PRESSURE: 75 MMHG | HEART RATE: 75 BPM | OXYGEN SATURATION: 100 % | HEIGHT: 65 IN | RESPIRATION RATE: 16 BRPM

## 2023-11-21 PROBLEM — H40.9 GLAUCOMA: Status: ACTIVE | Noted: 2023-11-21

## 2023-11-21 PROBLEM — I70.203 ATHEROSCLEROSIS OF ARTERY OF BOTH LOWER EXTREMITIES (HCC): Status: ACTIVE | Noted: 2023-11-21

## 2023-11-21 LAB
ANION GAP SERPL CALCULATED.3IONS-SCNC: 10 MMOL/L
BASOPHILS # BLD MANUAL: 0.14 THOUSAND/UL (ref 0–0.1)
BASOPHILS NFR MAR MANUAL: 1 % (ref 0–1)
BUN SERPL-MCNC: 49 MG/DL (ref 5–25)
CALCIUM SERPL-MCNC: 8.5 MG/DL (ref 8.4–10.2)
CHLORIDE SERPL-SCNC: 108 MMOL/L (ref 96–108)
CO2 SERPL-SCNC: 24 MMOL/L (ref 21–32)
CREAT SERPL-MCNC: 0.99 MG/DL (ref 0.6–1.3)
DME PARACHUTE DELIVERY DATE REQUESTED: NORMAL
DME PARACHUTE ITEM DESCRIPTION: NORMAL
DME PARACHUTE ORDER STATUS: NORMAL
DME PARACHUTE SUPPLIER NAME: NORMAL
DME PARACHUTE SUPPLIER PHONE: NORMAL
EOSINOPHIL # BLD MANUAL: 0 THOUSAND/UL (ref 0–0.4)
EOSINOPHIL NFR BLD MANUAL: 0 % (ref 0–6)
ERYTHROCYTE [DISTWIDTH] IN BLOOD BY AUTOMATED COUNT: 14.5 % (ref 11.6–15.1)
GFR SERPL CREATININE-BSD FRML MDRD: 69 ML/MIN/1.73SQ M
GLUCOSE SERPL-MCNC: 125 MG/DL (ref 65–140)
HCT VFR BLD AUTO: 35.8 % (ref 36.5–49.3)
HGB BLD-MCNC: 11.8 G/DL (ref 12–17)
LYMPHOCYTES # BLD AUTO: 2.7 THOUSAND/UL (ref 0.6–4.47)
LYMPHOCYTES # BLD AUTO: 20 % (ref 14–44)
MCH RBC QN AUTO: 30.7 PG (ref 26.8–34.3)
MCHC RBC AUTO-ENTMCNC: 33 G/DL (ref 31.4–37.4)
MCV RBC AUTO: 93 FL (ref 82–98)
MONOCYTES # BLD AUTO: 1.22 THOUSAND/UL (ref 0–1.22)
MONOCYTES NFR BLD: 9 % (ref 4–12)
NEUTROPHILS # BLD MANUAL: 9.45 THOUSAND/UL (ref 1.85–7.62)
NEUTS SEG NFR BLD AUTO: 70 % (ref 43–75)
PLATELET # BLD AUTO: 171 THOUSANDS/UL (ref 149–390)
PLATELET BLD QL SMEAR: ADEQUATE
PMV BLD AUTO: 11.2 FL (ref 8.9–12.7)
POTASSIUM SERPL-SCNC: 3.9 MMOL/L (ref 3.5–5.3)
RBC # BLD AUTO: 3.84 MILLION/UL (ref 3.88–5.62)
SODIUM SERPL-SCNC: 142 MMOL/L (ref 135–147)
WBC # BLD AUTO: 13.5 THOUSAND/UL (ref 4.31–10.16)

## 2023-11-21 PROCEDURE — 99239 HOSP IP/OBS DSCHRG MGMT >30: CPT | Performed by: SURGERY

## 2023-11-21 PROCEDURE — NC001 PR NO CHARGE: Performed by: SURGERY

## 2023-11-21 PROCEDURE — 99222 1ST HOSP IP/OBS MODERATE 55: CPT | Performed by: ORTHOPAEDIC SURGERY

## 2023-11-21 PROCEDURE — 85007 BL SMEAR W/DIFF WBC COUNT: CPT

## 2023-11-21 PROCEDURE — 97166 OT EVAL MOD COMPLEX 45 MIN: CPT

## 2023-11-21 PROCEDURE — 85027 COMPLETE CBC AUTOMATED: CPT

## 2023-11-21 PROCEDURE — 80048 BASIC METABOLIC PNL TOTAL CA: CPT

## 2023-11-21 PROCEDURE — NC001 PR NO CHARGE: Performed by: ORTHOPAEDIC SURGERY

## 2023-11-21 PROCEDURE — 97163 PT EVAL HIGH COMPLEX 45 MIN: CPT

## 2023-11-21 RX ORDER — AMOXICILLIN AND CLAVULANATE POTASSIUM 875; 125 MG/1; MG/1
1 TABLET, FILM COATED ORAL EVERY 12 HOURS SCHEDULED
Qty: 10 TABLET | Refills: 0 | Status: SHIPPED | OUTPATIENT
Start: 2023-11-21 | End: 2023-11-26

## 2023-11-21 RX ORDER — AMOXICILLIN AND CLAVULANATE POTASSIUM 875; 125 MG/1; MG/1
1 TABLET, FILM COATED ORAL ONCE
Status: COMPLETED | OUTPATIENT
Start: 2023-11-21 | End: 2023-11-21

## 2023-11-21 RX ADMIN — DORZOLAMIDE HYDROCHLORIDE AND TIMOLOL MALEATE 1 DROP: 22.3; 6.8 SOLUTION/ DROPS OPHTHALMIC at 09:25

## 2023-11-21 RX ADMIN — AMOXICILLIN AND CLAVULANATE POTASSIUM 1 TABLET: 875; 125 TABLET, FILM COATED ORAL at 16:23

## 2023-11-21 RX ADMIN — B-COMPLEX W/ C & FOLIC ACID TAB 1 TABLET: TAB at 09:24

## 2023-11-21 NOTE — PROGRESS NOTES
[facility]  Progress Note  Name: Huong Hardy I  MRN: [id number]  [facility] Date of Admission: 11/20/2023   Date of Service: 11/21/2023 I Hospital Day: 1    Assessment/Plan   Glaucoma  Assessment & Plan  Continue home medications   Latanoprost, dorzolamide    Nasal bone fracture  Assessment & Plan  bilateral comminuted nasal bone fracture comminuted nasal septal fracture, fracture of medial wall of left lacrimal canal  ENT consultation, non-op management  Nasal precautions    Laceration of left thumb  Assessment & Plan  Laceration repair, see procedure note   6-0 prolene, 4 interrupted sutures, removal in 5-7 days  Placed on 11/20    Epistaxis due to trauma  Assessment & Plan  Attempted control with direct pressure, failed   Merocel placed  ENT consultation for epistaxis management   Nasal packing performed via ENT  To remain in place until 11/24  Anti staph ppx   Nasal precautions    Closed dislocation of metacarpophalangeal joint of left thumb  Assessment & Plan  Left thumb dislocation on xray  Ortho consultation - likely chronic  WBAT LUE    * Fall  Assessment & Plan  Mechanical fall  No prodrome   Evaluate ambulatory status  PT/OT consults             Bowel Regimen: none  VTE Prophylaxis:Sequential compression device (Venodyne)      Disposition: DC home on 11/21    Subjective   Chief Complaint: "I feel much better though my nose is slowly oozing"    Subjective:     Huong Hardy is a 79 y.o. male who presents To the [facility] emergency department over concern of a nosebleed after a mechanical fall. He states that the fall happened approximately 15 minutes prior to arrival.  He tripped over his own feet. He endorses having head strike. No loss of consciousness. Able to ambulate after the fall. He landed on his left hand and sustained a laceration.   Initially the patient had a small abrasion over the left thigh, abrasion to the bridge of the left nose, bilateral bleeding from the naris, abrasion to left knee. He has a past medical history of hypertension, hyperlipidemia. 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 Objective   Vitals:   Temp:  [97.8 °F (36.6 °C)-98.3 °F (36.8 °C)] 98.3 °F (36.8 °C)  HR:  [77-95] 83  Resp:  [15-21] 16  BP: (138-201)/(72-90) 138/76    I/O       None             Physical Exam:   GENERAL APPEARANCE: Patient in no acute distress. HEENT: NC; PERRL, EOMs intact; Mucous membranes moist, merocel in L nares, bleeding controlled, abrasion on left superior orbit  NECK / BACK: supple, full rom  CV: Regular rate and rhythm; no murmur/gallops/rubs appreciated. CHEST / LUNGS: Clear to auscultation; no wheezes/rales/rhonci. ABD: NABS; soft; non-distended; non-tender. : deferred  EXT: +2 pulses bilaterally upper & lower extremities; no edema. Laceration repair of left thumb, abrasion left knee  NEURO: GCS 15; no focal neurologic deficits; neurovascularly intact. SKIN: Warm, dry and well perfused; no rash; no jaundice. Invasive Devices       Peripheral Intravenous Line  Duration             Peripheral IV 11/20/23 Right Antecubital <1 day                          Lab Results: BMP/CMP:   Lab Results   Component Value Date    SODIUM 142 11/21/2023    K 3.9 11/21/2023     11/21/2023    CO2 24 11/21/2023    CO2 27 11/20/2023    BUN 49 (H) 11/21/2023    CREATININE 0.99 11/21/2023    GLUCOSE 152 (H) 11/20/2023    CALCIUM 8.5 11/21/2023    EGFR 69 11/21/2023    and CBC:   Lab Results   Component Value Date    WBC 13.50 (H) 11/21/2023    HGB 11.8 (L) 11/21/2023    HCT 35.8 (L) 11/21/2023    MCV 93 11/21/2023     11/21/2023    RBC 3.84 (L) 11/21/2023    MCH 30.7 11/21/2023    MCHC 33.0 11/21/2023    RDW 14.5 11/21/2023    MPV 11.2 11/21/2023     Imaging: I have personally reviewed pertinent reports.    and I have personally reviewed pertinent films in PACS   Other Studies: n/a

## 2023-11-21 NOTE — OCCUPATIONAL THERAPY NOTE
Occupational Therapy Evaluation     Patient Name: Jalen Martinez  LVRFM'L Date: 11/21/2023  Problem List  Principal Problem:    Fall  Active Problems:    Closed dislocation of metacarpophalangeal joint of left thumb    Epistaxis due to trauma    Laceration of left thumb    Nasal bone fracture    Atherosclerosis of artery of both lower extremities (HCC)    Glaucoma    Past Medical History  Past Medical History:   Diagnosis Date    Basal cell carcinoma, ear, unspecified laterality     LAST ASSESSED: 27OCT2015    Hyperlipidemia     Hypertension     Inguinal hernia     RESOLVED: [id number]    Malignant neoplasm of prostate ([address])     RESOLVED: [id number]    Prostate enlargement     RESOLVED: [id number]    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, left     LAST ASSESSED: 27OCT2015     Past Surgical History  Past Surgical History:   Procedure Laterality Date    COLONOSCOPY      DENTAL SURGERY      HERNIA REPAIR      IR AORTAGRAM WITH RUN-OFF  09/20/2019    IR AORTAGRAM WITH RUN-OFF  11/16/2020    IR LOWER EXTREMITY ANGIOGRAM  10/13/2023    NC ENDOVEN ABLTJ INCMPTNT VEIN XTR LASER 1ST VEIN Right 07/14/2020    Procedure: ENDOVASCULAR LASER THERAPY (EVLT);   Surgeon: Cindy Perry MD;  [facility];  Service: Vascular    NC RPR 1ST INGUN HRNA AGE 5 YRS/> REDUCIBLE Right 12/07/2016    Procedure: REPAIR HERNIA INGUINAL; HYDROCELECTOMY;  Surgeon: Katrin Vaca MD;  [facility];  Service: General    NC Valentina Francois 3RD+ ORD [id number] PEL/LXTR [address] Right 11/16/2020    Procedure: ARTERIOGRAM, DIAGNOSTIC AORTO ILIAC, RIGHT LOWER EXTREMITY  ANGIOGRAM, ANGIOPLASTY AND STENTING OF SFA/POPLITEAL, ANGIOPLASTY OF ANTERIOTIBIAL/POPLITEAL AND ATHERECTOMY, ANGIOPLASTY OF TIBIOPERINEAL TRUNK, MYNX CLOSURE;  Surgeon: Cindy Perry MD;  [facility];  Service: Vascular    NC Valentina Francois 3RD+ ORD [address] PEL/LXTR [address] N/A 10/13/2023    Procedure: right lower extremity arteriogram;  Surgeon: Sylvia Chacon Inocencia Santoyo MD;  [facility];  Service: Vascular    PROSTATE SURGERY  2014    PROSTATECTOMY RADICAL; LAST ASSESSED: 27OCT2015    SKIN CANCER EXCISION      TRANSURETHRAL RESECTION OF PROSTATE  2011    VASCULAR SURGERY             11/21/23 1006   OT Last Visit   OT Visit Date 11/21/23   Note Type   Note type Evaluation   Pain Assessment   Pain Assessment Tool 0-10   Pain Score No Pain   Restrictions/Precautions   Weight Bearing Precautions Per Order Yes   LUE Weight Bearing Per Order WBAT   Other Precautions Fall Risk;Pain;Hard of hearing  (B/L Hearing aids)   Home Living   Type of [address] [place] [address] Two level;1/2 bath on main level;Bed/bath upstairs   Bathroom Shower/Tub Tub/shower unit   Bathroom Toilet Standard   Bathroom Equipment   (denies)   Home Equipment   (denies)   Additional Comments Pt resides with spouse in a [address] [facility] with 1 MARIB[address]. Prior Function   Level of [place] Independent with ADLs; Independent with functional mobility; Independent with IADLS   Lives With Spouse   Receives Help From Family   IADLs Independent with driving; Independent with meal prep; Independent with medication management   Falls in the last 6 months 1 to 4  (x1 leading to admission)   Vocational Retired   Comments PTA, Pt reports being I with ADLs/IADLs/no AD/ + (short distances). Pt with supportive spouse able to assist upon DC. Lifestyle   Autonomy I with ADLs/IADLs/no AD/ +   Reciprocal Relationships Supportive spouse   Service to Others Retired-Life sciences professor at [employer] Enjoys walking and organizing his home   ADL   Where Assessed Edge of bed   Eating Assistance [address] 5  [address] [place] [address]  Supervision/Setup   LB Dressing Deficit Thread RLE into underwear; Thread LLE into underwear Toileting Assistance  5  Supervision/Setup   Functional Assistance 4  Minimal Assistance   Additional Comments Pt/spouse educated on compensatory techniques with ADLs upon DC. Pt also educated on use of SC upon DC and where to purchase. Bed Mobility   Supine to Sit 5  Supervision   Additional Comments Pt greeted supine in bed. Transfers   Sit to Stand 5  Supervision   Additional items Increased time required   Stand to Sit 5  Supervision   Additional items Increased time required   Additional Comments no AD   Functional Mobility   Functional Mobility 4  Minimal assistance   Additional Comments Pt completes functional mobility with min AX1-CS with RW and requires min AX1 with HHA (trialed) for household distances. Pt agreeable to utilize RW. Additional items Rolling walker   Balance   Static Sitting Fair +   Dynamic Sitting Fair   Static Standing Fair   Dynamic Standing Fair -   Ambulatory Poor +   Activity Tolerance   Activity Tolerance Patient tolerated treatment well   Medical Staff Made Aware CO-eval with SKIP [place] 2* to Pt's medical complexity and decreased endurance. Nurse Made Aware RN cleared/updated. RUE Assessment   RUE Assessment WFL   LUE Assessment   LUE Assessment WFL   Hand Function   Gross Motor Coordination Functional   Fine Motor Coordination Functional   Sensation   Light Touch No apparent deficits   Vision-Basic Assessment   Visual History Glaucoma   Patient Visual Report   (Pt denies any changes)   Psychosocial   Psychosocial (WDL) WDL   Cognition   Overall Cognitive Status WFL   Arousal/Participation Cooperative; Alert   Attention Within functional limits   Orientation Level Oriented X4   Memory Decreased recall of precautions   Following Commands Follows one step commands without difficulty   Comments Pt pleasant and cooperative during OT session. Assessment   Limitation Decreased high-level ADLs; Decreased endurance   Prognosis Fair   Assessment Pt is a 79 y.o. male who presented to [facility] on 11/20/2023 with nosebleed s/p mechanical fall. Pt with diagnosis of nasal bone fx, laceration of L thumb, epistaxis due to trauma, and closed dislocation of metacarpophalangeal joint of L thumb. Pt WBAT on LUE per ortho. 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 Pt greeted bedside for OT evaluation on 11/21/2023. Pt resides with spouse in a [address] [place] [address],[address] with [address]. PTA, Pt reports being I with ADLs/IADLs/no AD/ + (short distances). Pt with supportive spouse able to assist upon DC. Pt demonstrating the following occupational deficits: S with UB ADLs, S with LB ADLS, S with functional transfers, S with bed mobility, and S-min Ax1 with functional mobility with RW. Pt encouraged to participate in ADLs/functional mobility with nursing/restorative staff during hospitalization. Pt with no further acute OT concerns. Pt would benefit from returning home with increased social support upon DC to maximize safety and independence with ADLs and functional tasks of choice. DC skilled OT services. Goals   Patient Goals To go home. Plan   OT Frequency Eval only   Discharge Recommendation   Rehab Resource Intensity Level, OT No post-acute rehabilitation needs   Equipment Recommended Shower/Tub chair with back ($)   Additional Comments  The patient's raw score on the AM-PAC Daily Activity Inpatient Short Form is 21. A raw score of greater than or equal to 19 suggests the patient may benefit from discharge to home. Please refer to the recommendation of the Occupational Therapist for safe discharge planning.    AM-PAC Daily Activity Inpatient   Lower Body Dressing 3   Bathing 3   Toileting 3   Upper Body Dressing 4   Grooming 4   Eating 4   Daily Activity Raw Score 21   Daily Activity Standardized Score (Calc for Raw Score >=11) 44.27   AM-PAC Applied Cognition Inpatient   Following a Speech/Presentation 4   Understanding Ordinary Conversation 4   Taking Medications 4   Remembering Where Things Are Placed or Put Away 4   Remembering List of 4-5 Errands 4   Taking Care of Complicated Tasks 4   Applied Cognition Raw Score 24   Applied Cognition Standardized Score 62.21   End of Consult   Education Provided Yes;Family or social support of family present for education by provider   Patient Position at End of Consult Bedside chair; All needs within reach   Nurse Communication Nurse aware of consult       Mary Mcgee MS, OTR/L

## 2023-11-21 NOTE — PLAN OF CARE
Problem: PAIN - ADULT  Goal: Verbalizes/displays adequate comfort level or baseline comfort level  Description: Interventions:  - Encourage patient to monitor pain and request assistance  - Assess pain using appropriate pain scale  - Administer analgesics based on type and severity of pain and evaluate response  - Implement non-pharmacological measures as appropriate and evaluate response  - Consider cultural and social influences on pain and pain management  - Notify physician/advanced practitioner if interventions unsuccessful or patient reports new pain  Outcome: Progressing     Problem: INFECTION - ADULT  Goal: Absence or prevention of progression during hospitalization  Description: INTERVENTIONS:  - Assess and monitor for signs and symptoms of infection  - Monitor lab/diagnostic results  - Monitor all insertion sites, i.e. indwelling lines, tubes, and drains  - Monitor endotracheal if appropriate and nasal secretions for changes in amount and color  - [place] appropriate cooling/warming therapies per order  - Administer medications as ordered  - Instruct and encourage patient and family to use good hand hygiene technique  - Identify and instruct in appropriate isolation precautions for identified infection/condition  Outcome: Progressing  Goal: Absence of fever/infection during neutropenic period  Description: INTERVENTIONS:  - Monitor WBC    Outcome: Progressing     Problem: SAFETY ADULT  Goal: Patient will remain free of falls  Description: INTERVENTIONS:  - Educate patient/family on patient safety including physical limitations  - Instruct patient to call for assistance with activity   - Consult OT/PT to assist with strengthening/mobility   - Keep Call bell within reach  - Keep bed low and locked with side rails adjusted as appropriate  - Keep care items and personal belongings within reach  - Initiate and maintain comfort rounds  - Make Fall Risk Sign visible to staff  - Offer Toileting every 2 Hours, in advance of need  - Apply yellow socks and bracelet for high fall risk patients  - Consider moving patient to room near nurses station  Outcome: Progressing  Goal: Maintain or return to baseline ADL function  Description: INTERVENTIONS:  -  Assess patient's ability to carry out ADLs; assess patient's baseline for ADL function and identify physical deficits which impact ability to perform ADLs (bathing, care of mouth/teeth, toileting, grooming, dressing, etc.)  - Assess/evaluate cause of self-care deficits   - Assess range of motion  - Assess patient's mobility; develop plan if impaired  - Assess patient's need for assistive devices and provide as appropriate  - Encourage maximum independence but intervene and supervise when necessary  - Involve family in performance of ADLs  - Assess for home care needs following discharge   - Consider OT consult to assist with ADL evaluation and planning for discharge  - Provide patient education as appropriate  Outcome: Progressing  Goal: Maintains/Returns to pre admission functional level  Description: INTERVENTIONS:  - Perform AM-PAC 6 Click Basic Mobility/ Daily Activity assessment daily.  - Set and communicate daily mobility goal to care team and patient/family/caregiver. - Collaborate with rehabilitation services on mobility goals if consulted  - Perform Range of Motion 3 times a day. - Reposition patient every 2 hours.   - Dangle patient 3 times a day  - Stand patient 3 times a day  - Ambulate patient 3 times a day  - Out of bed to chair 3 times a day   - Out of bed for meals 3 times a day  - Out of bed for toileting  - Record patient progress and toleration of activity level   Outcome: Progressing     Problem: DISCHARGE PLANNING  Goal: Discharge to home or other facility with appropriate resources  Description: INTERVENTIONS:  - Identify barriers to discharge w/patient and caregiver  - Arrange for needed discharge resources and transportation as appropriate  - Identify discharge learning needs (meds, wound care, etc.)  - Arrange for interpretive services to assist at discharge as needed  - Refer to Case Management Department for coordinating discharge planning if the patient needs post-hospital services based on physician/advanced practitioner order or complex needs related to functional status, cognitive ability, or social support system  Outcome: Progressing     Problem: Knowledge Deficit  Goal: Patient/family/caregiver demonstrates understanding of disease process, treatment plan, medications, and discharge instructions  Description: Complete learning assessment and assess knowledge base.   Interventions:  - Provide teaching at level of understanding  - Provide teaching via preferred learning methods  Outcome: Progressing     Problem: PAIN - ADULT  Goal: Verbalizes/displays adequate comfort level or baseline comfort level  Description: Interventions:  - Encourage patient to monitor pain and request assistance  - Assess pain using appropriate pain scale  - Administer analgesics based on type and severity of pain and evaluate response  - Implement non-pharmacological measures as appropriate and evaluate response  - Consider cultural and social influences on pain and pain management  - Notify physician/advanced practitioner if interventions unsuccessful or patient reports new pain  Outcome: Progressing     Problem: INFECTION - ADULT  Goal: Absence or prevention of progression during hospitalization  Description: INTERVENTIONS:  - Assess and monitor for signs and symptoms of infection  - Monitor lab/diagnostic results  - Monitor all insertion sites, i.e. indwelling lines, tubes, and drains  - Monitor endotracheal if appropriate and nasal secretions for changes in amount and color  - [place] appropriate cooling/warming therapies per order  - Administer medications as ordered  - Instruct and encourage patient and family to use good hand hygiene technique  - Identify and instruct in appropriate isolation precautions for identified infection/condition  Outcome: Progressing  Goal: Absence of fever/infection during neutropenic period  Description: INTERVENTIONS:  - Monitor WBC    Outcome: Progressing     Problem: SAFETY ADULT  Goal: Patient will remain free of falls  Description: INTERVENTIONS:  - Educate patient/family on patient safety including physical limitations  - Instruct patient to call for assistance with activity   - Consult OT/PT to assist with strengthening/mobility   - Keep Call bell within reach  - Keep bed low and locked with side rails adjusted as appropriate  - Keep care items and personal belongings within reach  - Initiate and maintain comfort rounds  - Make Fall Risk Sign visible to staff  - Apply yellow socks and bracelet for high fall risk patients  - Consider moving patient to room near nurses station  Outcome: Progressing  Goal: Maintain or return to baseline ADL function  Description: INTERVENTIONS:  -  Assess patient's ability to carry out ADLs; assess patient's baseline for ADL function and identify physical deficits which impact ability to perform ADLs (bathing, care of mouth/teeth, toileting, grooming, dressing, etc.)  - Assess/evaluate cause of self-care deficits   - Assess range of motion  - Assess patient's mobility; develop plan if impaired  - Assess patient's need for assistive devices and provide as appropriate  - Encourage maximum independence but intervene and supervise when necessary  - Involve family in performance of ADLs  - Assess for home care needs following discharge   - Consider OT consult to assist with ADL evaluation and planning for discharge  - Provide patient education as appropriate  Outcome: Progressing  Goal: Maintains/Returns to pre admission functional level  Description: INTERVENTIONS:  - Perform AM-PAC 6 Click Basic Mobility/ Daily Activity assessment daily.  - Set and communicate daily mobility goal to care team and patient/family/caregiver. - Collaborate with rehabilitation services on mobility goals if consulted  - Perform Range of Motion 3 times a day. - Reposition patient every 2 hours. - Dangle patient 2 times a day  - Stand patient 3 times a day  - Ambulate patient 3 times a day  - Out of bed to chair 3 times a day   - Out of bed for meals 3 times a day  - Out of bed for toileting  - Record patient progress and toleration of activity level   Outcome: Progressing     Problem: DISCHARGE PLANNING  Goal: Discharge to home or other facility with appropriate resources  Description: INTERVENTIONS:  - Identify barriers to discharge w/patient and caregiver  - Arrange for needed discharge resources and transportation as appropriate  - Identify discharge learning needs (meds, wound care, etc.)  - Arrange for interpretive services to assist at discharge as needed  - Refer to Case Management Department for coordinating discharge planning if the patient needs post-hospital services based on physician/advanced practitioner order or complex needs related to functional status, cognitive ability, or social support system  Outcome: Progressing     Problem: Knowledge Deficit  Goal: Patient/family/caregiver demonstrates understanding of disease process, treatment plan, medications, and discharge instructions  Description: Complete learning assessment and assess knowledge base.   Interventions:  - Provide teaching at level of understanding  - Provide teaching via preferred learning methods  Outcome: Progressing

## 2023-11-21 NOTE — DISCHARGE SUMMARY
Discharge Summary - Trauma Service   Jair Homans 79 y.o. male MRN: [id number]  Unit/Bed#: [facility] 802-01 Encounter: [id number]    Admission Date: 11/20/2023     Discharge Date: 11/21/2023    Admitting Diagnosis: Pain [R52]  Bleeding from the nose [R04.0]  Laceration of left thumb without foreign body without damage to nail, initial encounter [S61.012A]  Laceration of left thumb, foreign body presence unspecified, nail damage status unspecified, initial encounter [S61.012A]  Epistaxis due to trauma [R04.0]  Closed traumatic dislocation of metacarpophalangeal (MCP) joint of left thumb [S63.115A]    Discharge Diagnosis: epistaxis, b/l comminuted nasal bone fractures, laceration to left thumb, fall    Attending and Service: Dr. Amadou Azar. Consulting Physician(s): ENT, orthopedics    Imaging and Procedures Performed:   Orders Placed This Encounter   Procedures    Fast Ultrasound    Laceration repair       XR chest 1 view    Result Date: 11/21/2023  Impression: No acute cardiopulmonary disease within limitations of supine imaging. Workstation performed: [id number]     XR hand 3+ vw left    Result Date: 11/20/2023  Impression: No acute displaced fracture. There is however apparent dorsal subluxation at the thumb MCP joint. 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 Soft tissue swelling along the thumb. Workstation performed: [id number]     TRAUMA - CT spine cervical wo contrast    Result Date: 11/20/2023  Impression: No cervical spine fracture. Degenerative changes, with likely secondary multilevel grade 1 anterolisthesis. The results of this study, CT brain and CT facial bones were reported to ARSEN DIAZ on trauma cell phone on 11/20/2023 4:05 PM. Workstation performed: [id number]     XR trauma multiple    Result Date: 11/20/2023  Impression: No acute cardiopulmonary disease within limitations of supine imaging.  Workstation performed: [id number] TRAUMA - CT head wo contrast    Result Date: 11/20/2023  Impression: No acute intracranial abnormality. No acute intracranial pathology. Bilateral comminuted nasal bone fracture and comminuted nasal septal fracture. Fracture of the medial wall of the left lacrimal canal. Intact intraorbital contents. Status post bilateral cataract surgery. Workstation performed: [id number]     TRAUMA - CT facial bones wo contrast    Result Date: 11/20/2023  Impression: No acute intracranial abnormality. No acute intracranial pathology. Bilateral comminuted nasal bone fracture and comminuted nasal septal fracture. Fracture of the medial wall of the left lacrimal canal. Intact intraorbital contents. Status post bilateral cataract surgery. Workstation performed: [id number]       Hospital Course: Jalen Martinez is a 23-year-old male to [address] [place] [address] as a level B trauma alert after a mechanical fall with a strike on Plavix. During his work-up that showed bilateral comminuted nasal bone fracture with epistaxis. Bleeding was unable to be controlled with direct pressure. Attempted to control bleeding with Merisel and nebulized lidocaine with epi. ENT was consulted to manage epistaxis. Patient currently has Merisel placed in left nare. Bleeding has been controlled at this time. Additionally patient had a laceration at the base of his left MCP. There is repaired with 4 stitches, single interrupted. Those are to remain in place for the next 5 to 7 days. Orthopedics was consulted over concern of possible acute on chronic left thumb dislocation. Injury appears to be chronic. Patient was observed overnight due to nasal packing pain control.   She will be discharged with prescription for amoxicillin, counseled on nasal sinus precautions instructed to follow-up with trauma clinic and ENT    On discharge, the patient is instructed to follow-up with the patient's primary care provider to review the events of the patient's recent hospitalization. The patient should follow the provided discharge instructions. Condition at Discharge: stable     Discharge instructions/Information to patient and family:   See after visit summary for information provided to patient and family. Provisions for Follow-Up Care:  See after visit summary for information related to follow-up care and any pertinent home health orders. Disposition: Home    Planned Readmission: No    Discharge Statement   I spent 34 minutes discharging the patient. This time was spent on the day of discharge. I had direct contact with the patient on the day of discharge. Additional documentation is required if more than 30 minutes were spent on discharge. Discharge Medications:  See after visit summary for reconciled discharge medications provided to patient and family.       Jacobo Kaba DO  11/21/2023  3:59 PM

## 2023-11-21 NOTE — PLAN OF CARE
Problem: PAIN - ADULT  Goal: Verbalizes/displays adequate comfort level or baseline comfort level  Description: Interventions:  - Encourage patient to monitor pain and request assistance  - Assess pain using appropriate pain scale  - Administer analgesics based on type and severity of pain and evaluate response  - Implement non-pharmacological measures as appropriate and evaluate response  - Consider cultural and social influences on pain and pain management  - Notify physician/advanced practitioner if interventions unsuccessful or patient reports new pain  Outcome: Progressing     Problem: INFECTION - ADULT  Goal: Absence or prevention of progression during hospitalization  Description: INTERVENTIONS:  - Assess and monitor for signs and symptoms of infection  - Monitor lab/diagnostic results  - Monitor all insertion sites, i.e. indwelling lines, tubes, and drains  - Monitor endotracheal if appropriate and nasal secretions for changes in amount and color  - [place] appropriate cooling/warming therapies per order  - Administer medications as ordered  - Instruct and encourage patient and family to use good hand hygiene technique  - Identify and instruct in appropriate isolation precautions for identified infection/condition  Outcome: Progressing

## 2023-11-21 NOTE — CASE MANAGEMENT
Case Management Assessment & Discharge Planning Note    Patient name Ronan Hernandez  Location [address] 802/[facility] 187-18 MRN [id number]  : 1940 Date 2023       Current Admission Date: 2023  Current Admission Diagnosis:Fall   Patient Active Problem List    Diagnosis Date Noted    Atherosclerosis of artery of both lower extremities ([address]) 2023    Glaucoma 2023    Fall 2023    Closed dislocation of metacarpophalangeal joint of left thumb 2023    Epistaxis due to trauma 2023    Laceration of left thumb 2023    Nasal bone fracture 2023    Atherosclerosis of native artery of both lower extremities with intermittent claudication ([address]) 2023    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 ([address]) 2022    Embolism and thrombosis of arteries of the lower extremities ([address]) 2022    Stage 3a chronic kidney disease ([address]) 2022    Carotid stenosis, asymptomatic 2020    Chronic venous insufficiency 2019    Inguinal hernia of right side with obstruction 2016    Prostate cancer ([address]) 2014    Benign essential hypertension 2012    Hypercholesterolemia 2012    Peripheral vascular disease, unspecified ([address]) 2012      LOS (days): 1  Geometric Mean LOS (GMLOS) (days): 2.10  Days to GMLOS:1.4     OBJECTIVE:    Risk of Unplanned Readmission Score: 11.11         Current admission status: Inpatient       Preferred Pharmacy:   [facility]/pharmacy #[id number] Brooks  [address] [place] [address]  Phone: [phone] Fax: [phone]    Primary Care Provider: Mary Brooke DO    Primary Insurance: MEDICARE  Secondary Insurance: BLUE CROSS    ASSESSMENT:  [place] [address] Representative - Spouse   Primary Phone: [phone] [facility]  Home Phone: [phone]                 Advance Directives  Does patient have a Health Care POA?: No  Was patient offered paperwork?: Yes  Does patient currently have a Health Care decision maker?: Yes, please see Health Care Proxy section  Does patient have Advance Directives?: No  Was patient offered paperwork?: Yes  Primary Contact: Gianni Man (Spouse) [phone] (H) [phone]    Readmission Root Cause  30 Day Readmission: No    Patient Information  Admitted from[de-identified] Home  Mental Status: Alert  During Assessment patient was accompanied by: Not accompanied during assessment  Assessment information provided by[de-identified] Patient  Primary Caregiver: Self  Support Systems: Spouse/significant other, Self  [place] of Residence: [facility] [address] [place] [address] do you live in?: [address]: Lives w/ Spouse/significant other  Is patient a ?: No    Activities of Daily Living Prior to Admission  Functional Status: Independent  Completes ADLs independently?: Yes  Ambulates independently?: Yes  Does patient use assisted devices?: No  Does patient currently own DME?: Yes  What DME does the patient currently own?: [employer]  Does patient have a history of Outpatient Therapy (PT/OT)?: Yes  Does the patient have a history of Short-Term Rehab?: No  Does patient have a history of HHC?: No  Does patient currently have [address]?: No    Patient Information Continued  Income Source: Employed  Does patient have prescription coverage?: Yes  Does patient receive dialysis treatments?: No  Does patient have a history of substance abuse?: No  Does patient have a history of Mental Health Diagnosis?: No    Means of Transportation  Means of Transport to \[facility]\""[de-identified] [address] [place] [address] St: Low Risk  (11/21/2023)    Housing Stability Vital Sign     Unable to Pay for Housing in the Last Year: No     Number of Places Lived in the Last Year: 1     Unstable Housing in the Last Year: No   Food Insecurity: No Food Insecurity (11/21/2023)    Hunger Vital Sign     Worried About Running Out of Food in the Last Year: Never true     Ran Out of Food in the Last Year: Never true   Transportation Needs: No Transportation Needs (11/21/2023)    PRAPARE - Transportation     Lack of Transportation (Medical): No     Lack of Transportation (Non-Medical): No   Utilities: Not At Risk (11/21/2023)    [facility] Utilities     Threatened with loss of utilities: No       DISCHARGE DETAILS:    Discharge planning discussed with[de-identified] pt and spouse  Freedom of Choice: Yes     CM contacted family/caregiver?: Yes  Were Treatment Team discharge recommendations reviewed with patient/caregiver?: Yes     Were patient/caregiver advised of the risks associated with not following Treatment Team discharge recommendations?: Yes    Contacts  Patient Contacts: Rasheed Barragan (Spouse) [phone] (H) [phone]  Relationship to Patient[de-identified] Family  Contact Method: Phone, In Person  Phone Number: [phone] Marium Bundy  Reason/Outcome: Continuity of Care, Emergency [address]         Is the patient interested in [address] [facility] at discharge?: No    DME Referral Provided  Referral made for DME?: Yes  DME referral completed for the following items[de-identified] Jesus Blandon  DME Supplier Name[de-identified] [employer]    Other Referral/Resources/Interventions Provided:  Interventions: DME    Treatment Team Recommendation: Home  Discharge Destination Plan[de-identified] Home      CM met with pt and his spouse to discuss d/c planning. Pt was seen by OT/PT. Pt reports he walked with both a walker and without a device. Pt's agreeable to a walker being delivered to the room for him upon d/c. Pt would like to d/c home.  Will await medical clearance from primary team.

## 2023-11-21 NOTE — ASSESSMENT & PLAN NOTE
Attempted control with direct pressure, failed   Merocel placed  ENT consultation for epistaxis management   Nasal packing performed via ENT  To remain in place until 11/24  Anti staph ppx   Nasal precautions Follow-up With  Details  Why  Contact Info   Farhat Matson MD  On 6/22/2020  9:30am  [address]  [address]  [place] [address]  [phone]   Kim Signh MD  On 7/1/2020  9:15am  [address]  [address]  [place] [address]  [phone]       Future Appointments   Date Time Provider Department Center   9/9/2020  8:00 AM Eliseo Eubanks MD [facility] NEURO [facility]       No dme or hh needed.    Discharge rounds on patient. Discussed followup appointments, blue discharge folder, discharge nurse will go over home medications and reasons for medications and final discharge instructions. All patient/caregiver questions answered. Patient verbalized understanding.       06/16/20 0920   Final Note   Assessment Type Final Discharge Note   Anticipated Discharge Disposition Home   Hospital Follow Up  Appt(s) scheduled? Yes   Discharge plans and expectations educations in teach back method with documentation complete? Yes   Right Care Referral Info   Post Acute Recommendation No Care     Thi Rothman, RN, CCM, CMSRN  RN Transition Navigator  [phone]

## 2023-11-21 NOTE — PROGRESS NOTES
Progress Note - Orthopedics   Dinorah Skains 79 y.o. 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 No acute events, continues to offer no complaints. Labs:  0   Lab Value Date/Time    HCT 43 11/20/2023 1507    HCT 47.9 10/05/2023 0747    HCT 46.0 06/07/2022 0752    HCT 46.4 07/06/2021 0751    HCT 49.0 03/08/2016 0815    HCT 42.7 05/30/2014 1232    HCT 45.1 05/21/2014 0723    HGB 14.6 11/20/2023 1507    HGB 15.2 10/05/2023 0747    HGB 15.2 06/07/2022 0752    HGB 15.0 07/06/2021 0751    HGB 15.8 03/08/2016 0815    HGB 14.0 05/30/2014 1232    HGB 15.1 05/21/2014 0723    INR 0.99 10/05/2023 0747    WBC 10.78 (H) 10/05/2023 0747    WBC 11.35 (H) 06/07/2022 0752    WBC 11.03 (H) 07/06/2021 0751    WBC 11.5 (H) 03/08/2016 0815    WBC 17.17 (H) 05/20/2014 0703    WBC 18.71 (H) 05/19/2014 1804       Meds:    Current Facility-Administered Medications:     acetaminophen (TYLENOL) tablet 650 mg, 650 mg, Oral, Q6H PRN, Romayne Ke, MD    ondansetron (ZOFRAN) injection 4 mg, 4 mg, Intravenous, Q4H PRN, Romayne Ke, MD    oxyCODONE (ROXICODONE) immediate release tablet 10 mg, 10 mg, Oral, Q4H PRN, Romayne Ke, MD    oxyCODONE (ROXICODONE) IR tablet 5 mg, 5 mg, Oral, Q4H PRN, Romayne Ke, MD    Blood Culture:   No results found for: "BLOODCX"    Wound Culture:   No results found for: "WOUNDCULT"    Ins and Outs:  No intake/output data recorded. Physical:  Vitals:    11/20/23 1916   BP: (!) 193/90   Pulse: 95   Resp: 17   Temp: 98.1 °F (36.7 °C)   SpO2: 98%     Musculoskeletal: left Upper Extremity  Skin intact with exception of repair laceration. No erythema or ecchymosis.   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 Patient offers no complaints. No orthopedic intervention.     Plan:  WBAT LUE  Rest of care per primary team  Dispo: Matilde Martin for discharge from ortho perspective    Ronny Rangel MD

## 2023-11-21 NOTE — ED PROVIDER NOTES
Emergency Department Airway Evaluation and Management Form    History  Obtained from: EMS  Other  Chief Complaint   Patient presents with    Fall     Pt had a fall with headstrike on Plavix      HPI 21-year-old male with trip and fall with facial injury. Patient is on Plavix. Patient states that he lost his footing and hit his face on concrete. Also has a skin tear to left hand. No vomiting. 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, left     LAST ASSESSED: 27OCT2015     Past Surgical History:   Procedure Laterality Date    COLONOSCOPY      DENTAL SURGERY      HERNIA REPAIR      IR AORTAGRAM WITH RUN-OFF  09/20/2019    IR AORTAGRAM WITH RUN-OFF  11/16/2020    IR LOWER EXTREMITY ANGIOGRAM  10/13/2023    [place] ENDOVEN ABLTJ INCMPTNT VEIN XTR LASER 1ST VEIN Right 07/14/2020    Procedure: ENDOVASCULAR LASER THERAPY (EVLT);   Surgeon: Henrietta Malone MD;  [facility]: [facility];  Service: Vascular    KY RPR 1ST INGUN HRNA AGE 5 YRS/> REDUCIBLE Right 12/07/2016    Procedure: REPAIR HERNIA INGUINAL; HYDROCELECTOMY;  Surgeon: Citlalli Hidalgo MD;  Location: [facility];  Service: General    [place] Elisabetclovis Martell 3RD+ ORD [address] [facility] Right 11/16/2020    Procedure: ARTERIOGRAM, DIAGNOSTIC AORTO ILIAC, RIGHT LOWER EXTREMITY  ANGIOGRAM, ANGIOPLASTY AND STENTING OF SFA/POPLITEAL, ANGIOPLASTY OF ANTERIOTIBIAL/POPLITEAL AND ATHERECTOMY, ANGIOPLASTY OF TIBIOPERINEAL TRUNK, MYNX CLOSURE;  Surgeon: Henrietta Malone MD;  [facility];  Service: Vascular    [place] Elisabetclovis Martell 3RD+ ORD [address] [facility] N/A 10/13/2023    Procedure: right lower extremity arteriogram;  Surgeon: Pat Cohen Jay Zimmerman MD;  Location: [facility];  Service: Vascular    PROSTATE SURGERY  2014    PROSTATECTOMY RADICAL; LAST ASSESSED: 27OCT2015    SKIN CANCER EXCISION      TRANSURETHRAL RESECTION OF PROSTATE  2011    VASCULAR SURGERY       Family History   Problem Relation Age of Onset    Hypertension Mother     Cancer Father     Kidney failure Family      Social History     Tobacco Use    Smoking status: Never    Smokeless tobacco: Never   Vaping Use    Vaping Use: Never used   Substance Use Topics    Alcohol use: Not Currently    Drug use: Never     I have reviewed and agree with the history as documented.     Review of Systems    Physical Exam  /76   Pulse 83   Temp 98.3 °F (36.8 °C)   Resp 16   Ht 5' 5" (1.651 m)   Wt 73.4 kg (161 lb 12.8 oz)   SpO2 97%   BMI 26.92 kg/m²     Physical Exam  On exam patient with nasal bleeding, ecchymosis, nasal deformity, some back bleeding into his oropharynx, but patient is maintaining his airway with no pooling secretions, clear and equal breath sounds, midline trachea, nontender neck  ED Medications  Medications   acetaminophen (TYLENOL) tablet 650 mg (has no administration in time range)   oxyCODONE (ROXICODONE) immediate release tablet 10 mg (has no administration in time range)   oxyCODONE (ROXICODONE) IR tablet 5 mg (has no administration in time range)   ondansetron (ZOFRAN) injection 4 mg (has no administration in time range)   hydrALAZINE (APRESOLINE) injection 5 mg (has no administration in time range)   amLODIPine (NORVASC) tablet 1.25 mg (1.25 mg Oral Given 11/20/23 2247)   latanoprost (XALATAN) 0.005 % ophthalmic solution 1 drop (1 drop Both Eyes Given 11/20/23 2246)   dorzolamide-timolol (COSOPT) 2-0.5 % ophthalmic solution 1 drop (1 drop Both Eyes Not Given 11/20/23 2242)   multivitamin stress formula tablet 1 tablet (has no administration in time range)   pravastatin (PRAVACHOL) tablet 20 mg (has no administration in time range)   metoprolol succinate (TOPROL-XL) 24 hr tablet 25 mg (25 mg Oral Given 11/20/23 2247)   tetanus-diphtheria-acellular pertussis (BOOSTRIX) IM injection 0.5 mL (0.5 mL Intramuscular Given 11/20/23 1511)   lidocaine-epinephrine (XYLOCAINE/EPINEPHRINE) 1 %-1:100,000 injection 10 mL (10 mL Infiltration Given by Other 11/20/23 1528)   oxymetazoline (AFRIN) 0.05 % nasal spray 6 spray (6 sprays Each Nare Given by Other 11/20/23 1648)       Intubation  Procedures    Notes      Final Diagnosis  Final diagnoses:   Bleeding from the nose   Epistaxis due to trauma   Closed traumatic dislocation of metacarpophalangeal (MCP) joint of left thumb   Laceration of left thumb, foreign body presence unspecified, nail damage status unspecified, initial encounter   Laceration of left thumb without foreign body without damage to nail, initial encounter     Head strike on Plavix  ED Provider  Electronically Signed by     Ken Van MD  11/21/23 1401

## 2023-11-21 NOTE — ASSESSMENT & PLAN NOTE
Laceration repair, see procedure note   6-0 prolene, 4 interrupted sutures, removal in 5-7 days  Placed on 11/20

## 2023-11-21 NOTE — ASSESSMENT & PLAN NOTE
bilateral comminuted nasal bone fracture comminuted nasal septal fracture, fracture of medial wall of left lacrimal canal  ENT consultation, non-op management  Nasal precautions

## 2023-11-21 NOTE — PHYSICAL THERAPY NOTE
Physical Therapy Evaluation     Patient's Name: Deandra Titus    Admitting Diagnosis  Pain [R52]  Bleeding from the nose [R04.0]  Laceration of left thumb without foreign body without damage to nail, initial encounter [S61.012A]  Laceration of left thumb, foreign body presence unspecified, nail damage status unspecified, initial encounter [S61.012A]  Epistaxis due to trauma [R04.0]  Closed traumatic dislocation of metacarpophalangeal (MCP) joint of left thumb [S63.115A]    Problem List  Patient Active Problem List   Diagnosis    Inguinal hernia of right side with obstruction    Benign essential hypertension    Prostate cancer ([address])    Hypercholesterolemia    Peripheral vascular disease, unspecified ([address])    Chronic venous insufficiency    Carotid stenosis, asymptomatic    Stage 3a chronic kidney disease ([address])    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 ([address])    Embolism and thrombosis of arteries of the lower extremities ([address])    Atherosclerosis of native artery of both lower extremities with intermittent claudication ([address])    Fall    Closed dislocation of metacarpophalangeal joint of left thumb    Epistaxis due to trauma    Laceration of left thumb    Nasal bone fracture    Atherosclerosis of artery of both lower extremities ([address])    Glaucoma       Past Medical History  Past Medical History:   Diagnosis Date    Basal cell carcinoma, ear, unspecified laterality     LAST ASSESSED: 27OCT2015    Hyperlipidemia     Hypertension     Inguinal hernia     RESOLVED: [id number]    Malignant neoplasm of prostate ([address])     RESOLVED: [id number]    Prostate enlargement     RESOLVED: [id number]    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, left     LAST ASSESSED: 27OCT2015       Past Surgical History  Past Surgical History:   Procedure Laterality Date    COLONOSCOPY      DENTAL SURGERY      HERNIA REPAIR      IR AORTAGRAM WITH RUN-OFF  09/20/2019    IR AORTAGRAM WITH RUN-OFF  11/16/2020    IR LOWER EXTREMITY ANGIOGRAM  10/13/2023    IA ENDOVEN ABLTJ INCMPTNT VEIN XTR LASER 1ST VEIN Right 07/14/2020    Procedure: ENDOVASCULAR LASER THERAPY (EVLT); Surgeon: Cindy Perry MD;  [facility];  Service: Vascular    IA RPR 1ST INGUN HRNA AGE 5 YRS/> REDUCIBLE Right 12/07/2016    Procedure: REPAIR HERNIA INGUINAL; HYDROCELECTOMY;  Surgeon: Katrin Vaca MD;  Location: [facility];  Service: General    IA [facility] 3RD+ ORD [address] [facility] Right 11/16/2020    Procedure: ARTERIOGRAM, DIAGNOSTIC AORTO ILIAC, RIGHT LOWER EXTREMITY  ANGIOGRAM, ANGIOPLASTY AND STENTING OF SFA/POPLITEAL, ANGIOPLASTY OF ANTERIOTIBIAL/POPLITEAL AND ATHERECTOMY, ANGIOPLASTY OF TIBIOPERINEAL TRUNK, MYNX CLOSURE;  Surgeon: Cindy Perry MD;  [facility];  Service: Vascular    IA [facility] 3RD+ ORD [address] [facility] N/A 10/13/2023    Procedure: right lower extremity arteriogram;  Surgeon: Donnie Rico MD;  [facility];  Service: Vascular    PROSTATE SURGERY  2014    PROSTATECTOMY RADICAL; LAST ASSESSED: 27OCT2015    SKIN CANCER EXCISION      TRANSURETHRAL RESECTION OF PROSTATE  2011    VASCULAR SURGERY            11/21/23 1005   PT Last Visit   PT Visit Date 11/21/23   Note Type   Note type Evaluation   Pain Assessment   Pain Assessment Tool 0-10   Pain Score No Pain   Restrictions/Precautions   Weight Bearing Precautions Per Order Yes   LUE Weight Bearing Per Order (S)  WBAT  (Closed dislocation L MCP thumb)   Other Precautions Fall Risk;Hard of hearing;Pain  (B/L Hearing aids)   Home Living   Type of [address] [place] [address] Dr Two level;1/2 bath on main level;Bed/bath upstairs  (1 MARIBEL)   Bathroom Shower/Tub Tub/shower unit   [address] [place]   (No DME)   Additional Comments Pt lives iN 2STH with 1 MARIBEL   Prior Function   Level of [place] Independent with ADLs; Independent with functional mobility; Independent with IADLS   Lives With Spouse   Receives Help From Family   IADLs Independent with driving; Independent with meal prep; Independent with medication management  (Spouse does most of th edriving)   Falls in the last 6 months 1 to 4  (1 leading to [address])   Vocational Retired   Comments Pt reports beign Ind for mobility /ADLs, no AD used pta. Pt with supportive spouse. General   Additional Pertinent History (S)  Pt with fall leading to hospital admission. Pt with nasal bone Fx + Closed L MCP thumb dislocation, is WBAT per Ortho. Family/Caregiver Present Yes   Cognition   Overall Cognitive Status WFL   Arousal/Participation Alert   Attention Within functional limits   Orientation Level Oriented X4   Following Commands Follows one step commands without difficulty   Comments Pt cooperative during session   Subjective   Subjective Agreeable to mobilize   RLE Assessment   RLE Assessment WFL   LLE Assessment   LLE Assessment WFL   Bed Mobility   Supine to Sit 5  Supervision   Sit to Supine Unable to assess   Additional Comments Pt noted to be in bed upon arrival.   Transfers   Sit to Stand 5  Supervision   Additional items Increased time required   Stand to Sit 5  Supervision   Additional items Increased time required   Additional Comments no AD   Ambulation/Elevation   Gait pattern Improper Weight shift;Decreased heel strike  (R side)   Gait Assistance 5  Supervision   Assistive Device Rolling walker   Distance 130 ft   Stair Management Assistance Not tested   Ambulation/Elevation Additional Comments Pt ambulates with RW in hallway during session.  Pt noted to have gait deviations as above,states has difficulty wth RLE at times 2* vascular problems(recent procedure per spouse)   Balance   Static Sitting Good   Dynamic Sitting Fair +   Static Standing Fair   Dynamic Standing [facility] -   Activity Tolerance   Activity Tolerance Patient tolerated treatment well   Medical Staff Made Aware Co-eval with OT 2* medical complexity and multiple co morbidties Nurse Made Aware RN cleared pt for therapy   Assessment   Prognosis Good   Problem List Decreased mobility   Assessment Pt is a 79 y.o. male seen for PT evaluation s/p admit to [facility] on 11/20/2023. Pt was admitted with a primary dx of: Fall, Nasal bone fracture, Epistaxis, Closed dislocation of MCP L thumb , Laceration L thumb. PMH sx for HTN, PVD, Prostate cancer, Chronic venous insufficiency, CKD, Atherosclerosis. Per Ortho consult: WBAT L UE . PT now consulted for assessment of mobility and d/c needs. Pt with Up in chair orders. Pts current clinical presentation is Unstable/ Unpredictable (high complexity) due to Ongoing medical management for primary dx, Increased reliance on more restrictive AD compared to baseline, Fall risk. Prior to admission, pt was Ind for mobility and ADLs. Pt lives with spouse in [facility] spouse is supportive. Upon evaluation, pt currently is requiring supervision for bed mobility , completes transfers and ambulation using RW with supervision . Attempted ambulation without RW ,unsteady and reaching for support . Recommended to use RW at this time for additional support and stability. Pt presents at PT eval functioning below baseline and currently w/ overall mobility deficits 2* to: decreased endurance, gait deviations, decreased activity tolerance compared to baseline, fall risk. Pt currently at a fall risk 2* to impairments listed above. Pt will continue to benefit from skilled acute PT interventions to maximize functional mobility;and DME needs. At conclusion of PT session pt returned back in chair, all needs in reach, and RN notified of session findings/recommendations with phone and call bell within reach. Pt denies any further questions at this time. The patient's AM-PAC Basic Mobility Inpatient Short Form Raw Score is 20. A Raw score of greater than 16 suggests the patient may benefit from discharge to home.  Please also refer to the recommendation of the Physical Therapist for safe discharge planning. Recommend Level III upon hospital D/C, will need RW post dc. Goals   Patient Goals to go home   STG Expiration Date 12/05/23   Short Term Goal #1 STG 2. Pt will be able to perform functional transfer with Ind in order to improve overall functional mobility and assist in safe d/c. STG 4. Pt will be able to ambulate at least 300 feet with least restrictive device with Supervision A in order to improve overall functional mobility and assist in safe d/c. STG 5. Pt will improve sitting/standing static/dynamic balance 1/2 grade in order to improve functional mobility and assist in safe d/c.   PT Treatment Day 0   Plan   Treatment/Interventions Gait training;Elevations; Spoke to nursing;OT   PT Frequency 1-2x/wk   Discharge Recommendation   Rehab Resource Intensity Level, PT III (Minimum Resource Intensity)   Equipment Recommended Walker   AM-PAC Basic Mobility Inpatient   Turning in Flat Bed Without Bedrails 4   Lying on Back to Sitting on Edge of Flat Bed Without Bedrails 4   Moving Bed to Chair 3   Standing Up From Chair Using Arms 3   Walk in Room 3   Climb 3-5 Stairs With Railing 3   Basic Mobility Inpatient Raw Score 20   Basic Mobility Standardized Score 43.99   Highest Level Of Mobility   JH-HLM Goal 6: Walk 10 steps or more   JH-HLM Achieved 7: Walk 25 feet or more   Modified Kvng Scale   Modified [place] Scale 2   Additional Treatment Session   Start Time 1250   End Time 1305   Treatment Assessment Pt seen for additional treatment session this date. Assessed stair negotiation . Pt able to manuever up and down 14 stairs with R HR with supervision . Pt initially negotiated with reciprocal pattern, but somewhat impulsive. Instruction provided on  non reciprocal pattern, pt demonstrates method safely and improved stair negotiation. Pt's spouse verbalizes will be assisting pt with stairs post dc as needed.    Additional Treatment Day 1   End of Consult   Patient Position at End of Consult All needs within reach; Bedside chair         Page Faria, PT DPT

## 2023-11-21 NOTE — PLAN OF CARE
Problem: PHYSICAL THERAPY ADULT  Goal: Performs mobility at highest level of function for planned discharge setting. See evaluation for individualized goals. Description: Treatment/Interventions: Gait training, Elevations, Spoke to nursing, OT  Equipment Recommended: Allie Mcguire       See flowsheet documentation for full assessment, interventions and recommendations. Note: Prognosis: Good  Problem List: Decreased mobility  Assessment: Pt is a 79 y.o. male seen for PT evaluation s/p admit to [address] [place] [address] on 11/20/2023. Pt was admitted with a primary dx of: Fall, Nasal bone fracture, Epistaxis, Closed dislocation of MCP L thumb , Laceration L thumb. PMH sx for HTN, PVD, Prostate cancer, Chronic venous insufficiency, CKD, Atherosclerosis. Per Ortho consult: WBAT L UE . PT now consulted for assessment of mobility and d/c needs. Pt with Up in chair orders. Pts current clinical presentation is Unstable/ Unpredictable (high complexity) due to Ongoing medical management for primary dx, Increased reliance on more restrictive AD compared to baseline, Fall risk. Prior to admission, pt was Ind for mobility and ADLs. Pt lives with spouse in [facility] [place] spouse is supportive. Upon evaluation, pt currently is requiring supervision for bed mobility , completes transfers and ambulation using RW with supervision . Attempted ambulation without RW ,unsteady and reaching for support . Recommended to use RW at this time for additional support and stability. Pt presents at PT eval functioning below baseline and currently w/ overall mobility deficits 2* to: decreased endurance, gait deviations, decreased activity tolerance compared to baseline, fall risk. Pt currently at a fall risk 2* to impairments listed above. Pt will continue to benefit from skilled acute PT interventions to maximize functional mobility;and DME needs.  At conclusion of PT session pt returned back in chair, all needs in reach, and RN notified of session findings/recommendations with phone and call bell within reach. Pt denies any further questions at this time. The patient's AM-PAC Basic Mobility Inpatient Short Form Raw Score is 20. A Raw score of greater than 16 suggests the patient may benefit from discharge to home. Please also refer to the recommendation of the Physical Therapist for safe discharge planning. Recommend Level III upon hospital D/C, will need RW post dc. Rehab Resource Intensity Level, PT: III (Minimum Resource Intensity)    See flowsheet documentation for full assessment.

## 2023-11-22 ENCOUNTER — TRANSITIONAL CARE MANAGEMENT (OUTPATIENT)
Dept: FAMILY MEDICINE CLINIC | Facility: CLINIC | Age: 83
End: 2023-11-22

## 2023-11-22 ENCOUNTER — OFFICE VISIT (OUTPATIENT)
Dept: FAMILY MEDICINE CLINIC | Facility: CLINIC | Age: 83
End: 2023-11-22
Payer: MEDICARE

## 2023-11-22 VITALS
OXYGEN SATURATION: 98 % | TEMPERATURE: 97.3 F | HEIGHT: 65 IN | BODY MASS INDEX: 27.22 KG/M2 | WEIGHT: 163.4 LBS | DIASTOLIC BLOOD PRESSURE: 60 MMHG | HEART RATE: 79 BPM | SYSTOLIC BLOOD PRESSURE: 115 MMHG

## 2023-11-22 DIAGNOSIS — R04.0 EPISTAXIS DUE TO TRAUMA: ICD-10-CM
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
## 2023-11-22 DIAGNOSIS — S02.2XXA CLOSED FRACTURE OF NASAL BONE, INITIAL ENCOUNTER: ICD-10-CM

## 2023-11-22 DIAGNOSIS — Z76.89 ENCOUNTER FOR SUPPORT AND COORDINATION OF TRANSITION OF CARE: Primary | ICD-10-CM

## 2023-11-22 DIAGNOSIS — S61.019A LACERATION OF THUMB WITHOUT FOREIGN BODY WITHOUT DAMAGE TO NAIL, UNSPECIFIED LATERALITY, INITIAL ENCOUNTER: ICD-10-CM

## 2023-11-22 PROBLEM — I10 WHITE COAT SYNDROME WITH DIAGNOSIS OF HYPERTENSION: Status: ACTIVE | Noted: 2023-11-22

## 2023-11-22 PROCEDURE — 99496 TRANSJ CARE MGMT HIGH F2F 7D: CPT | Performed by: FAMILY MEDICINE

## 2023-11-22 NOTE — PROGRESS NOTES
Assessment & Plan     Reviewed signs and symptoms of internal head bleed. 1. Encounter for support and coordination of transition of care    2. Epistaxis due to trauma    3. Closed fracture of nasal bone, initial encounter    4. Laceration of thumb without foreign body without damage to nail, unspecified laterality, initial encounter    5. Fall, initial encounter         Subjective     Transitional Care Management Review:   Roni Pool is a 79 y.o. male here for TCM follow up. During the TCM phone call patient stated:  TCM Call       Date and time call was made  11/22/2023  8:11 AM    Patient was hospitialized at  [address] [place] [address]    Date of Admission  11/20/23    Date of discharge  11/21/23    Diagnosis  Fall,Closed dislocation of metacarpophalangeal joint of left thumb,Epistaxis due to trauma,Laceration of left thumb,Nasal bone fracture,Atherosclerosis of artery of both lower extremities (HCC),Glaucoma    Disposition  Home    Were the patients medications reviewed and updated  Yes          TCM Call       Scheduled for follow up? Yes    I have advised the patient to call PCP with any new or worsening symptoms  Janae MONTEIRO, tripped and fell on an uneven side walk. Feeling Ok, mild HA getting better. Has appetite. Working on hydration. Has F/U with ENT this Friday, sees Eye  January. Epistaxis - drips decreasing daily. Nose packing in place. Review of Systems   Constitutional: Negative. HENT: Negative. Eyes: Negative. Negative for visual disturbance. Respiratory: Negative. Cardiovascular: Negative. Gastrointestinal: Negative. Genitourinary: Negative. Musculoskeletal: Negative. Skin: Negative. Neurological: Negative. Negative for dizziness, tremors, speech difficulty, weakness, light-headedness, numbness and headaches. Psychiatric/Behavioral: Negative.          Objective     /60 (BP Location: Left arm, Patient Position: Sitting, Cuff Size: Standard)   Pulse 79   Temp (!) 97.3 °F (36.3 °C) (Temporal)   Ht 5' 5" (1.651 m)   Wt 74.1 kg (163 lb 6.4 oz)   SpO2 98%   BMI 27.19 kg/m²      Physical Exam  Vitals and nursing note reviewed. Constitutional:       General: He is not in acute distress. Appearance: He is well-developed. HENT:      Head: Normocephalic and atraumatic. Right Ear: External ear normal.      Left Ear: External ear normal.      Nose:      Comments: Swollen     Mouth/Throat:      Mouth: Mucous membranes are moist.      Pharynx: Oropharynx is clear. Eyes:      Extraocular Movements: Extraocular movements intact. Pupils: Pupils are equal, round, and reactive to light. Cardiovascular:      Rate and Rhythm: Normal rate and regular rhythm. Heart sounds: No murmur heard. Pulmonary:      Effort: Pulmonary effort is normal. No respiratory distress. Breath sounds: Normal breath sounds. Abdominal:      Tenderness: There is no abdominal tenderness. Musculoskeletal:         General: No swelling. Cervical back: Neck supple. Skin:     General: Skin is warm and dry. Capillary Refill: Capillary refill takes less than 2 seconds. Neurological:      Mental Status: He is alert and oriented to person, place, and time. Psychiatric:         Mood and Affect: Mood normal.         Behavior: Behavior normal.         Thought Content:  Thought content normal.         Judgment: Judgment normal.       Medications have been reviewed by provider in current encounter    Lacey Cameron DO

## 2023-11-24 ENCOUNTER — HOSPITAL ENCOUNTER (EMERGENCY)
Facility: HOSPITAL | Age: 83
Discharge: HOME | End: 2023-11-24
Attending: EMERGENCY MEDICINE
Payer: MEDICARE

## 2023-11-24 VITALS
SYSTOLIC BLOOD PRESSURE: 204 MMHG | RESPIRATION RATE: 16 BRPM | TEMPERATURE: 96.7 F | DIASTOLIC BLOOD PRESSURE: 84 MMHG | OXYGEN SATURATION: 98 % | HEART RATE: 87 BPM

## 2023-11-24 DIAGNOSIS — Z48.00 ENCOUNTER FOR REMOVAL OF NASAL PACKING: Primary | ICD-10-CM

## 2023-11-24 PROCEDURE — 99283 EMERGENCY DEPT VISIT LOW MDM: CPT | Performed by: EMERGENCY MEDICINE

## 2023-11-24 RX ORDER — OXYMETAZOLINE HYDROCHLORIDE 0.05 G/100ML
2 SPRAY NASAL ONCE
Status: COMPLETED | OUTPATIENT
Start: 2023-11-24 | End: 2023-11-24

## 2023-11-24 RX ADMIN — OXYMETAZOLINE HYDROCHLORIDE 2 SPRAY: 0.05 SPRAY NASAL at 11:27

## 2023-11-24 NOTE — ED PROVIDER NOTES
History  Chief Complaint   Patient presents with    Medical Problem     Patient reports he was told to come to ED today to have Dr. Charlene Bernheim remove packing from left nostril     HPI  Deborah Donato is a 79 y.o. male who presents to the emergency department for nasal packing removal.  Patient was recently discharged from trauma service and had nasal fractures with left nare packing placed for epistaxis. Patient was instructed to return to the ER today to have ENT remove the packing. He denies pain or bleeding, and denies other symptoms. Prior to Admission Medications   Prescriptions Last Dose Informant Patient Reported? Taking? amLODIPine (NORVASC) 2.5 mg tablet  Self No No   Sig: Take 1 tablet (2.5 mg total) by mouth daily at bedtime   Patient taking differently: Take 1.25 mg by mouth daily at bedtime   amoxicillin-clavulanate (AUGMENTIN) 875-125 mg per tablet   No No   Sig: Take 1 tablet by mouth every 12 (twelve) hours for 5 days   aspirin (ECOTRIN LOW STRENGTH) 81 mg EC tablet  Self No No   Sig: opne po 2X a week, eg Monday and Friday.    Patient taking differently: 3 (three) times a week   clopidogrel (Plavix) 75 mg tablet   No No   Sig: Take 1 tablet (75 mg total) by mouth daily   dorzolamide-timolol (COSOPT) 22.3-6.8 MG/ML ophthalmic solution  Self Yes No   Sig: Administer to both eyes 2 (two) times a day   gentamicin (GARAMYCIN) 0.1 % ointment  Self Yes No   Patient not taking: Reported on 11/20/2023   latanoprost (XALATAN) 0.005 % ophthalmic solution  Self Yes No   Sig: Administer to both eyes daily at bedtime   metoprolol succinate (Toprol XL) 25 mg 24 hr tablet  Self No No   Sig: Take 1 tablet (25 mg total) by mouth daily   multivitamin (THERAGRAN) TABS  Self Yes No   Sig: Take 1 tablet by mouth daily   simvastatin (ZOCOR) 20 mg tablet  Self No No   Sig: TAKE 1 TABLET BY MOUTH EVERY DAY      Facility-Administered Medications: None       Past Medical History:   Diagnosis Date    Basal cell carcinoma, ear, unspecified laterality     LAST ASSESSED: 27OCT2015    Hyperlipidemia     Hypertension     Inguinal hernia     RESOLVED: [id number]    Malignant neoplasm of prostate ([address])     RESOLVED: [id number]    Prostate enlargement     RESOLVED: [id number]    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, left     LAST ASSESSED: 27OCT2015       Past Surgical History:   Procedure Laterality Date    COLONOSCOPY      DENTAL SURGERY      HERNIA REPAIR      IR AORTAGRAM WITH RUN-OFF  09/20/2019    IR AORTAGRAM WITH RUN-OFF  11/16/2020    IR LOWER EXTREMITY ANGIOGRAM  10/13/2023    MT ENDOVEN ABLTJ INCMPTNT VEIN XTR LASER 1ST VEIN Right 07/14/2020    Procedure: ENDOVASCULAR LASER THERAPY (EVLT); Surgeon: Ivana Cooks, MD;  [facility]: [facility];  Service: Vascular    MT RPR 1ST INGUN HRNA AGE 5 YRS/> REDUCIBLE Right 12/07/2016    Procedure: REPAIR HERNIA INGUINAL; HYDROCELECTOMY;  Surgeon: Angelita Ochoa MD;  Location: [facility];  Service: General    MT [facility] 3RD+ ORD [id number] [facility] Right 11/16/2020    Procedure: ARTERIOGRAM, DIAGNOSTIC AORTO ILIAC, RIGHT LOWER EXTREMITY  ANGIOGRAM, ANGIOPLASTY AND STENTING OF SFA/POPLITEAL, ANGIOPLASTY OF ANTERIOTIBIAL/POPLITEAL AND ATHERECTOMY, ANGIOPLASTY OF TIBIOPERINEAL TRUNK, MYNX CLOSURE;  Surgeon: Ivana Cooks, MD;  [facility];  Service: Vascular    MT [facility] 3RD+ ORD [id number] [facility] N/A 10/13/2023    Procedure: right lower extremity arteriogram;  Surgeon: Mckinley Lipscomb MD;  [facility];  Service: Vascular    PROSTATE SURGERY  2014    PROSTATECTOMY RADICAL; LAST ASSESSED: 27OCT2015    SKIN CANCER EXCISION      TRANSURETHRAL RESECTION OF PROSTATE  2011    VASCULAR SURGERY         Family History   Problem Relation Age of Onset    Hypertension Mother     Cancer Father     Kidney failure Family      I have reviewed and agree with the history as documented.     E-Cigarette/Vaping    E-Cigarette Use Never User      E-Cigarette/Vaping Substances    Nicotine No     THC No     CBD No     Flavoring No     Other No     Unknown No      Social History     Tobacco Use    Smoking status: Never    Smokeless tobacco: Never   Vaping Use    Vaping Use: Never used   Substance Use Topics    Alcohol use: Not Currently    Drug use: Never       Home medications:  Prior to Admission Medications   Prescriptions Last Dose Informant Patient Reported? Taking? amLODIPine (NORVASC) 2.5 mg tablet  Self No No   Sig: Take 1 tablet (2.5 mg total) by mouth daily at bedtime   Patient taking differently: Take 1.25 mg by mouth daily at bedtime   amoxicillin-clavulanate (AUGMENTIN) 875-125 mg per tablet   No No   Sig: Take 1 tablet by mouth every 12 (twelve) hours for 5 days   aspirin (ECOTRIN LOW STRENGTH) 81 mg EC tablet  Self No No   Sig: opne po 2X a week, eg Monday and Friday. Patient taking differently: 3 (three) times a week   clopidogrel (Plavix) 75 mg tablet   No No   Sig: Take 1 tablet (75 mg total) by mouth daily   dorzolamide-timolol (COSOPT) 22.3-6.8 MG/ML ophthalmic solution  Self Yes No   Sig: Administer to both eyes 2 (two) times a day   gentamicin (GARAMYCIN) 0.1 % ointment  Self Yes No   Patient not taking: Reported on 11/20/2023   latanoprost (XALATAN) 0.005 % ophthalmic solution  Self Yes No   Sig: Administer to both eyes daily at bedtime   metoprolol succinate (Toprol XL) 25 mg 24 hr tablet  Self No No   Sig: Take 1 tablet (25 mg total) by mouth daily   multivitamin (THERAGRAN) TABS  Self Yes No   Sig: Take 1 tablet by mouth daily   simvastatin (ZOCOR) 20 mg tablet  Self No No   Sig: TAKE 1 TABLET BY MOUTH EVERY DAY      Facility-Administered Medications: None     Allergies: Allergies   Allergen Reactions    Other Itching and Rash     SESAME SEEDS,SOB        Review of Systems   Respiratory:  Negative for shortness of breath. Cardiovascular:  Negative for chest pain. Gastrointestinal:  Negative for abdominal pain.    All other systems reviewed and are negative. Physical Exam  ED Triage Vitals [11/24/23 0957]   Temperature Pulse Respirations Blood Pressure SpO2   (!) 96.7 °F (35.9 °C) 87 16 (!) 204/84 98 %      Temp Source Heart Rate Source Patient Position - Orthostatic VS BP Location FiO2 (%)   Temporal Monitor Sitting Left arm --      Pain Score       No Pain             Orthostatic Vital Signs  Vitals:    11/24/23 0957   BP: (!) 204/84   Pulse: 87   Patient Position - Orthostatic VS: Sitting       Physical Exam  Vitals and nursing note reviewed. Constitutional:       General: He is not in acute distress. HENT:      Head:      Comments: Facial ecchymosis     Nose:      Comments: Left nasal packing in place     Mouth/Throat:      Mouth: Mucous membranes are moist.      Pharynx: Oropharynx is clear. Eyes:      Pupils: Pupils are equal, round, and reactive to light. Cardiovascular:      Rate and Rhythm: Normal rate and regular rhythm. Pulmonary:      Effort: Pulmonary effort is normal. No respiratory distress. Breath sounds: No wheezing, rhonchi or rales. Abdominal:      General: Abdomen is flat. There is no distension. Tenderness: There is no abdominal tenderness. There is no guarding or rebound. Skin:     General: Skin is warm and dry. Neurological:      Mental Status: He is alert. ED Medications  Medications   oxymetazoline (AFRIN) 0.05 % nasal spray 2 spray (2 sprays Each Nare Given 11/24/23 1127)       Diagnostic Studies  Results Reviewed       None                   No orders to display         Procedures  Procedures      ED Course                                       MDM  Medical Decision Making    Melody Alvarado is a 79 y.o. male who presents to the emergency department for left nasal packing removal after recent discharge from trauma service. Workup including vital signs, physical exam.  ENT removed nasal packing. Stable for discharge home with ENT follow up, discharge instructions and return precautions given. Disposition  Final diagnoses:   Encounter for removal of nasal packing     Time reflects when diagnosis was documented in both MDM as applicable and the Disposition within this note       Time User Action Codes Description Comment    11/24/2023  1:32 PM Alicia Sheppard Add [Z48.00] Encounter for removal of nasal packing           ED Disposition       ED Disposition   Discharge    Condition   Stable    Date/Time   Fri Nov 24, 2023  1:32 PM    Comment   Julia Rodger discharge to home/self care.                    Follow-up Information       Follow up With Specialties Details Why Contact Info Additional [address] [facility] Otolaryngology Call in 3 day(s) Nasal packing removal, follow up in 7-10 days [address] [place] [phone]  [address], [facility], [address] [place] [address], [address]            Discharge Medication List as of 11/24/2023  1:34 PM        CONTINUE these medications which have NOT CHANGED    Details   amLODIPine (NORVASC) 2.5 mg tablet Take 1 tablet (2.5 mg total) by mouth daily at bedtime, Starting Tue 8/22/2023, Normal      amoxicillin-clavulanate (AUGMENTIN) 875-125 mg per tablet Take 1 tablet by mouth every 12 (twelve) hours for 5 days, Starting Tue 11/21/2023, Until Sun 11/26/2023, Normal      aspirin (ECOTRIN LOW STRENGTH) 81 mg EC tablet opne po 2X a week, eg Monday and Friday., No Print      clopidogrel (Plavix) 75 mg tablet Take 1 tablet (75 mg total) by mouth daily, Starting Thu 11/2/2023, Normal      dorzolamide-timolol (COSOPT) 22.3-6.8 MG/ML ophthalmic solution Administer to both eyes 2 (two) times a day, Starting Tue 11/9/2021, Historical Med      gentamicin (GARAMYCIN) 0.1 % ointment Historical Med      latanoprost (XALATAN) 0.005 % ophthalmic solution Administer to both eyes daily at bedtime, Starting Tue 11/9/2021, Historical Med      metoprolol succinate (Toprol XL) 25 mg 24 hr tablet Take 1 tablet (25 mg total) by mouth daily, Starting Tue 8/22/2023, Normal      multivitamin (THERAGRAN) TABS Take 1 tablet by mouth daily, Historical Med      simvastatin (ZOCOR) 20 mg tablet TAKE 1 TABLET BY MOUTH EVERY DAY, Normal             No discharge procedures on file. PDMP Review       None             ED Provider  Attending physically available and evaluated Lizette Cervantes. I managed the patient along with the ED Attending. Electronically Signed by    Portions of the record may have been created with voice recognition software. Occasional wrong word or "sound a like" substitutions may have occurred due to the inherent limitations of voice recognition software.   Read the chart carefully and recognize, using context, where substitutions have occurred       Chris Parikh MD  11/24/23 8155

## 2023-11-24 NOTE — ED ATTENDING ATTESTATION
11/24/2023  I, Malathi Meeks DO, saw and evaluated the patient. I have discussed the patient with the resident/non-physician practitioner and agree with the resident's/non-physician practitioner's findings, Plan of Care, and MDM as documented in the resident's/non-physician practitioner's note, except where noted. All available labs and Radiology studies were reviewed. I was present for key portions of any procedure(s) performed by the resident/non-physician practitioner and I was immediately available to provide assistance. At this point I agree with the current assessment done in the Emergency Department. I have conducted an independent evaluation of this patient a history and physical is as follows: 41-year-old male presents emergency department with nasal packing after trauma evaluation. The ENT told the patient to come into the emergency department for removal of the packing at this point time. Pain is well controlled. Further bleeding. Patient does have noted nasal bone fractures. Will contact ear nose and throat to remove the packing in the emergency department discharged home.     ED Course         Critical Care Time  Procedures

## 2023-11-24 NOTE — PROCEDURES
Labor Daily Progress Note      Chief Complaint   Patient presents with   • Scheduled Induction     HISTORY OF PRESENT ILLNESS:  Edmundo Torrez is a 31 year old  female at 37w1d, JACLYN:10/23/2020, by Other Basis.     Pregnancy complicated by GMA1 and umbilical vein varix.    Reports no complaints. Up and walking around.    Fetal movement present.    Denies HA/visual changes/CP/SOB/abdominal pain/LE discomfort.  Pain is controlled.    PHYSICAL EXAM:  Vitals with min/max:    Vital Last Value 24 Hour Range   Temperature 98.3 °F (36.8 °C) (10/02/20 2134) Temp  Min: 98.3 °F (36.8 °C)  Max: 98.3 °F (36.8 °C)   Pulse 90 (10/03/20 0608) Pulse  Min: 90  Max: 106   Respiratory 14 (10/02/20 2134) Resp  Min: 14  Max: 14   Non-Invasive  Blood Pressure (!) 80/51 (10/03/20 0608) BP  Min: 75/41  Max: 100/60   Pulse Oximetry   No data recorded   Arterial   Blood Pressure   No data recorded       Pertinent vital signs abnormalities:  Low blood pressures noted in the 70-80s/40-50s.  General: Alert, normal affect, no apparent distress.  Skin: Normal color, texture, turgor, no rashes/bruises/active lesions.  Lungs: Clear to auscultation bilaterally, no rales/rhonchi/wheezes; normal effort.   Heart: Regular rate and rhythm, no murmurs/rubs/gallops.   Abdomen: Gravid, soft, non-tender, normal active bowel sounds.  Extremities: No clubbing, no cyanosis, no edema; normal muscle tone and development bilaterally.  Neurologic: No tremor or clonus, no focal deficits.    FETAL SURVEILLANCE:  Fetal Heart Rate Baseline: 110  Variability: Moderate  Accelerations: Present  Uterine Activity: Irregular contractions    GBS: Negative    Recent Labs   Lab 10/02/20  2207 10/03/20  06   GLUCOSE BEDSIDE 142* 76     Sterile Vaginal Examination (last check): /3/     ASSESSMENT:  Edmundo Torrez is a 31 year old  female at 37w0d who presents for IOL secondary to GDMA1 and umbilical vein varix.     Fetal Heart Rate Interpretation: Category  Nasal Packing removal from left nare. Merocel soaked with Afrin, pulled straight out of nose without difficulty. Patient tolerated the procedure well. Patient instructed to take epistaxis precautions, sneezing with mouth open, avoid bending over and what to do in case of repeat nosebleed. Patient and wife verbalized understanding. I    PLAN:  1. IOL  1. Admit to labor and delivery  2. Pain control: patient desiring no medications or epidural at this time  3. GBS negative  4. Labs ordered: CBC, Type and Screen  5. Carb consistent diet at this time  6. Klein balloon placed for cervical ripening, still in place  7. Plan to add pitocin in addition to klein  8. Ambulation as tolerated for now  9. Rapid Covid screen negative     2. GDMA1  1. Following with MFM for this in pregnancy  2. BS checks fasting and 2hrs post prandial while ripening, q2h in latent labor and q1h in active labor  3. Insulin GTT if BS >110     3. Umbilical vein varix  1. No evidence of thrombus on U/S     4. PP cares             1. PPBC: IUD at 6 weeks post partum vs immediate post placental IUD, will readdress    DISPOSITION:  Anticipate normal spontaneous vaginal delivery.    Usha Henderson MD  Family Medicine, PGY1  10/03/20        Patient seen and examined - still comfortable. FHTs - cat 1 . Mild irregular contractions. BS - 80s.   VSS. - continue IOL

## 2023-11-24 NOTE — DISCHARGE INSTR - AVS FIRST PAGE
[facility]    Epistaxis precautions/Treatment after Nasal Cautery    If your nose has been cauterized, try to avoid lifting heavy objects and/or bending for a few days. Also, try to minimize nose blowing. When sneezing or coughing, keep your mouth open to prevent pressure buildup in the nose. You can also use saine nasal spray (Ayr, Kingfisher, or any generic brand) 2 - 3 times per day to help with crusting or increased mucous. With cautery, your body produces increased mucous to help heal the crusting (scab). During an acute episode of epistaxis (nasal bleeding), apply Afrin (oxymetazoline if generic is used) nasal spray (available over the counter in most pharmacies) onto a cotton ball and insert it into the side that is bleeding. Then apply direct pressure by squeezing the soft part of your nose, where your nostrils are. Hold pressure without letting go for at least 10 minutes. If the bleeding continues remove the cotton ball and repeat this process 1 time.  If the bleeding continues call our office or go to the emergency room

## 2023-12-01 LAB
DME PARACHUTE DELIVERY DATE ACTUAL: NORMAL
DME PARACHUTE DELIVERY DATE REQUESTED: NORMAL
DME PARACHUTE ITEM DESCRIPTION: NORMAL
DME PARACHUTE ORDER STATUS: NORMAL
DME PARACHUTE SUPPLIER NAME: NORMAL
DME PARACHUTE SUPPLIER PHONE: NORMAL

## 2024-01-19 PROBLEM — S61.012A LACERATION OF LEFT THUMB: Status: RESOLVED | Noted: 2023-11-20 | Resolved: 2024-01-19

## 2024-02-16 DIAGNOSIS — I10 BENIGN ESSENTIAL HYPERTENSION: ICD-10-CM

## 2024-02-16 RX ORDER — AMLODIPINE BESYLATE 2.5 MG/1
TABLET ORAL
Qty: 90 TABLET | Refills: 1 | Status: SHIPPED | OUTPATIENT
Start: 2024-02-16

## 2024-02-23 ENCOUNTER — OFFICE VISIT (OUTPATIENT)
Dept: FAMILY MEDICINE CLINIC | Facility: CLINIC | Age: 84
End: 2024-02-23
Payer: MEDICARE

## 2024-02-23 VITALS
DIASTOLIC BLOOD PRESSURE: 70 MMHG | BODY MASS INDEX: 28.46 KG/M2 | TEMPERATURE: 98 F | OXYGEN SATURATION: 99 % | SYSTOLIC BLOOD PRESSURE: 120 MMHG | RESPIRATION RATE: 18 BRPM | HEIGHT: 65 IN | HEART RATE: 71 BPM | WEIGHT: 170.8 LBS

## 2024-02-23 DIAGNOSIS — I10 BENIGN ESSENTIAL HYPERTENSION: ICD-10-CM

## 2024-02-23 DIAGNOSIS — N18.31 STAGE 3A CHRONIC KIDNEY DISEASE (HCC): ICD-10-CM

## 2024-02-23 DIAGNOSIS — Z00.00 MEDICARE ANNUAL WELLNESS VISIT, INITIAL: Primary | ICD-10-CM

## 2024-02-23 DIAGNOSIS — I70.203 ATHEROSCLEROSIS OF ARTERY OF BOTH LOWER EXTREMITIES (HCC): ICD-10-CM

## 2024-02-23 DIAGNOSIS — C61 PROSTATE CANCER (HCC): ICD-10-CM

## 2024-02-23 DIAGNOSIS — I10 WHITE COAT SYNDROME WITH DIAGNOSIS OF HYPERTENSION: ICD-10-CM

## 2024-02-23 PROBLEM — L97.318: Status: RESOLVED | Noted: 2022-09-26 | Resolved: 2024-02-23

## 2024-02-23 PROBLEM — I74.3 EMBOLISM AND THROMBOSIS OF ARTERIES OF THE LOWER EXTREMITIES (HCC): Status: RESOLVED | Noted: 2022-09-26 | Resolved: 2024-02-23

## 2024-02-23 PROCEDURE — 99214 OFFICE O/P EST MOD 30 MIN: CPT | Performed by: FAMILY MEDICINE

## 2024-02-23 PROCEDURE — G0439 PPPS, SUBSEQ VISIT: HCPCS | Performed by: FAMILY MEDICINE

## 2024-02-23 RX ORDER — METOPROLOL SUCCINATE 25 MG/1
25 TABLET, EXTENDED RELEASE ORAL DAILY
Qty: 90 TABLET | Refills: 1 | Status: SHIPPED | OUTPATIENT
Start: 2024-02-23 | End: 2024-02-23 | Stop reason: SDUPTHER

## 2024-02-23 NOTE — PROGRESS NOTES
Assessment and Plan:     Recommend a cane for assistance.          Problem List Items Addressed This Visit       Benign essential hypertension    Relevant Medications    metoprolol succinate (Toprol XL) 25 mg 24 hr tablet    Prostate cancer (HCC)    Stage 3a chronic kidney disease (HCC)    Atherosclerosis of artery of both lower extremities (HCC)    White coat syndrome with diagnosis of hypertension    Relevant Medications    metoprolol succinate (Toprol XL) 25 mg 24 hr tablet     Other Visit Diagnoses       Medicare annual wellness visit, initial    -  Primary             Preventive health issues were discussed with patient, and age appropriate screening tests were ordered as noted in patient's After Visit Summary.  Personalized health advice and appropriate referrals for health education or preventive services given if needed, as noted in patient's After Visit Summary.     History of Present Illness:     Patient presents for a Medicare Wellness Visit    Medicare PE.  Home BP's in Ok range - 125/79 to 162/92.  Gets anxious taking BP.     I have spent a total time of 30 minutes on 02/27/24 in caring for this patient including Diagnostic results, Risks and benefits of tx options, Instructions for management, Patient and family education, Importance of tx compliance, Risk factor reductions, and Impressions.    Patient Care Team:  Segundo Choi DO as PCP - General  MD Jered Garvin MD (Inactive)  DO Juan C Valdivia MD (Ophthalmology)     Review of Systems:     Review of Systems   Constitutional: Negative.    HENT: Negative.     Eyes: Negative.    Respiratory: Negative.     Cardiovascular: Negative.    Gastrointestinal: Negative.    Genitourinary: Negative.    Musculoskeletal: Negative.    Skin: Negative.    Neurological: Negative.    Psychiatric/Behavioral: Negative.          Problem List:     Patient Active Problem List   Diagnosis    Inguinal hernia of right side with obstruction     Benign essential hypertension    Prostate cancer (HCC)    Hypercholesterolemia    Peripheral vascular disease, unspecified (HCC)    Chronic venous insufficiency    Carotid stenosis, asymptomatic    Stage 3a chronic kidney disease (HCC)    Atherosclerosis of native artery of both lower extremities with intermittent claudication (HCC)    Fall    Closed dislocation of metacarpophalangeal joint of left thumb    Epistaxis due to trauma    Nasal bone fracture    Atherosclerosis of artery of both lower extremities (HCC)    Glaucoma    White coat syndrome with diagnosis of hypertension      Past Medical and Surgical History:     Past Medical History:   Diagnosis Date    Basal cell carcinoma, ear, unspecified laterality     LAST ASSESSED: 27OCT2015    Hyperlipidemia     Hypertension     Inguinal hernia     RESOLVED: [id number]    Malignant neoplasm of prostate (HCC)     RESOLVED: 27OCT2015    Prostate enlargement     RESOLVED: 27OCT2015    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, left     LAST ASSESSED: 27OCT2015     Past Surgical History:   Procedure Laterality Date    COLONOSCOPY      DENTAL SURGERY      HERNIA REPAIR      IR AORTAGRAM WITH RUN-OFF  09/20/2019    IR AORTAGRAM WITH RUN-OFF  11/16/2020    IR LOWER EXTREMITY ANGIOGRAM  10/13/2023    N[place] ENDOVEN ABLTJ INCMPTNT VEIN XTR LASER 1ST VEIN Right 07/14/2020    Procedure: ENDOVASCULAR LASER THERAPY (EVLT);  Surgeon: El Townsend MD;  Location: [facility];  Service: Vascular    NJ RPR 1ST INGUN HRNA AGE 4 YRS/> REDUCIBLE Right 12/07/2016    Procedure: REPAIR HERNIA INGUINAL; HYDROCELECTOMY;  Surgeon: Jason Moise MD;  Location: [facility];  Service: General    NJ SLCTV CATHJ 3RD+ ORD SLCTV ABDL PEL/LXTR BRNCH Right 11/16/2020    Procedure: ARTERIOGRAM, DIAGNOSTIC AORTO ILIAC, RIGHT LOWER EXTREMITY  ANGIOGRAM, ANGIOPLASTY AND STENTING OF SFA/POPLITEAL, ANGIOPLASTY OF ANTERIOTIBIAL/POPLITEAL AND ATHERECTOMY, ANGIOPLASTY OF TIBIOPERINEAL TRUNK, MYNX CLOSURE;   Surgeon: El Townsend MD;  Location: [facility];  Service: Vascular    MT SLCTV CATHJ 3RD+ ORD SLCTV ABDL PEL/LXTR BRNCH N/A 10/13/2023    Procedure: right lower extremity arteriogram;  Surgeon: Ralph Bran MD;  Location: [facility];  Service: Vascular    PROSTATE SURGERY  2014    PROSTATECTOMY RADICAL; LAST ASSESSED: 27OCT2015    SKIN CANCER EXCISION      TRANSURETHRAL RESECTION OF PROSTATE  2011    VASCULAR SURGERY        Family History:     Family History   Problem Relation Age of Onset    Hypertension Mother     Cancer Father     Kidney failure Family       Social History:     Social History     Socioeconomic History    Marital status: /Civil Union     Spouse name: None    Number of children: None    Years of education: None    Highest education level: None   Occupational History    Occupation: RETIRED      Employer: [employer]     Comment: FACULTY    Tobacco Use    Smoking status: Never    Smokeless tobacco: Never   Vaping Use    Vaping status: Never Used   Substance and Sexual Activity    Alcohol use: Not Currently    Drug use: Never    Sexual activity: None   Other Topics Concern    None   Social History Narrative    Always uses seat belt    No advance directives     Social Determinants of Health     Financial Resource Strain: Low Risk  (2/23/2024)    Overall Financial Resource Strain (CARDIA)     Difficulty of Paying Living Expenses: Not hard at all   Food Insecurity: No Food Insecurity (11/21/2023)    Hunger Vital Sign     Worried About Running Out of Food in the Last Year: Never true     Ran Out of Food in the Last Year: Never true   Transportation Needs: No Transportation Needs (2/23/2024)    PRAPARE - Transportation     Lack of Transportation (Medical): No     Lack of Transportation (Non-Medical): No   Physical Activity: Not on file   Stress: Not on file   Social Connections: Not on file   Intimate Partner Violence: Not on file   Housing Stability: Low Risk  (11/21/2023)     Housing Stability Vital Sign     Unable to Pay for Housing in the Last Year: No     Number of Places Lived in the Last Year: 1     Unstable Housing in the Last Year: No      Medications and Allergies:     Current Outpatient Medications   Medication Sig Dispense Refill    amLODIPine (NORVASC) 2.5 mg tablet TAKE 1 TABLET (2.5 MG TOTAL) BY MOUTH EVERY DAY AT BEDTIME 90 tablet 1    aspirin (ECOTRIN LOW STRENGTH) 81 mg EC tablet opne po 2X a week, eg Monday and Friday. (Patient taking differently: 3 (three) times a week)      clopidogrel (Plavix) 75 mg tablet Take 1 tablet (75 mg total) by mouth daily 90 tablet 3    dorzolamide-timolol (COSOPT) 22.3-6.8 MG/ML ophthalmic solution Administer to both eyes 2 (two) times a day      latanoprost (XALATAN) 0.005 % ophthalmic solution Administer to both eyes daily at bedtime      metoprolol succinate (Toprol XL) 25 mg 24 hr tablet Take 1 tablet (25 mg total) by mouth daily 90 tablet 1    multivitamin (THERAGRAN) TABS Take 1 tablet by mouth daily      simvastatin (ZOCOR) 20 mg tablet TAKE 1 TABLET BY MOUTH EVERY DAY 90 tablet 1     No current facility-administered medications for this visit.     Allergies   Allergen Reactions    Other Itching and Rash     SESAME SEEDS,SOB      Immunizations:     Immunization History   Administered Date(s) Administered    COVID-19 PFIZER VACCINE 0.3 ML IM 01/21/2021, 02/11/2021, 10/08/2021    COVID-19 Pfizer Vac BIVALENT Trevor-sucrose 12 Yr+ IM 10/08/2022    COVID-19 Pfizer mRNA vacc PF trevor-sucrose 12 yr and older (Comirnaty) 09/25/2023    COVID-19 Pfizer vac (Trevor-sucrose, gray cap) 12 yr+ IM 04/06/2022    H1N1, All Formulations 01/08/2010    INFLUENZA 09/23/2015, 10/05/2016, 10/09/2017, 10/15/2018, 09/25/2023    Influenza Split High Dose Preservative Free IM 09/23/2015, 10/05/2016    Influenza, seasonal, injectable 10/09/2017    Pneumococcal Conjugate 13-Valent 10/09/2017    Pneumococcal Polysaccharide PPV23 12/19/2018    Tdap 12/19/2018,  11/20/2023    influenza, trivalent, adjuvanted 10/08/2019      Health Maintenance:         Topic Date Due    Colorectal Cancer Screening  Never done    Hepatitis C Screening  Completed         Topic Date Due    COVID-19 Vaccine (7 - 2023-24 season) 11/20/2023      Medicare Screening Tests and Risk Assessments:     Raza is here for his Initial Wellness visit.     Health Risk Assessment:   Patient rates overall health as good. Patient feels that their physical health rating is same. Patient is satisfied with their life. Eyesight was rated as same. Hearing was rated as same. Patient feels that their emotional and mental health rating is same. Patients states they are never, rarely angry. Patient states they are never, rarely unusually tired/fatigued. Pain experienced in the last 7 days has been none. Patient states that he has experienced no weight loss or gain in last 6 months.     Depression Screening:   PHQ-2 Score: 0      Fall Risk Screening:   In the past year, patient has experienced: history of falling in past year    Number of falls: 1  Injured during fall?: Yes    Feels unsteady when standing or walking?: No    Worried about falling?: No      Home Safety:  Patient does not have trouble with stairs inside or outside of their home. Patient has working smoke alarms and has working carbon monoxide detector. Home safety hazards include: none.     Nutrition:   Current diet is Regular.     Medications:   Patient is currently taking over-the-counter supplements. OTC medications include: see medication list. Patient is able to manage medications.     Activities of Daily Living (ADLs)/Instrumental Activities of Daily Living (IADLs):   Walk and transfer into and out of bed and chair?: Yes  Dress and groom yourself?: Yes    Bathe or shower yourself?: Yes    Feed yourself? Yes  Do your laundry/housekeeping?: Yes  Manage your money, pay your bills and track your expenses?: Yes  Make your own meals?: Yes    Do your own  shopping?: Yes    Previous Hospitalizations:   Any hospitalizations or ED visits within the last 12 months?: No      Advance Care Planning:   Living will: No    Durable POA for healthcare: No    Advanced directive: No    Advanced directive counseling given: Yes    ACP document given: Yes      Cognitive Screening:   Provider or family/friend/caregiver concerned regarding cognition?: No    PREVENTIVE SCREENINGS      Cardiovascular Screening:    General: History Lipid Disorder and Risks and Benefits Discussed      Diabetes Screening:     General: Screening Current      Colorectal Cancer Screening:     General: Screening Not Indicated      Prostate Cancer Screening:    General: History Prostate Cancer and Screening Not Indicated      Osteoporosis Screening:    General: Screening Not Indicated      Abdominal Aortic Aneurysm (AAA) Screening:        General: Screening Not Indicated      Lung Cancer Screening:     General: Screening Not Indicated      Hepatitis C Screening:    General: Screening Current    Screening, Brief Intervention, and Referral to Treatment (SBIRT)    Screening  Typical number of drinks in a day: 0  Typical number of drinks in a week: 0  Interpretation: Low risk drinking behavior.    AUDIT-C Screenin) How often did you have a drink containing alcohol in the past year? monthly or less  2) How many drinks did you have on a typical day when you were drinking in the past year? 1 to 2  3) How often did you have 6 or more drinks on one occasion in the past year? never    AUDIT-C Score: 1  Interpretation: Score 0-3 (male): Negative screen for alcohol misuse    Single Item Drug Screening:  How often have you used an illegal drug (including marijuana) or a prescription medication for non-medical reasons in the past year? never    Single Item Drug Screen Score: 0  Interpretation: Negative screen for possible drug use disorder    Other Counseling Topics:   Car/seat belt/driving safety, skin self-exam,  "sunscreen and calcium and vitamin D intake and regular weightbearing exercise.     No results found.     Physical Exam:     /70 (BP Location: Left arm, Patient Position: Sitting, Cuff Size: Standard)   Pulse 71   Temp 98 °F (36.7 °C) (Oral)   Resp 18   Ht 5' 5\" (1.651 m)   Wt 77.5 kg (170 lb 12.8 oz)   SpO2 99%   BMI 28.42 kg/m²     Physical Exam  Vitals and nursing note reviewed.   Constitutional:       General: He is not in acute distress.     Appearance: He is well-developed.      Comments: Face looks much improved.   HENT:      Head: Normocephalic and atraumatic.      Right Ear: External ear normal.      Left Ear: External ear normal.      Mouth/Throat:      Mouth: Mucous membranes are moist.      Pharynx: Oropharynx is clear.   Eyes:      Conjunctiva/sclera: Conjunctivae normal.   Cardiovascular:      Rate and Rhythm: Normal rate and regular rhythm.      Heart sounds: No murmur heard.  Pulmonary:      Effort: Pulmonary effort is normal. No respiratory distress.      Breath sounds: Normal breath sounds.   Abdominal:      General: Abdomen is flat. Bowel sounds are normal.      Palpations: Abdomen is soft.      Tenderness: There is no abdominal tenderness.   Musculoskeletal:         General: No swelling.      Cervical back: Neck supple.   Skin:     General: Skin is warm and dry.      Capillary Refill: Capillary refill takes less than 2 seconds.   Neurological:      Mental Status: He is alert.   Psychiatric:         Mood and Affect: Mood normal.         Behavior: Behavior normal.         Thought Content: Thought content normal.         Judgment: Judgment normal.          Segundo Choi,   "

## 2024-02-23 NOTE — PATIENT INSTRUCTIONS
Medicare Preventive Visit Patient Instructions  Thank you for completing your Welcome to Medicare Visit or Medicare Annual Wellness Visit today. Your next wellness visit will be due in one year (2/23/2025).  The screening/preventive services that you may require over the next 5-10 years are detailed below. Some tests may not apply to you based off risk factors and/or age. Screening tests ordered at today's visit but not completed yet may show as past due. Also, please note that scanned in results may not display below.  Preventive Screenings:  Service Recommendations Previous Testing/Comments   Colorectal Cancer Screening  Colonoscopy    Fecal Occult Blood Test (FOBT)/Fecal Immunochemical Test (FIT)  Fecal DNA/Cologuard Test  Flexible Sigmoidoscopy Age: 45-75 years old   Colonoscopy: every 10 years (May be performed more frequently if at higher risk)  OR  FOBT/FIT: every 1 year  OR  Cologuard: every 3 years  OR  Sigmoidoscopy: every 5 years  Screening may be recommended earlier than age 45 if at higher risk for colorectal cancer. Also, an individualized decision between you and your healthcare provider will decide whether screening between the ages of 76-85 would be appropriate. Colonoscopy: Not on file  FOBT/FIT: Not on file  Cologuard: Not on file  Sigmoidoscopy: Not on file    Screening Not Indicated     Prostate Cancer Screening Individualized decision between patient and health care provider in men between ages of 55-69   Medicare will cover every 12 months beginning on the day after your 50th birthday PSA: <0.01 ng/mL     History Prostate Cancer  Screening Not Indicated     Hepatitis C Screening Once for adults born between 1945 and 1965  More frequently in patients at high risk for Hepatitis C Hep C Antibody: 07/14/2020    Screening Current   Diabetes Screening 1-2 times per year if you're at risk for diabetes or have pre-diabetes Fasting glucose: 106 mg/dL (10/5/2023)  A1C: No results in last 5 years (No  results in last 5 years)  Screening Current   Cholesterol Screening Once every 5 years if you don't have a lipid disorder. May order more often based on risk factors. Lipid panel: 06/07/2022  Screening Not Indicated  History Lipid Disorder      Other Preventive Screenings Covered by Medicare:  Abdominal Aortic Aneurysm (AAA) Screening: covered once if your at risk. You're considered to be at risk if you have a family history of AAA or a male between the age of 65-75 who smoking at least 100 cigarettes in your lifetime.  Lung Cancer Screening: covers low dose CT scan once per year if you meet all of the following conditions: (1) Age 55-77; (2) No signs or symptoms of lung cancer; (3) Current smoker or have quit smoking within the last 15 years; (4) You have a tobacco smoking history of at least 20 pack years (packs per day x number of years you smoked); (5) You get a written order from a healthcare provider.  Glaucoma Screening: covered annually if you're considered high risk: (1) You have diabetes OR (2) Family history of glaucoma OR (3)  aged 50 and older OR (4)  American aged 65 and older  Osteoporosis Screening: covered every 2 years if you meet one of the following conditions: (1) Have a vertebral abnormality; (2) On glucocorticoid therapy for more than 3 months; (3) Have primary hyperparathyroidism; (4) On osteoporosis medications and need to assess response to drug therapy.  HIV Screening: covered annually if you're between the age of 15-65. Also covered annually if you are younger than 15 and older than 65 with risk factors for HIV infection. For pregnant patients, it is covered up to 3 times per pregnancy.    Immunizations:  Immunization Recommendations   Influenza Vaccine Annual influenza vaccination during flu season is recommended for all persons aged >= 6 months who do not have contraindications   Pneumococcal Vaccine   * Pneumococcal conjugate vaccine = PCV13 (Prevnar 13), PCV15  (Vaxneuvance), PCV20 (Prevnar 20)  * Pneumococcal polysaccharide vaccine = PPSV23 (Pneumovax) Adults 19-65 yo with certain risk factors or if 65+ yo  If never received any pneumonia vaccine: recommend Prevnar 20 (PCV20)  Give PCV20 if previously received 1 dose of PCV13 or PPSV23   Hepatitis B Vaccine 3 dose series if at intermediate or high risk (ex: diabetes, end stage renal disease, liver disease)   Respiratory syncytial virus (RSV) Vaccine - COVERED BY MEDICARE PART D  * RSVPreF3 (Arexvy) CDC recommends that adults 60 years of age and older may receive a single dose of RSV vaccine using shared clinical decision-making (SCDM)   Tetanus (Td) Vaccine - COST NOT COVERED BY MEDICARE PART B Following completion of primary series, a booster dose should be given every 10 years to maintain immunity against tetanus. Td may also be given as tetanus wound prophylaxis.   Tdap Vaccine - COST NOT COVERED BY MEDICARE PART B Recommended at least once for all adults. For pregnant patients, recommended with each pregnancy.   Shingles Vaccine (Shingrix) - COST NOT COVERED BY MEDICARE PART B  2 shot series recommended in those 19 years and older who have or will have weakened immune systems or those 50 years and older     Health Maintenance Due:      Topic Date Due   • Colorectal Cancer Screening  Never done   • Hepatitis C Screening  Completed     Immunizations Due:      Topic Date Due   • COVID-19 Vaccine (7 - 2023-24 season) 11/20/2023     Advance Directives   What are advance directives?  Advance directives are legal documents that state your wishes and plans for medical care. These plans are made ahead of time in case you lose your ability to make decisions for yourself. Advance directives can apply to any medical decision, such as the treatments you want, and if you want to donate organs.   What are the types of advance directives?  There are many types of advance directives, and each state has rules about how to use them. You  may choose a combination of any of the following:  Living will:  This is a written record of the treatment you want. You can also choose which treatments you do not want, which to limit, and which to stop at a certain time. This includes surgery, medicine, IV fluid, and tube feedings.   Durable power of  for healthcare (DPAHC):  This is a written record that states who you want to make healthcare choices for you when you are unable to make them for yourself. This person, called a proxy, is usually a family member or a friend. You may choose more than 1 proxy.  Do not resuscitate (DNR) order:  A DNR order is used in case your heart stops beating or you stop breathing. It is a request not to have certain forms of treatment, such as CPR. A DNR order may be included in other types of advance directives.  Medical directive:  This covers the care that you want if you are in a coma, near death, or unable to make decisions for yourself. You can list the treatments you want for each condition. Treatment may include pain medicine, surgery, blood transfusions, dialysis, IV or tube feedings, and a ventilator (breathing machine).  Values history:  This document has questions about your views, beliefs, and how you feel and think about life. This information can help others choose the care that you would choose.  Why are advance directives important?  An advance directive helps you control your care. Although spoken wishes may be used, it is better to have your wishes written down. Spoken wishes can be misunderstood, or not followed. Treatments may be given even if you do not want them. An advance directive may make it easier for your family to make difficult choices about your care.   Fall Prevention    Fall prevention  includes ways to make your home and other areas safer. It also includes ways you can move more carefully to prevent a fall. Health conditions that cause changes in your blood pressure, vision, or muscle  strength and coordination may increase your risk for falls. Medicines may also increase your risk for falls if they make you dizzy, weak, or sleepy.   Fall prevention tips:   Stand or sit up slowly.    Use assistive devices as directed.    Wear shoes that fit well and have soles that .    Wear a personal alarm.    Stay active.    Manage your medical conditions.    Home Safety Tips:  Add items to prevent falls in the bathroom.    Keep paths clear.    Install bright lights in your home.    Keep items you use often on shelves within reach.    Paint or place reflective tape on the edges of your stairs.    Weight Management   Why it is important to manage your weight:  Being overweight increases your risk of health conditions such as heart disease, high blood pressure, type 2 diabetes, and certain types of cancer. It can also increase your risk for osteoarthritis, sleep apnea, and other respiratory problems. Aim for a slow, steady weight loss. Even a small amount of weight loss can lower your risk of health problems.  How to lose weight safely:  A safe and healthy way to lose weight is to eat fewer calories and get regular exercise. You can lose up about 1 pound a week by decreasing the number of calories you eat by 500 calories each day.   Healthy meal plan for weight management:  A healthy meal plan includes a variety of foods, contains fewer calories, and helps you stay healthy. A healthy meal plan includes the following:  Eat whole-grain foods more often.  A healthy meal plan should contain fiber. Fiber is the part of grains, fruits, and vegetables that is not broken down by your body. Whole-grain foods are healthy and provide extra fiber in your diet. Some examples of whole-grain foods are whole-wheat breads and pastas, oatmeal, brown rice, and bulgur.  Eat a variety of vegetables every day.  Include dark, leafy greens such as spinach, kale, marcie greens, and mustard greens. Eat yellow and orange vegetables  such as carrots, sweet potatoes, and winter squash.   Eat a variety of fruits every day.  Choose fresh or canned fruit (canned in its own juice or light syrup) instead of juice. Fruit juice has very little or no fiber.  Eat low-fat dairy foods.  Drink fat-free (skim) milk or 1% milk. Eat fat-free yogurt and low-fat cottage cheese. Try low-fat cheeses such as mozzarella and other reduced-fat cheeses.  Choose meat and other protein foods that are low in fat.  Choose beans or other legumes such as split peas or lentils. Choose fish, skinless poultry (chicken or turkey), or lean cuts of red meat (beef or pork). Before you cook meat or poultry, cut off any visible fat.   Use less fat and oil.  Try baking foods instead of frying them. Add less fat, such as margarine, sour cream, regular salad dressing and mayonnaise to foods. Eat fewer high-fat foods. Some examples of high-fat foods include french fries, doughnuts, ice cream, and cakes.  Eat fewer sweets.  Limit foods and drinks that are high in sugar. This includes candy, cookies, regular soda, and sweetened drinks.  Exercise:  Exercise at least 30 minutes per day on most days of the week. Some examples of exercise include walking, biking, dancing, and swimming. You can also fit in more physical activity by taking the stairs instead of the elevator or parking farther away from stores. Ask your healthcare provider about the best exercise plan for you.      © Copyright [employer] 2018 Information is for End User's use only and may not be sold, redistributed or otherwise used for commercial purposes. All illustrations and images included in CareNotes® are the copyrighted property of [employer] Inc[employer] or [employer]

## 2024-02-27 RX ORDER — METOPROLOL SUCCINATE 25 MG/1
25 TABLET, EXTENDED RELEASE ORAL DAILY
Qty: 90 TABLET | Refills: 1 | Status: SHIPPED | OUTPATIENT
Start: 2024-02-27

## 2024-03-04 ENCOUNTER — HOSPITAL ENCOUNTER (OUTPATIENT)
Dept: NON INVASIVE DIAGNOSTICS | Facility: CLINIC | Age: 84
Discharge: HOME | End: 2024-03-04
Payer: MEDICARE

## 2024-03-04 DIAGNOSIS — I70.213 ATHEROSCLEROSIS OF NATIVE ARTERY OF BOTH LOWER EXTREMITIES WITH INTERMITTENT CLAUDICATION (HCC): ICD-10-CM

## 2024-03-04 PROCEDURE — 93923 UPR/LXTR ART STDY 3+ LVLS: CPT

## 2024-03-04 PROCEDURE — 93922 UPR/L XTREMITY ART 2 LEVELS: CPT | Performed by: SURGERY

## 2024-03-04 PROCEDURE — 93925 LOWER EXTREMITY STUDY: CPT

## 2024-03-04 PROCEDURE — 93925 LOWER EXTREMITY STUDY: CPT | Performed by: SURGERY

## 2024-04-07 DIAGNOSIS — E78.00 HYPERCHOLESTEREMIA: ICD-10-CM

## 2024-04-08 RX ORDER — SIMVASTATIN 20 MG
TABLET ORAL
Qty: 30 TABLET | Refills: 0 | Status: SHIPPED | OUTPATIENT
Start: 2024-04-08

## 2024-04-25 DIAGNOSIS — E78.00 HYPERCHOLESTEREMIA: ICD-10-CM

## 2024-04-26 RX ORDER — SIMVASTATIN 20 MG
TABLET ORAL
Qty: 90 TABLET | Refills: 1 | Status: SHIPPED | OUTPATIENT
Start: 2024-04-26

## 2024-06-17 ENCOUNTER — OFFICE VISIT (OUTPATIENT)
Dept: FAMILY MEDICINE CLINIC | Facility: CLINIC | Age: 84
End: 2024-06-17
Payer: MEDICARE

## 2024-06-17 VITALS
SYSTOLIC BLOOD PRESSURE: 142 MMHG | HEART RATE: 70 BPM | DIASTOLIC BLOOD PRESSURE: 78 MMHG | TEMPERATURE: 97.2 F | WEIGHT: 171.8 LBS | BODY MASS INDEX: 28.62 KG/M2 | OXYGEN SATURATION: 98 % | HEIGHT: 65 IN

## 2024-06-17 DIAGNOSIS — I10 BENIGN ESSENTIAL HYPERTENSION: ICD-10-CM

## 2024-06-17 DIAGNOSIS — E78.00 HYPERCHOLESTEREMIA: ICD-10-CM

## 2024-06-17 PROCEDURE — 99213 OFFICE O/P EST LOW 20 MIN: CPT | Performed by: FAMILY MEDICINE

## 2024-06-17 PROCEDURE — G2211 COMPLEX E/M VISIT ADD ON: HCPCS | Performed by: FAMILY MEDICINE

## 2024-06-17 RX ORDER — METOPROLOL SUCCINATE 25 MG/1
25 TABLET, EXTENDED RELEASE ORAL DAILY
Qty: 90 TABLET | Refills: 1 | Status: SHIPPED | OUTPATIENT
Start: 2024-06-17

## 2024-06-17 RX ORDER — ACETAMINOPHEN 500 MG
1000 TABLET ORAL EVERY 6 HOURS PRN
COMMUNITY

## 2024-06-17 RX ORDER — SIMVASTATIN 20 MG
TABLET ORAL
Qty: 90 TABLET | Refills: 1 | Status: SHIPPED | OUTPATIENT
Start: 2024-06-17

## 2024-06-17 RX ORDER — AMLODIPINE BESYLATE 2.5 MG/1
2.5 TABLET ORAL DAILY
Qty: 90 TABLET | Refills: 1 | Status: SHIPPED | OUTPATIENT
Start: 2024-06-17

## 2024-06-17 NOTE — PROGRESS NOTES
Ambulatory Visit  Name: Raza De La Rosa      : 1940      MRN: [id number]  Encounter Provider: Segundo Choi DO  Encounter Date: 2024   Encounter department: [facility]    Chief Complaint   Patient presents with    Follow-up    Hypertension       Assessment & Plan           1. Benign essential hypertension  -     amLODIPine (NORVASC) 2.5 mg tablet; Take 1 tablet (2.5 mg total) by mouth daily  -     metoprolol succinate (Toprol XL) 25 mg 24 hr tablet; Take 1 tablet (25 mg total) by mouth daily  -     Basic metabolic panel; Future; Expected date: 2024  -     CBC and Platelet; Future  -     Hepatic function panel; Future  2. Hypercholesteremia  -     simvastatin (ZOCOR) 20 mg tablet; TAKE 1 TABLET BY MOUTH EVERY DAY  -     Lipid panel; Future         History of Present Illness     BP, refill, labs.  Pressure jumps up in [facility] office.      Review of Systems   Constitutional: Negative.    HENT: Negative.     Eyes: Negative.    Respiratory: Negative.     Cardiovascular: Negative.    Gastrointestinal: Negative.    Genitourinary: Negative.    Musculoskeletal: Negative.    Skin: Negative.    Neurological: Negative.    Psychiatric/Behavioral: Negative.       Past Medical History:   Diagnosis Date    Basal cell carcinoma, ear, unspecified laterality     LAST ASSESSED: 2015    Hyperlipidemia     Hypertension     Inguinal hernia     RESOLVED: [id number]    Malignant neoplasm of prostate (HCC)     RESOLVED: 2015    Prostate enlargement     RESOLVED: 2015    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, left     LAST ASSESSED: 2015     Past Surgical History:   Procedure Laterality Date    COLONOSCOPY      DENTAL SURGERY      HERNIA REPAIR      IR AORTAGRAM WITH RUN-OFF  2019    IR AORTAGRAM WITH RUN-OFF  2020    IR LOWER EXTREMITY ANGIOGRAM  10/13/2023    MA ENDOVEN ABLTJ INCMPTNT VEIN XTR LASER 1ST VEIN Right 2020    Procedure: ENDOVASCULAR LASER THERAPY (EVLT);   Surgeon: El Townsend MD;  Location: [facility];  Service: Vascular    AK RPR 1ST INGUN HRNA AGE 5 YRS/> REDUCIBLE Right 12/07/2016    Procedure: REPAIR HERNIA INGUINAL; HYDROCELECTOMY;  Surgeon: Jason Moise MD;  Location: [facility];  Service: General    AK SLCTV CATHJ 3RD+ ORD SLCTV ABDL PEL/LXTR [facility] Right 11/16/2020    Procedure: ARTERIOGRAM, DIAGNOSTIC AORTO ILIAC, RIGHT LOWER EXTREMITY  ANGIOGRAM, ANGIOPLASTY AND STENTING OF SFA/POPLITEAL, ANGIOPLASTY OF ANTERIOTIBIAL/POPLITEAL AND ATHERECTOMY, ANGIOPLASTY OF TIBIOPERINEAL TRUNK, MYNX CLOSURE;  Surgeon: El Townsend MD;  Location: [facility];  Service: Vascular    AK SLCTV CATHJ 3RD+ ORD SLCTV ABDL PEL/LXTR [facility] N/A 10/13/2023    Procedure: right lower extremity arteriogram;  Surgeon: Ralph Bran MD;  Location: [facility];  Service: Vascular    PROSTATE SURGERY  2014    PROSTATECTOMY RADICAL; LAST ASSESSED: 27OCT2015    SKIN CANCER EXCISION      TRANSURETHRAL RESECTION OF PROSTATE  2011    VASCULAR SURGERY       Family History   Problem Relation Age of Onset    Hypertension Mother     Cancer Father     Kidney failure Family      Social History     Tobacco Use    Smoking status: Never    Smokeless tobacco: Never   Vaping Use    Vaping status: Never Used   Substance and Sexual Activity    Alcohol use: Not Currently    Drug use: Never    Sexual activity: Not on file     Current Outpatient Medications on File Prior to Visit   Medication Sig    acetaminophen (TYLENOL) 500 mg tablet Take 1,000 mg by mouth every 6 (six) hours as needed for mild pain    clopidogrel (Plavix) 75 mg tablet Take 1 tablet (75 mg total) by mouth daily    dorzolamide-timolol (COSOPT) 22.3-6.8 MG/ML ophthalmic solution Administer to both eyes 2 (two) times a day    latanoprost (XALATAN) 0.005 % ophthalmic solution Administer to both eyes daily at bedtime    multivitamin (THERAGRAN) TABS Take 1 tablet by mouth daily    [DISCONTINUED] amLODIPine (NORVASC) 2.5 mg tablet  "TAKE 1 TABLET (2.5 MG TOTAL) BY MOUTH EVERY DAY AT BEDTIME    [DISCONTINUED] metoprolol succinate (Toprol XL) 25 mg 24 hr tablet Take 1 tablet (25 mg total) by mouth daily    [DISCONTINUED] simvastatin (ZOCOR) 20 mg tablet TAKE 1 TABLET BY MOUTH EVERY DAY    aspirin (ECOTRIN LOW STRENGTH) 81 mg EC tablet opne po 2X a week, eg Monday and Friday. (Patient not taking: Reported on 6/17/2024)     Allergies   Allergen Reactions    Other Itching and Rash     SESAME SEEDS,SOB     Immunization History   Administered Date(s) Administered    COVID-19 PFIZER VACCINE 0.3 ML IM 01/21/2021, 02/11/2021, 10/08/2021    COVID-19 Pfizer Vac BIVALENT Trevor-sucrose 12 Yr+ IM 10/08/2022    COVID-19 Pfizer mRNA vacc PF trevor-sucrose 12 yr and older (Comirnaty) 09/25/2023    COVID-19 Pfizer vac (Trevor-sucrose, gray cap) 12 yr+ IM 04/06/2022    H1N1, All Formulations 01/08/2010    INFLUENZA 09/23/2015, 10/05/2016, 10/09/2017, 10/15/2018, 09/25/2023    Influenza Split High Dose Preservative Free IM 09/23/2015, 10/05/2016    Influenza, seasonal, injectable 10/09/2017    Pneumococcal Conjugate 13-Valent 10/09/2017    Pneumococcal Polysaccharide PPV23 12/19/2018    Tdap 12/19/2018, 11/20/2023    influenza, trivalent, adjuvanted 10/08/2019     Objective     /78 (BP Location: Left arm, Patient Position: Sitting, Cuff Size: Standard)   Pulse 70   Temp (!) 97.2 °F (36.2 °C) (Temporal)   Ht 5' 5\" (1.651 m)   Wt 77.9 kg (171 lb 12.8 oz)   SpO2 98%   BMI 28.59 kg/m²     Physical Exam  Constitutional:       Appearance: He is well-developed.   HENT:      Head: Normocephalic and atraumatic.      Right Ear: External ear normal.      Left Ear: External ear normal.      Nose: Nose normal.      Mouth/Throat:      Mouth: Mucous membranes are moist.      Pharynx: Oropharynx is clear.   Eyes:      Conjunctiva/sclera: Conjunctivae normal.      Pupils: Pupils are equal, round, and reactive to light.   Cardiovascular:      Rate and Rhythm: Normal rate and " regular rhythm.      Heart sounds: Normal heart sounds.   Pulmonary:      Effort: Pulmonary effort is normal.      Breath sounds: Normal breath sounds.   Musculoskeletal:      Cervical back: Normal range of motion and neck supple.   Skin:     General: Skin is warm and dry.   Neurological:      Mental Status: He is alert and oriented to person, place, and time.      Deep Tendon Reflexes: Reflexes are normal and symmetric.   Psychiatric:         Behavior: Behavior normal.         Thought Content: Thought content normal.         Judgment: Judgment normal.       Administrative Statements

## 2024-08-19 ENCOUNTER — TELEMEDICINE (OUTPATIENT)
Dept: FAMILY MEDICINE CLINIC | Facility: CLINIC | Age: 84
End: 2024-08-19
Payer: MEDICARE

## 2024-08-19 DIAGNOSIS — U07.1 COVID-19 VIRUS INFECTION: Primary | ICD-10-CM

## 2024-08-19 DIAGNOSIS — R09.81 HEAD CONGESTION: ICD-10-CM

## 2024-08-19 PROCEDURE — 99213 OFFICE O/P EST LOW 20 MIN: CPT | Performed by: FAMILY MEDICINE

## 2024-08-19 PROCEDURE — G2211 COMPLEX E/M VISIT ADD ON: HCPCS | Performed by: FAMILY MEDICINE

## 2024-08-19 RX ORDER — METHYLPREDNISOLONE 4 MG
TABLET, DOSE PACK ORAL
Qty: 21 EACH | Refills: 0 | Status: SHIPPED | OUTPATIENT
Start: 2024-08-19

## 2024-08-19 RX ORDER — AZITHROMYCIN 250 MG/1
TABLET, FILM COATED ORAL
Qty: 6 TABLET | Refills: 0 | Status: SHIPPED | OUTPATIENT
Start: 2024-08-19 | End: 2024-08-24

## 2024-08-19 NOTE — PROGRESS NOTES
Virtual Regular Visit  Name: Raza De La Rosa      : 1940      MRN: [id number]  Encounter Provider: Segundo Choi DO  Encounter Date: 2024   Encounter department: [facility]    Verification of patient location:    Patient is located at Home in the following state in which I hold an active license [place]    Assessment & Plan   1. COVID-19 virus infection  -     methylPREDNISolone 4 MG tablet therapy pack; Use as directed on package  -     azithromycin (Zithromax) 250 mg tablet; Take 2 tablets (500 mg total) by mouth daily for 1 day, THEN 1 tablet (250 mg total) daily for 4 days.  2. Head congestion  -     methylPREDNISolone 4 MG tablet therapy pack; Use as directed on package  -     azithromycin (Zithromax) 250 mg tablet; Take 2 tablets (500 mg total) by mouth daily for 1 day, THEN 1 tablet (250 mg total) daily for 4 days.         Encounter provider Segundo Choi DO    The patient was identified by name and date of birth. Raza De La Rosa was informed that this is a telemedicine visit and that the visit is being conducted through the Epic Embedded platform. He agrees to proceed..  My office door was closed. No one else was in the room.  He acknowledged consent and understanding of privacy and security of the video platform. The patient has agreed to participate and understands they can discontinue the visit at any time.    Patient is aware this is a billable service.     History of Present Illness     Head cold  started last pm, + covid testing this am.  Wife just got over covid.  Symptoms of a mild viral infection.        Review of Systems   Constitutional: Negative.  Negative for fever.   HENT:  Positive for congestion.         Mild   Eyes: Negative.    Respiratory:  Positive for cough.         Mild occasional cough   Cardiovascular: Negative.    Gastrointestinal: Negative.    Genitourinary: Negative.    Musculoskeletal: Negative.    Skin: Negative.    Neurological: Negative.     Psychiatric/Behavioral: Negative.         Objective     There were no vitals taken for this visit.  Physical Exam  Neurological:      Mental Status: He is alert.   Psychiatric:         Mood and Affect: Mood normal.         Behavior: Behavior normal.         Thought Content: Thought content normal.         Judgment: Judgment normal.         Visit Time  Total Visit Duration: 10 minutes.

## 2024-09-29 DIAGNOSIS — I10 BENIGN ESSENTIAL HYPERTENSION: ICD-10-CM

## 2024-09-29 RX ORDER — METOPROLOL SUCCINATE 25 MG/1
25 TABLET, EXTENDED RELEASE ORAL DAILY
Qty: 90 TABLET | Refills: 1 | Status: SHIPPED | OUTPATIENT
Start: 2024-09-29

## 2024-10-14 DIAGNOSIS — I73.9 PERIPHERAL VASCULAR DISEASE, UNSPECIFIED (HCC): ICD-10-CM

## 2024-10-14 NOTE — TELEPHONE ENCOUNTER
Reason for call:   [x] Refill   [] Prior Auth  [] Other:     Office:   [] PCP/Provider -   [x] Specialty/Provider -     Medication:     clopidogrel (Plavix) 75 mg tablet       Dose/Frequency:  Take 1 tablet (75 mg total) by mouth daily,     Quantity: 90 tab    Pharmacy: [facility]/pharmacy #[id number] - [place], [place] - [address]     Does the patient have enough for 3 days?   [x] Yes   [] No - Send as HP to POD

## 2024-10-15 RX ORDER — CLOPIDOGREL BISULFATE 75 MG/1
75 TABLET ORAL DAILY
Qty: 90 TABLET | Refills: 0 | Status: SHIPPED | OUTPATIENT
Start: 2024-10-15

## 2024-11-01 ENCOUNTER — RA CDI HCC (OUTPATIENT)
Dept: OTHER | Facility: HOSPITAL | Age: 84
End: 2024-11-01

## 2024-11-05 ENCOUNTER — APPOINTMENT (OUTPATIENT)
Dept: LAB | Facility: CLINIC | Age: 84
End: 2024-11-05
Payer: MEDICARE

## 2024-11-05 DIAGNOSIS — I10 BENIGN ESSENTIAL HYPERTENSION: ICD-10-CM

## 2024-11-05 DIAGNOSIS — E78.00 HYPERCHOLESTEREMIA: ICD-10-CM

## 2024-11-05 LAB
ALBUMIN SERPL BCG-MCNC: 4 G/DL (ref 3.5–5)
ALP SERPL-CCNC: 72 U/L (ref 34–104)
ALT SERPL W P-5'-P-CCNC: 15 U/L (ref 7–52)
ANION GAP SERPL CALCULATED.3IONS-SCNC: 10 MMOL/L (ref 4–13)
AST SERPL W P-5'-P-CCNC: 21 U/L (ref 13–39)
BILIRUB DIRECT SERPL-MCNC: 0.08 MG/DL (ref 0–0.2)
BILIRUB SERPL-MCNC: 0.58 MG/DL (ref 0.2–1)
BUN SERPL-MCNC: 24 MG/DL (ref 5–25)
CALCIUM SERPL-MCNC: 8.8 MG/DL (ref 8.4–10.2)
CHLORIDE SERPL-SCNC: 103 MMOL/L (ref 96–108)
CHOLEST SERPL-MCNC: 162 MG/DL
CO2 SERPL-SCNC: 27 MMOL/L (ref 21–32)
CREAT SERPL-MCNC: 1.11 MG/DL (ref 0.6–1.3)
ERYTHROCYTE [DISTWIDTH] IN BLOOD BY AUTOMATED COUNT: 13.8 % (ref 11.6–15.1)
GFR SERPL CREATININE-BSD FRML MDRD: 60 ML/MIN/1.73SQ M
GLUCOSE P FAST SERPL-MCNC: 109 MG/DL (ref 65–99)
HCT VFR BLD AUTO: 47 % (ref 36.5–49.3)
HDLC SERPL-MCNC: 38 MG/DL
HGB BLD-MCNC: 15.5 G/DL (ref 12–17)
LDLC SERPL CALC-MCNC: 80 MG/DL (ref 0–100)
MCH RBC QN AUTO: 30.8 PG (ref 26.8–34.3)
MCHC RBC AUTO-ENTMCNC: 33 G/DL (ref 31.4–37.4)
MCV RBC AUTO: 93 FL (ref 82–98)
NONHDLC SERPL-MCNC: 124 MG/DL
PLATELET # BLD AUTO: 172 THOUSANDS/UL (ref 149–390)
PMV BLD AUTO: 10.4 FL (ref 8.9–12.7)
POTASSIUM SERPL-SCNC: 4.3 MMOL/L (ref 3.5–5.3)
PROT SERPL-MCNC: 6.9 G/DL (ref 6.4–8.4)
RBC # BLD AUTO: 5.04 MILLION/UL (ref 3.88–5.62)
SODIUM SERPL-SCNC: 140 MMOL/L (ref 135–147)
TRIGL SERPL-MCNC: 221 MG/DL
WBC # BLD AUTO: 11.19 THOUSAND/UL (ref 4.31–10.16)

## 2024-11-05 PROCEDURE — 80048 BASIC METABOLIC PNL TOTAL CA: CPT

## 2024-11-05 PROCEDURE — 80076 HEPATIC FUNCTION PANEL: CPT

## 2024-11-05 PROCEDURE — 80061 LIPID PANEL: CPT

## 2024-11-05 PROCEDURE — 36415 COLL VENOUS BLD VENIPUNCTURE: CPT

## 2024-11-05 PROCEDURE — 85027 COMPLETE CBC AUTOMATED: CPT

## 2024-11-06 ENCOUNTER — TELEPHONE (OUTPATIENT)
Dept: FAMILY MEDICINE CLINIC | Facility: CLINIC | Age: 84
End: 2024-11-06

## 2024-11-06 NOTE — TELEPHONE ENCOUNTER
----- Message from Segundo Choi DO sent at 11/6/2024 10:06 AM EST -----  Kidney function is good.  ----- Message -----  From: Lab, Background User  Sent: 11/5/2024  10:18 AM EST  To: Segundo Choi DO

## 2024-11-06 NOTE — TELEPHONE ENCOUNTER
Called patient spoke to patients wife (on communication consent form)  gave blood work results, she verbalized understanding and will call back if needed. Patient wife did mention that patient is scheduled for 11/11/2024.

## 2024-11-11 ENCOUNTER — OFFICE VISIT (OUTPATIENT)
Dept: FAMILY MEDICINE CLINIC | Facility: CLINIC | Age: 84
End: 2024-11-11
Payer: MEDICARE

## 2024-11-11 VITALS
TEMPERATURE: 97.3 F | DIASTOLIC BLOOD PRESSURE: 74 MMHG | BODY MASS INDEX: 28.26 KG/M2 | HEIGHT: 65 IN | OXYGEN SATURATION: 97 % | WEIGHT: 169.6 LBS | SYSTOLIC BLOOD PRESSURE: 136 MMHG | HEART RATE: 64 BPM

## 2024-11-11 DIAGNOSIS — C61 PROSTATE CANCER (HCC): ICD-10-CM

## 2024-11-11 DIAGNOSIS — I87.2 CHRONIC VENOUS INSUFFICIENCY: Primary | ICD-10-CM

## 2024-11-11 DIAGNOSIS — I10 WHITE COAT SYNDROME WITH DIAGNOSIS OF HYPERTENSION: ICD-10-CM

## 2024-11-11 DIAGNOSIS — I70.213 ATHEROSCLEROSIS OF NATIVE ARTERY OF BOTH LOWER EXTREMITIES WITH INTERMITTENT CLAUDICATION (HCC): ICD-10-CM

## 2024-11-11 DIAGNOSIS — E78.00 HYPERCHOLESTEREMIA: ICD-10-CM

## 2024-11-11 DIAGNOSIS — I10 BENIGN ESSENTIAL HYPERTENSION: ICD-10-CM

## 2024-11-11 PROCEDURE — G2211 COMPLEX E/M VISIT ADD ON: HCPCS | Performed by: FAMILY MEDICINE

## 2024-11-11 PROCEDURE — 99214 OFFICE O/P EST MOD 30 MIN: CPT | Performed by: FAMILY MEDICINE

## 2024-11-11 RX ORDER — METOPROLOL SUCCINATE 25 MG/1
25 TABLET, EXTENDED RELEASE ORAL DAILY
Qty: 100 TABLET | Refills: 1 | Status: SHIPPED | OUTPATIENT
Start: 2024-11-11

## 2024-11-11 RX ORDER — SIMVASTATIN 20 MG
TABLET ORAL
Qty: 100 TABLET | Refills: 1 | Status: SHIPPED | OUTPATIENT
Start: 2024-11-11

## 2024-11-11 RX ORDER — AMLODIPINE BESYLATE 2.5 MG/1
2.5 TABLET ORAL DAILY
Qty: 100 TABLET | Refills: 1 | Status: SHIPPED | OUTPATIENT
Start: 2024-11-11

## 2024-11-11 NOTE — PROGRESS NOTES
"Ambulatory Visit  Name: Raza De La Rosa      : 1940      MRN: [id number]  Encounter Provider: Segundo Choi DO  Encounter Date: 2024   Encounter department: [facility]    Chief Complaint   Patient presents with    Follow-up    Hypertension       Assessment & Plan  Benign essential hypertension    Orders:    amLODIPine (NORVASC) 2.5 mg tablet; Take 1 tablet (2.5 mg total) by mouth daily    metoprolol succinate (TOPROL-XL) 25 mg 24 hr tablet; Take 1 tablet (25 mg total) by mouth daily    Hypercholesteremia    Orders:    simvastatin (ZOCOR) 20 mg tablet; TAKE 1 TABLET BY MOUTH EVERY DAY    Chronic venous insufficiency         White coat syndrome with diagnosis of hypertension         Prostate cancer (HCC)         Atherosclerosis of native artery of both lower extremities with intermittent claudication (HCC)           Falls Plan of Care: balance, strength, and gait training instructions were provided.       History of Present Illness     Review labs, refill.  SH: Enjoying alf.  Denies leg cramps, using compression socks.  BP controlled.  No problem with urination.  Ckhart reviewed prior to visit.          Review of Systems   Constitutional: Negative.    HENT: Negative.     Eyes: Negative.    Respiratory: Negative.     Cardiovascular: Negative.    Gastrointestinal: Negative.    Genitourinary: Negative.    Musculoskeletal: Negative.    Skin: Negative.    Neurological: Negative.    Psychiatric/Behavioral: Negative.             Objective     /74 (BP Location: Left arm, Patient Position: Sitting, Cuff Size: Standard)   Pulse 64   Temp (!) 97.3 °F (36.3 °C) (Temporal)   Ht 5' 5\" (1.651 m)   Wt 76.9 kg (169 lb 9.6 oz)   SpO2 97%   BMI 28.22 kg/m²     Physical Exam  Constitutional:       Appearance: He is well-developed.   HENT:      Head: Normocephalic and atraumatic.      Right Ear: External ear normal.      Left Ear: External ear normal.      Nose: Nose normal.   Eyes:      " Conjunctiva/sclera: Conjunctivae normal.      Pupils: Pupils are equal, round, and reactive to light.   Cardiovascular:      Rate and Rhythm: Normal rate and regular rhythm.      Heart sounds: Normal heart sounds.   Pulmonary:      Effort: Pulmonary effort is normal.      Breath sounds: Normal breath sounds.   Abdominal:      General: Bowel sounds are normal.      Palpations: Abdomen is soft.   Musculoskeletal:      Cervical back: Normal range of motion and neck supple.   Skin:     General: Skin is warm and dry.   Neurological:      General: No focal deficit present.      Mental Status: He is alert and oriented to person, place, and time.      Deep Tendon Reflexes: Reflexes are normal and symmetric.   Psychiatric:         Mood and Affect: Mood normal.         Behavior: Behavior normal.         Thought Content: Thought content normal.         Judgment: Judgment normal.

## 2024-11-11 NOTE — ASSESSMENT & PLAN NOTE
Orders:    amLODIPine (NORVASC) 2.5 mg tablet; Take 1 tablet (2.5 mg total) by mouth daily    metoprolol succinate (TOPROL-XL) 25 mg 24 hr tablet; Take 1 tablet (25 mg total) by mouth daily

## 2024-12-19 ENCOUNTER — OFFICE VISIT (OUTPATIENT)
Dept: VASCULAR SURGERY | Facility: CLINIC | Age: 84
End: 2024-12-19
Payer: MEDICARE

## 2024-12-19 VITALS
SYSTOLIC BLOOD PRESSURE: 110 MMHG | HEART RATE: 72 BPM | WEIGHT: 170.6 LBS | BODY MASS INDEX: 28.42 KG/M2 | DIASTOLIC BLOOD PRESSURE: 70 MMHG | OXYGEN SATURATION: 99 % | HEIGHT: 65 IN

## 2024-12-19 DIAGNOSIS — E78.00 HYPERCHOLESTEROLEMIA: ICD-10-CM

## 2024-12-19 DIAGNOSIS — I10 BENIGN ESSENTIAL HYPERTENSION: ICD-10-CM

## 2024-12-19 DIAGNOSIS — I70.213 ATHEROSCLEROSIS OF NATIVE ARTERY OF BOTH LOWER EXTREMITIES WITH INTERMITTENT CLAUDICATION (HCC): Primary | ICD-10-CM

## 2024-12-19 PROCEDURE — 99214 OFFICE O/P EST MOD 30 MIN: CPT

## 2024-12-19 NOTE — ASSESSMENT & PLAN NOTE
Patient reports that he has been doing well since his last office visit.  He is denying any activity change, rest pain, or tissue loss.  Patient does report continued claudication to his right calf after ambulating approximately 2 blocks.  This stiffness typically resolves after resting for 1 minute.  Patient reports that he has been utilizing a walking stick for ambulation.  He reports that he has been walking almost every day that the weather permits.  He remains active and independent with ADLs and is able to carry out his daily errands without difficulty.    Imaging:  LEAD 3/4/2024:  RIGHT LOWER LIMB:  This resting evaluation shows a 50-75% stenosis in the CFA, PFA, and TPT.  There is a >75% stenosis in the mid portion of the mid/distal SFA/stent.  There is known occlusion of the posterior tibial artery; collateral flow is suggested.  Ankle/Brachial index: 0.83/139/72  LEFT LOWER LIMB:  This resting evaluation shows a known occlusion of the proximal to distal  SFA with reconstitution at the distal SFAl/above knee popliteal artery.  Ankle/Brachial index: NC/81/65    Plan:  -We discussed the pathophysiology of peripheral arterial disease as well as contributing lifestyle factors.  -We discussed the results of LEAD at length.  There has been improvement in the bilateral JANICE and GTP.  -Recommend continued  daily walking to promote additional collateral circulation.  -Continue medical optimization with simvastatin and clopidogrel  -Will repeat LEAD in 3 months to maintain every 12-month surveillance.  -Instructed patient to notify office of any worsening claudication, rest pain, or tissue loss  -Instructed patient to report to the ED for any symptoms of acute limb ischemia (color/temperature change, motor/sensory loss, tissue loss).  -Follow up in the office after repeat LEAD    Orders:    VAS ARTERIAL DUPLEX- LOWER LIMB BILATERAL; Future

## 2024-12-19 NOTE — ASSESSMENT & PLAN NOTE
Well-controlled in the office today, 110/70    -Low-salt diet  -Continue medical management per PCP

## 2024-12-19 NOTE — PROGRESS NOTES
Name: Raza De La Rosa      : 1940      MRN: [id number]  Encounter Provider: SERAFIN Merchant  Encounter Date: 2024   Enc[facility]: [facility] [place]  :  Assessment & Plan  Atherosclerosis of native artery of both lower extremities with intermittent claudication (HCC)  Patient reports that he has been doing well since his last office visit.  He is denying any activity change, rest pain, or tissue loss.  Patient does report continued claudication to his right calf after ambulating approximately 2 blocks.  This stiffness typically resolves after resting for 1 minute.  Patient reports that he has been utilizing a walking stick for ambulation.  He reports that he has been walking almost every day that the weather permits.  He remains active and independent with ADLs and is able to carry out his daily errands without difficulty.    Imaging:  LEAD 3/4/2024:  RIGHT LOWER LIMB:  This resting evaluation shows a 50-75% stenosis in the CFA, PFA, and TPT.  There is a >75% stenosis in the mid portion of the mid/distal SFA/stent.  There is known occlusion of the posterior tibial artery; collateral flow is suggested.  Ankle/Brachial index: 0.83/139/72  LEFT LOWER LIMB:  This resting evaluation shows a known occlusion of the proximal to distal  SFA with reconstitution at the distal SFAl/above knee popliteal artery.  Ankle/Brachial index: NC/81/65    Plan:  -We discussed the pathophysiology of peripheral arterial disease as well as contributing lifestyle factors.  -We discussed the results of LEAD at length.  There has been improvement in the bilateral JANICE and GTP.  -Recommend continued  daily walking to promote additional collateral circulation.  -Continue medical optimization with simvastatin and clopidogrel  -Will repeat LEAD in 3 months to maintain every 12-month surveillance.  -Instructed patient to notify office of any worsening claudication, rest pain, or tissue loss  -Instructed  patient to report to the ED for any symptoms of acute limb ischemia (color/temperature change, motor/sensory loss, tissue loss).  -Follow up in the office after repeat LEAD    Orders:    VAS ARTERIAL DUPLEX- LOWER LIMB BILATERAL; Future    Benign essential hypertension  Well-controlled in the office today, 110/70    -Low-salt diet  -Continue medical management per PCP         Hypercholesterolemia  Continue simvastatin.         History of Present Illness     Raza De La Rosa is a 84 y.o. male, non-smoker, with PMH HTN, HLD, prostate cancer, CKD 3, asymptomatic carotid artery stenosis, chronic venous insufficiency, and PAD s/p multiple right lower extremity endovascular interventions.  Patient is presenting to the vascular surgery office to review results of LEAD 3/4/2024.    Patient reports that he has been doing well since his last office visit.  He is denying any activity change, rest pain, or tissue loss.  Patient does report continued claudication to his right calf after ambulating approximately 2 blocks.  This stiffness typically resolves after resting for 1 minute.  Patient reports that he has been utilizing a walking stick for ambulation.  He reports that he has been walking almost every day that the weather permits.  He remains active and independent with ADLs and is able to carry out his daily errands without difficulty.    History obtained from: patient and patient's Significant Other    Review of Systems   Constitutional:  Negative for activity change.   HENT: Negative.     Eyes: Negative.    Respiratory: Negative.  Negative for shortness of breath.    Cardiovascular:  Negative for chest pain and leg swelling.   Gastrointestinal: Negative.    Endocrine: Negative.    Genitourinary: Negative.    Musculoskeletal: Negative.    Skin:  Negative for pallor and wound.   Allergic/Immunologic: Negative.    Neurological: Negative.    Hematological: Negative.    Psychiatric/Behavioral: Negative.       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  Current Outpatient Medications on File Prior to Visit   Medication Sig Dispense Refill    acetaminophen (TYLENOL) 500 mg tablet Take 1,000 mg by mouth every 6 (six) hours as needed for mild pain      amLODIPine (NORVASC) 2.5 mg tablet Take 1 tablet (2.5 mg total) by mouth daily 100 tablet 1    clopidogrel (Plavix) 75 mg tablet Take 1 tablet (75 mg total) by mouth daily 90 tablet 0    dorzolamide-timolol (COSOPT) 22.3-6.8 MG/ML ophthalmic solution Administer to both eyes 2 (two) times a day      latanoprost (XALATAN) 0.005 % ophthalmic solution Administer to both eyes daily at bedtime      metoprolol succinate (TOPROL-XL) 25 mg 24 hr tablet Take 1 tablet (25 mg total) by mouth daily 100 tablet 1    simvastatin (ZOCOR) 20 mg tablet TAKE 1 TABLET BY MOUTH EVERY  tablet 1    multivitamin (THERAGRAN) TABS Take 1 tablet by mouth daily (Patient not taking: Reported on 12/19/2024)       No current facility-administered medications on file prior to visit.      Social History     Tobacco Use    Smoking status: Never    Smokeless tobacco: Never   Vaping Use    Vaping status: Never Used   Substance and Sexual Activity    Alcohol use: Not Currently    Drug use: Never    Sexual activity: Not on file        Objective   /70 (BP Location: Right arm, Patient Position: Sitting, Cuff Size: Standard)   Pulse 72   Ht 5' 5\" (1.651 m)   Wt 77.4 kg (170 lb 9.6 oz)   SpO2 99%   BMI 28.39 kg/m²      Physical Exam  Vitals and nursing note reviewed.   Constitutional:  "      General: He is not in acute distress.     Appearance: Normal appearance. He is well-developed.   HENT:      Head: Normocephalic and atraumatic.   Eyes:      Conjunctiva/sclera: Conjunctivae normal.   Cardiovascular:      Rate and Rhythm: Normal rate and regular rhythm.      Pulses:           Dorsalis pedis pulses are detected w/ Doppler on the right side and detected w/ Doppler on the left side.        Posterior tibial pulses are 0 on the right side and detected w/ Doppler on the left side.      Heart sounds: Normal heart sounds. No murmur heard.  Pulmonary:      Effort: Pulmonary effort is normal. No respiratory distress.      Breath sounds: Normal breath sounds.   Abdominal:      Palpations: Abdomen is soft.      Tenderness: There is no abdominal tenderness.   Musculoskeletal:         General: No swelling or tenderness.      Cervical back: Normal range of motion and neck supple.      Right lower le+ Edema present.      Left lower le+ Edema present.   Skin:     General: Skin is warm and dry.      Capillary Refill: Capillary refill takes less than 2 seconds.      Findings: No lesion, rash or wound.   Neurological:      General: No focal deficit present.      Mental Status: He is alert and oriented to person, place, and time.   Psychiatric:         Mood and Affect: Mood normal.         Behavior: Behavior normal.         Administrative Statements   I have spent a total time of 30 minutes in caring for this patient on the day of the visit/encounter including Diagnostic results, Prognosis, Risks and benefits of tx options, Instructions for management, Patient and family education, Importance of tx compliance, Risk factor reductions, Impressions, Counseling / Coordination of care, Documenting in the medical record, Reviewing / ordering tests, medicine, procedures  , and Obtaining or reviewing history  .

## 2025-01-21 ENCOUNTER — OFFICE VISIT (OUTPATIENT)
Dept: FAMILY MEDICINE CLINIC | Facility: CLINIC | Age: 85
End: 2025-01-21
Payer: MEDICARE

## 2025-01-21 VITALS
SYSTOLIC BLOOD PRESSURE: 138 MMHG | HEART RATE: 102 BPM | DIASTOLIC BLOOD PRESSURE: 72 MMHG | OXYGEN SATURATION: 95 % | WEIGHT: 172.6 LBS | BODY MASS INDEX: 28.76 KG/M2 | TEMPERATURE: 97.6 F | HEIGHT: 65 IN

## 2025-01-21 DIAGNOSIS — C61 PROSTATE CANCER (HCC): ICD-10-CM

## 2025-01-21 DIAGNOSIS — N18.31 STAGE 3A CHRONIC KIDNEY DISEASE (HCC): ICD-10-CM

## 2025-01-21 DIAGNOSIS — R09.81 HEAD CONGESTION: ICD-10-CM

## 2025-01-21 DIAGNOSIS — R05.1 ACUTE COUGH: ICD-10-CM

## 2025-01-21 DIAGNOSIS — H10.33 ACUTE CONJUNCTIVITIS OF BOTH EYES, UNSPECIFIED ACUTE CONJUNCTIVITIS TYPE: Primary | ICD-10-CM

## 2025-01-21 DIAGNOSIS — I70.213 ATHEROSCLEROSIS OF NATIVE ARTERY OF BOTH LOWER EXTREMITIES WITH INTERMITTENT CLAUDICATION (HCC): ICD-10-CM

## 2025-01-21 DIAGNOSIS — H10.33 ACUTE CONJUNCTIVITIS OF BOTH EYES, UNSPECIFIED ACUTE CONJUNCTIVITIS TYPE: ICD-10-CM

## 2025-01-21 PROCEDURE — 99213 OFFICE O/P EST LOW 20 MIN: CPT | Performed by: FAMILY MEDICINE

## 2025-01-21 PROCEDURE — G2211 COMPLEX E/M VISIT ADD ON: HCPCS | Performed by: FAMILY MEDICINE

## 2025-01-21 RX ORDER — NEOMYCIN/POLYMYXIN B/HYDROCORT 3.5-10K-1
1 SUSPENSION, DROPS(FINAL DOSAGE FORM)(ML) OPHTHALMIC (EYE) 3 TIMES DAILY
Qty: 7.5 ML | Refills: 0 | Status: SHIPPED | OUTPATIENT
Start: 2025-01-21 | End: 2025-01-22 | Stop reason: RX

## 2025-01-21 RX ORDER — METHYLPREDNISOLONE 4 MG/1
TABLET ORAL
Qty: 21 EACH | Refills: 0 | Status: SHIPPED | OUTPATIENT
Start: 2025-01-21

## 2025-01-21 RX ORDER — AZITHROMYCIN 250 MG/1
TABLET, FILM COATED ORAL
Qty: 6 TABLET | Refills: 0 | Status: SHIPPED | OUTPATIENT
Start: 2025-01-21 | End: 2025-01-26

## 2025-01-21 NOTE — ASSESSMENT & PLAN NOTE
Lab Results   Component Value Date    EGFR 60 11/05/2024    EGFR 69 11/21/2023    EGFR 55 10/05/2023    CREATININE 1.11 11/05/2024    CREATININE 0.99 11/21/2023    CREATININE 1.19 10/05/2023

## 2025-01-21 NOTE — PROGRESS NOTES
Name: Raza De La Rosa      : 1940      MRN: [id number]  Encounter Provider: Segundo Choi DO  Encounter Date: 2025   Encounter department: [facility]  :  Chief Complaint   Patient presents with    Eye Problem     Eye lid redness and swelling and right eye ball pinkness     Eye Drainage    Nasal Congestion    Cough     Productive for 4 days      BMI Counseling: Body mass index is 28.72 kg/m². The BMI is above normal. Nutrition recommendations include reducing portion sizes, consuming healthier snacks, and moderation in carbohydrate intake.  Assessment & Plan  Acute conjunctivitis of both eyes, unspecified acute conjunctivitis type    Orders:    neomycin-polymyxin-hydrocortisone (CORTISPORIN) 0.35%-10,000 units/mL-1% ophthalmic suspension; Administer 1 drop to both eyes 3 (three) times a day Treat eyes for 2 - 3 days.    Head congestion    Orders:    azithromycin (Zithromax) 250 mg tablet; Take 2 tablets (500 mg total) by mouth daily for 1 day, THEN 1 tablet (250 mg total) daily for 4 days.    methylPREDNISolone 4 MG tablet therapy pack; Use as directed on package    Acute cough    Orders:    azithromycin (Zithromax) 250 mg tablet; Take 2 tablets (500 mg total) by mouth daily for 1 day, THEN 1 tablet (250 mg total) daily for 4 days.    methylPREDNISolone 4 MG tablet therapy pack; Use as directed on package    Atherosclerosis of native artery of both lower extremities with intermittent claudication (HCC)         Prostate cancer (HCC)         Stage 3a chronic kidney disease (HCC)  Lab Results   Component Value Date    EGFR 60 2024    EGFR 69 2023    EGFR 55 10/05/2023    CREATININE 1.11 2024    CREATININE 0.99 2023    CREATININE 1.19 10/05/2023                   History of Present Illness   Eye drainage started yesterday, today both eyes with a white DC, mild conjunctival injection.  Friday or Saturday started with a head and chest cold.  Cough with light yellow  "production.      Review of Systems   Constitutional: Negative.    HENT:  Positive for congestion.    Eyes:  Positive for discharge, redness and itching.        DC is white - creamy color.     Respiratory:  Positive for cough.    Gastrointestinal: Negative.    Genitourinary: Negative.    Musculoskeletal: Negative.    Neurological: Negative.    Psychiatric/Behavioral: Negative.         Objective   /72 (BP Location: Left arm, Patient Position: Sitting, Cuff Size: Standard)   Pulse 102   Temp 97.6 °F (36.4 °C) (Temporal)   Ht 5' 5\" (1.651 m)   Wt 78.3 kg (172 lb 9.6 oz)   SpO2 95%   BMI 28.72 kg/m²      Physical Exam  Constitutional:       Appearance: He is well-developed.   HENT:      Head: Normocephalic and atraumatic.      Right Ear: Tympanic membrane, ear canal and external ear normal.      Left Ear: Tympanic membrane, ear canal and external ear normal.      Nose: Nose normal.      Mouth/Throat:      Mouth: Mucous membranes are moist.      Pharynx: Oropharynx is clear.   Eyes:      General: No scleral icterus.        Right eye: Discharge present.         Left eye: Discharge present.     Extraocular Movements: Extraocular movements intact.      Pupils: Pupils are equal, round, and reactive to light.      Comments: Cream color DC, mild conj irritation.  Mild puffy eye lids.   Cardiovascular:      Rate and Rhythm: Normal rate and regular rhythm.      Heart sounds: Normal heart sounds.   Pulmonary:      Effort: Pulmonary effort is normal.      Breath sounds: Normal breath sounds.   Abdominal:      General: Bowel sounds are normal.      Palpations: Abdomen is soft.   Musculoskeletal:      Cervical back: Normal range of motion and neck supple.   Skin:     General: Skin is warm and dry.   Neurological:      Mental Status: He is alert and oriented to person, place, and time.      Deep Tendon Reflexes: Reflexes are normal and symmetric.   Psychiatric:         Behavior: Behavior normal.         Thought Content: " Thought content normal.         Judgment: Judgment normal.

## 2025-01-22 DIAGNOSIS — H10.33 ACUTE CONJUNCTIVITIS OF BOTH EYES, UNSPECIFIED ACUTE CONJUNCTIVITIS TYPE: Primary | ICD-10-CM

## 2025-01-22 RX ORDER — CIPROFLOXACIN HYDROCHLORIDE 3.5 MG/ML
1 SOLUTION/ DROPS TOPICAL 4 TIMES DAILY
Qty: 5 ML | Refills: 0 | Status: SHIPPED | OUTPATIENT
Start: 2025-01-22

## 2025-01-27 DIAGNOSIS — I73.9 PERIPHERAL VASCULAR DISEASE, UNSPECIFIED (HCC): ICD-10-CM

## 2025-01-28 NOTE — TELEPHONE ENCOUNTER
Patient called to request a refill for their clopidogrel advised a refill was requested on 01/27/2025 and is pending approval. Patient verbalized understanding and is in agreement.

## 2025-01-29 RX ORDER — CLOPIDOGREL BISULFATE 75 MG/1
75 TABLET ORAL DAILY
Qty: 90 TABLET | Refills: 1 | Status: SHIPPED | OUTPATIENT
Start: 2025-01-29

## 2025-03-06 ENCOUNTER — HOSPITAL ENCOUNTER (OUTPATIENT)
Dept: NON INVASIVE DIAGNOSTICS | Facility: CLINIC | Age: 85
Discharge: HOME | End: 2025-03-06
Payer: MEDICARE

## 2025-03-06 DIAGNOSIS — I70.213 ATHEROSCLEROSIS OF NATIVE ARTERY OF BOTH LOWER EXTREMITIES WITH INTERMITTENT CLAUDICATION (HCC): ICD-10-CM

## 2025-03-06 PROCEDURE — 93925 LOWER EXTREMITY STUDY: CPT

## 2025-03-06 PROCEDURE — 93923 UPR/LXTR ART STDY 3+ LVLS: CPT

## 2025-03-07 PROCEDURE — 93922 UPR/L XTREMITY ART 2 LEVELS: CPT | Performed by: SURGERY

## 2025-03-07 PROCEDURE — 93925 LOWER EXTREMITY STUDY: CPT | Performed by: SURGERY

## 2025-03-09 ENCOUNTER — RESULTS FOLLOW-UP (OUTPATIENT)
Dept: OTHER | Facility: HOSPITAL | Age: 85
End: 2025-03-09

## 2025-03-22 RX ORDER — GENTAMICIN SULFATE 3 MG/G
OINTMENT OPHTHALMIC
Qty: 7 G | Refills: 0 | OUTPATIENT
Start: 2025-03-22

## 2025-04-29 ENCOUNTER — OFFICE VISIT (OUTPATIENT)
Dept: VASCULAR SURGERY | Facility: CLINIC | Age: 85
End: 2025-04-29
Payer: MEDICARE

## 2025-04-29 VITALS
OXYGEN SATURATION: 98 % | HEIGHT: 65 IN | SYSTOLIC BLOOD PRESSURE: 124 MMHG | BODY MASS INDEX: 29.2 KG/M2 | WEIGHT: 175.3 LBS | DIASTOLIC BLOOD PRESSURE: 84 MMHG | HEART RATE: 67 BPM

## 2025-04-29 DIAGNOSIS — I70.213 ATHEROSCLEROSIS OF NATIVE ARTERY OF BOTH LOWER EXTREMITIES WITH INTERMITTENT CLAUDICATION (HCC): Primary | ICD-10-CM

## 2025-04-29 DIAGNOSIS — I65.23 ASYMPTOMATIC BILATERAL CAROTID ARTERY STENOSIS: ICD-10-CM

## 2025-04-29 DIAGNOSIS — I73.9 PERIPHERAL VASCULAR DISEASE, UNSPECIFIED (HCC): ICD-10-CM

## 2025-04-29 DIAGNOSIS — I87.2 CHRONIC VENOUS INSUFFICIENCY: ICD-10-CM

## 2025-04-29 PROCEDURE — 99214 OFFICE O/P EST MOD 30 MIN: CPT | Performed by: NURSE PRACTITIONER

## 2025-04-29 RX ORDER — CLOPIDOGREL BISULFATE 75 MG/1
75 TABLET ORAL DAILY
Qty: 90 TABLET | Refills: 3 | Status: SHIPPED | OUTPATIENT
Start: 2025-04-29

## 2025-04-29 NOTE — PROGRESS NOTES
Name: Raza De La Rosa      : 1940      MRN: [id number]  Encounter Provider: SERAFIN Yu  Encounter Date: 2025   Encounter department: [facility]  :  Assessment & Plan  Atherosclerosis of native artery of both lower extremities with intermittent claudication (HCC)  85-year-old male with a HTN, HLD, prostate CA, s/p robotic prostatectomy '14, mild carotid stenosis, PAD s/p R distal SFA PTA/stent and proximal AT/TPT/peroneal PTA ' and diagnostics RLEA angiogram ' ([facility]) for instent stenosis and claudication, chronic venous insufficiency s/p R GSV EVLT ' ([facility]) secondary to medial ankle wound    Patient returns to the office to review LEAD 3/6/2025    -LEAD showed right distal CFA/proximal PFA/proximal SFA 50 to 75% stenosis, femoral and popliteal stent with greater than 75% stenosis at mid SFA, 50 to 75% distal SFA and proximal/mid popliteal stenosis, occlusion of TPT trunk and distal PT, right JANICE 0.7/65/54 and left lower extremity diffuse femoral-popliteal disease, left SFA occlusion with distal reconstitution, left proximal popliteal 50 to 75% stenosis, diffuse tibioperoneal disease, left JANICE noncompressible/67/46  BLE fatigue with walking a block to a block and a half, relieved with 1 minute of rest and then walks again.  He walks regularly for exercise 30 minutes+.  Symptoms do not impact his daily activities.  No ischemic rest pain or ischemic tissue loss  Recommended continued surveillance with yearly lead  Continue walking routine  Continue Plavix (refill sent) and statin therapy  Follow-up in the office in 1 year or sooner if change in lower extremity symptoms      Orders:    VAS ARTERIAL DUPLEX- LOWER LIMB BILATERAL; Future    Asymptomatic bilateral carotid artery stenosis  -CV duplex previously showed mild bilateral ICA less than 50% stenosis  -Ordered for duplex for progression of disease  -Asymptomatic from a carotid standpoint  -Continue antiplatelet  and statin therapy  -Follow-up in the office in 1 year  Orders:    VAS carotid complete study; Future    Peripheral vascular disease, unspecified (HCC)    Orders:    clopidogrel (Plavix) 75 mg tablet; Take 1 tablet (75 mg total) by mouth daily    Chronic venous insufficiency  -Chronic venous insufficiency s/p right GSV EVLT  -Wears light OTC compression with good control of lower extremity swelling         History of Present Illness   Raza De La Rosa is a 85 y.o. male who presents to the office to review lower extremity arterial duplex 3/6/2025.  He is accompanied by his wife for visit today.  Previously underwent right lower extremity diagnostic angiogram, no intervention performed, was started on Plavix.  He feels the Plavix is helping him.  He did have 1 episode while out walking tripped on a curb and fractured his nose, significant epistaxis.  No other episodes of major bleeding.  He walks regularly for exercise 30 or more minutes.  After approximately 1-1/2 blocks he has Fatigue and stops to rest for 1 minute and then walk again.  He denies any ischemic rest pain is not experiencing any tissue loss.  He denies any new focal neurological events consistent with TIA/CVA.  He is wearing over-the-counter compression from [employer].  This is an intermediate compression.  Lower extremity swelling well-controlled with compression.    Pt is here for RR of duplex on 03/06/25. Pt gets a bit of stiffness in BLE when he walks, but he takes minute breaks in between.      History obtained from: patient    Review of Systems   Skin:  Negative for color change and wound.   Neurological:  Negative for numbness.     Medical History Reviewed by provider this encounter:  Tobacco  Allergies  Meds  Problems  Med Hx  Surg Hx  Fam Hx     .     Objective   I have reviewed and made appropriate changes to the review of systems input by the medical assistant.    Vitals:    04/29/25 0926   BP: 124/84   BP Location: Left arm   Patient  "Position: Sitting   Cuff Size: Large   Pulse: 67   SpO2: 98%   Weight: 79.5 kg (175 lb 4.8 oz)   Height: 5' 5\" (1.651 m)       Patient Active Problem List   Diagnosis    Inguinal hernia of right side with obstruction    Benign essential hypertension    Prostate cancer (HCC)    Hypercholesterolemia    Peripheral vascular disease, unspecified (HCC)    Chronic venous insufficiency    Carotid stenosis, asymptomatic    Stage 3a chronic kidney disease (HCC)    Atherosclerosis of native artery of both lower extremities with intermittent claudication (HCC)    Fall    Closed dislocation of metacarpophalangeal joint of left thumb    Epistaxis due to trauma    Nasal bone fracture    Atherosclerosis of artery of both lower extremities (HCC)    Glaucoma    White coat syndrome with diagnosis of hypertension       Past Surgical History:   Procedure Laterality Date    COLONOSCOPY      DENTAL SURGERY      HERNIA REPAIR      IR AORTAGRAM WITH RUN-OFF  09/20/2019    IR AORTAGRAM WITH RUN-OFF  11/16/2020    IR LOWER EXTREMITY ANGIOGRAM  10/13/2023    DC ENDOVEN ABLTJ INCMPTNT VEIN XTR LASER 1ST VEIN Right 07/14/2020    Procedure: ENDOVASCULAR LASER THERAPY (EVLT);  Surgeon: El Townsend MD;  Location: [facility];  Service: Vascular    DC RPR 1ST INGUN HRNA AGE 5 YRS/> REDUCIBLE Right 12/07/2016    Procedure: REPAIR HERNIA INGUINAL; HYDROCELECTOMY;  Surgeon: Jason Moise MD;  Location: [facility];  Service: General    DC SLCTV CATHJ 3RD+ ORD SLCTV ABDL PEL/LXTR BRNC Right 11/16/2020    Procedure: ARTERIOGRAM, DIAGNOSTIC AORTO ILIAC, RIGHT LOWER EXTREMITY  ANGIOGRAM, ANGIOPLASTY AND STENTING OF SFA/POPLITEAL, ANGIOPLASTY OF ANTERIOTIBIAL/POPLITEAL AND ATHERECTOMY, ANGIOPLASTY OF TIBIOPERINEAL TRUNK, MYNX CLOSURE;  Surgeon: El Townsend MD;  Location[facility];  Service: Vascular    DC SLCTV CATHJ 3RD+ ORD SLCTV ABDL PEL/LXTR BRNC N/A 10/13/2023    Procedure: right lower extremity arteriogram;  Surgeon: Ralph Turner" MD Yina;  Location: [facility];  Service: Vascular    PROSTATE SURGERY  2014    PROSTATECTOMY RADICAL; LAST ASSESSED: 27OCT2015    SKIN CANCER EXCISION      TRANSURETHRAL RESECTION OF PROSTATE  2011    VASCULAR SURGERY         Family History   Problem Relation Age of Onset    Hypertension Mother     Cancer Father     Kidney failure Family        Social History     Socioeconomic History    Marital status: /Civil Union     Spouse name: Not on file    Number of children: Not on file    Years of education: Not on file    Highest education level: Not on file   Occupational History    Occupation: RETIRED      Employer: [employer]     Comment: FACULTY    Tobacco Use    Smoking status: Never    Smokeless tobacco: Never   Vaping Use    Vaping status: Never Used   Substance and Sexual Activity    Alcohol use: Not Currently    Drug use: Never    Sexual activity: Not on file   Other Topics Concern    Not on file   Social History Narrative    Always uses seat belt    No advance directives     Social Drivers of Health     Financial Resource Strain: Low Risk  (2/23/2024)    Overall Financial Resource Strain (CARDIA)     Difficulty of Paying Living Expenses: Not hard at all   Food Insecurity: No Food Insecurity (11/21/2023)    Nursing - Inadequate Food Risk Classification     Worried About Running Out of Food in the Last Year: Never true     Ran Out of Food in the Last Year: Never true     Ran Out of Food in the Last Year: Not on file   Transportation Needs: No Transportation Needs (2/23/2024)    PRAPARE - Transportation     Lack of Transportation (Medical): No     Lack of Transportation (Non-Medical): No   Physical Activity: Not on file   Stress: Not on file   Social Connections: Not on file   Intimate Partner Violence: Not on file   Housing Stability: Low Risk  (11/21/2023)    Housing Stability Vital Sign     Unable to Pay for Housing in the Last Year: No     Number of Times Moved in the Last Year: 1     Homeless  "in the Last Year: No       Allergies   Allergen Reactions    Other Itching and Rash     SESAME SEEDS,SOB         Current Outpatient Medications:     acetaminophen (TYLENOL) 500 mg tablet, Take 1,000 mg by mouth every 6 (six) hours as needed for mild pain, Disp: , Rfl:     amLODIPine (NORVASC) 2.5 mg tablet, Take 1 tablet (2.5 mg total) by mouth daily, Disp: 100 tablet, Rfl: 1    clopidogrel (Plavix) 75 mg tablet, Take 1 tablet (75 mg total) by mouth daily, Disp: 90 tablet, Rfl: 3    dorzolamide-timolol (COSOPT) 22.3-6.8 MG/ML ophthalmic solution, Administer to both eyes 2 (two) times a day, Disp: , Rfl:     latanoprost (XALATAN) 0.005 % ophthalmic solution, Administer to both eyes daily at bedtime, Disp: , Rfl:     metoprolol succinate (TOPROL-XL) 25 mg 24 hr tablet, Take 1 tablet (25 mg total) by mouth daily, Disp: 100 tablet, Rfl: 1    multivitamin (THERAGRAN) TABS, Take 1 tablet by mouth daily, Disp: , Rfl:     simvastatin (ZOCOR) 20 mg tablet, TAKE 1 TABLET BY MOUTH EVERY DAY, Disp: 100 tablet, Rfl: 1    ciprofloxacin (CILOXAN) 0.3 % ophthalmic solution, Administer 1 drop to both eyes 4 (four) times a day Treat for 3 days., Disp: 5 mL, Rfl: 0    methylPREDNISolone 4 MG tablet therapy pack, Use as directed on package, Disp: 21 each, Rfl: 0   /84 (BP Location: Left arm, Patient Position: Sitting, Cuff Size: Large)   Pulse 67   Ht 5' 5\" (1.651 m)   Wt 79.5 kg (175 lb 4.8 oz)   SpO2 98%   BMI 29.17 kg/m²      Physical Exam  Vitals and nursing note reviewed.   Constitutional:       Appearance: Normal appearance. He is well-developed.   HENT:      Head: Normocephalic and atraumatic.   Eyes:      Extraocular Movements: Extraocular movements intact.   Neck:      Vascular: No carotid bruit.   Cardiovascular:      Pulses:           Carotid pulses are 2+ on the right side and 2+ on the left side.       Dorsalis pedis pulses are 0 on the right side and 0 on the left side.        Posterior tibial pulses are 0 on " the right side and 0 on the left side.      Heart sounds: Normal heart sounds.   Pulmonary:      Effort: Pulmonary effort is normal.   Musculoskeletal:         General: Normal range of motion.      Cervical back: Neck supple.   Skin:     General: Skin is warm.   Neurological:      Mental Status: He is alert and oriented to person, place, and time.   Psychiatric:         Mood and Affect: Mood normal.         Behavior: Behavior normal.         Administrative Statements   I have spent a total time of 35 minutes in caring for this patient on the day of the visit/encounter including Diagnostic results, Prognosis, Risks and benefits of tx options, Instructions for management, Patient and family education, Importance of tx compliance, Risk factor reductions, Impressions, Counseling / Coordination of care, Documenting in the medical record, Reviewing/placing orders in the medical record (including tests, medications, and/or procedures), and Obtaining or reviewing history  .

## 2025-04-29 NOTE — ASSESSMENT & PLAN NOTE
85-year-old male with a HTN, HLD, prostate CA, s/p robotic prostatectomy '14, mild carotid stenosis, PAD s/p R distal SFA PTA/stent and proximal AT/TPT/peroneal PTA '19 and diagnostics RLEA angiogram '23 ([facility]) for instent stenosis and claudication, chronic venous insufficiency s/p R GSV EVLT '20 ([facility]) secondary to medial ankle wound    Patient returns to the office to review LEAD 3/6/2025    -LEAD showed right distal CFA/proximal PFA/proximal SFA 50 to 75% stenosis, femoral and popliteal stent with greater than 75% stenosis at mid SFA, 50 to 75% distal SFA and proximal/mid popliteal stenosis, occlusion of TPT trunk and distal PT, right JANICE 0.7/65/54 and left lower extremity diffuse femoral-popliteal disease, left SFA occlusion with distal reconstitution, left proximal popliteal 50 to 75% stenosis, diffuse tibioperoneal disease, left JANICE noncompressible/67/46  BLE fatigue with walking a block to a block and a half, relieved with 1 minute of rest and then walks again.  He walks regularly for exercise 30 minutes+.  Symptoms do not impact his daily activities.  No ischemic rest pain or ischemic tissue loss  Recommended continued surveillance with yearly lead  Continue walking routine  Continue Plavix (refill sent) and statin therapy  Follow-up in the office in 1 year or sooner if change in lower extremity symptoms      Orders:    VAS ARTERIAL DUPLEX- LOWER LIMB BILATERAL; Future

## 2025-04-29 NOTE — ASSESSMENT & PLAN NOTE
-CV duplex previously showed mild bilateral ICA less than 50% stenosis  -Ordered for duplex for progression of disease  -Asymptomatic from a carotid standpoint  -Continue antiplatelet and statin therapy  -Follow-up in the office in 1 year  Orders:    VAS carotid complete study; Future

## 2025-04-29 NOTE — ASSESSMENT & PLAN NOTE
-Chronic venous insufficiency s/p right GSV EVLT  -Wears light OTC compression with good control of lower extremity swelling

## 2025-05-13 ENCOUNTER — OFFICE VISIT (OUTPATIENT)
Dept: FAMILY MEDICINE CLINIC | Facility: CLINIC | Age: 85
End: 2025-05-13
Payer: MEDICARE

## 2025-05-13 VITALS
OXYGEN SATURATION: 98 % | BODY MASS INDEX: 29.32 KG/M2 | TEMPERATURE: 97.7 F | HEART RATE: 58 BPM | WEIGHT: 176 LBS | SYSTOLIC BLOOD PRESSURE: 132 MMHG | DIASTOLIC BLOOD PRESSURE: 74 MMHG | HEIGHT: 65 IN

## 2025-05-13 DIAGNOSIS — I73.9 PERIPHERAL VASCULAR DISEASE, UNSPECIFIED (HCC): ICD-10-CM

## 2025-05-13 DIAGNOSIS — I10 BENIGN ESSENTIAL HYPERTENSION: ICD-10-CM

## 2025-05-13 DIAGNOSIS — E78.00 HYPERCHOLESTEREMIA: ICD-10-CM

## 2025-05-13 DIAGNOSIS — Z00.00 MEDICARE ANNUAL WELLNESS VISIT, SUBSEQUENT: Primary | ICD-10-CM

## 2025-05-13 PROCEDURE — G0439 PPPS, SUBSEQ VISIT: HCPCS | Performed by: FAMILY MEDICINE

## 2025-05-13 PROCEDURE — G2211 COMPLEX E/M VISIT ADD ON: HCPCS | Performed by: FAMILY MEDICINE

## 2025-05-13 PROCEDURE — 99213 OFFICE O/P EST LOW 20 MIN: CPT | Performed by: FAMILY MEDICINE

## 2025-05-13 RX ORDER — METOPROLOL SUCCINATE 25 MG/1
25 TABLET, EXTENDED RELEASE ORAL DAILY
Qty: 100 TABLET | Refills: 1 | Status: SHIPPED | OUTPATIENT
Start: 2025-05-13

## 2025-05-13 RX ORDER — AMLODIPINE BESYLATE 2.5 MG/1
2.5 TABLET ORAL DAILY
Qty: 100 TABLET | Refills: 1 | Status: SHIPPED | OUTPATIENT
Start: 2025-05-13

## 2025-05-13 RX ORDER — SIMVASTATIN 20 MG
TABLET ORAL
Qty: 100 TABLET | Refills: 1 | Status: SHIPPED | OUTPATIENT
Start: 2025-05-13

## 2025-05-13 NOTE — PATIENT INSTRUCTIONS
Medicare Preventive Visit Patient Instructions  Thank you for completing your Welcome to Medicare Visit or Medicare Annual Wellness Visit today. Your next wellness visit will be due in one year (5/14/2026).  The screening/preventive services that you may require over the next 5-10 years are detailed below. Some tests may not apply to you based off risk factors and/or age. Screening tests ordered at today's visit but not completed yet may show as past due. Also, please note that scanned in results may not display below.  Preventive Screenings:  Service Recommendations Previous Testing/Comments   Colorectal Cancer Screening  Colonoscopy    Fecal Occult Blood Test (FOBT)/Fecal Immunochemical Test (FIT)  Fecal DNA/Cologuard Test  Flexible Sigmoidoscopy Age: 45-75 years old   Colonoscopy: every 10 years (May be performed more frequently if at higher risk)  OR  FOBT/FIT: every 1 year  OR  Cologuard: every 3 years  OR  Sigmoidoscopy: every 5 years  Screening may be recommended earlier than age 45 if at higher risk for colorectal cancer. Also, an individualized decision between you and your healthcare provider will decide whether screening between the ages of 76-85 would be appropriate. Colonoscopy: Not on file  FOBT/FIT: Not on file  Cologuard: Not on file  Sigmoidoscopy: Not on file          Prostate Cancer Screening Individualized decision between patient and health care provider in men between ages of 55-69   Medicare will cover every 12 months beginning on the day after your 50th birthday PSA: <0.01 ng/mL           Hepatitis C Screening Once for adults born between 1945 and 1965  More frequently in patients at high risk for Hepatitis C Hep C Antibody: 07/14/2020        Diabetes Screening 1-2 times per year if you're at risk for diabetes or have pre-diabetes Fasting glucose: 109 mg/dL (11/5/2024)  A1C: No results in last 5 years (No results in last 5 years)      Cholesterol Screening Once every 5 years if you don't have  a lipid disorder. May order more often based on risk factors. Lipid panel: 11/05/2024         Other Preventive Screenings Covered by Medicare:  Abdominal Aortic Aneurysm (AAA) Screening: covered once if your at risk. You're considered to be at risk if you have a family history of AAA or a male between the age of 65-75 who smoking at least 100 cigarettes in your lifetime.  Lung Cancer Screening: covers low dose CT scan once per year if you meet all of the following conditions: (1) Age 55-77; (2) No signs or symptoms of lung cancer; (3) Current smoker or have quit smoking within the last 15 years; (4) You have a tobacco smoking history of at least 20 pack years (packs per day x number of years you smoked); (5) You get a written order from a healthcare provider.  Glaucoma Screening: covered annually if you're considered high risk: (1) You have diabetes OR (2) Family history of glaucoma OR (3)  aged 50 and older OR (4)  American aged 65 and older  Osteoporosis Screening: covered every 2 years if you meet one of the following conditions: (1) Have a vertebral abnormality; (2) On glucocorticoid therapy for more than 3 months; (3) Have primary hyperparathyroidism; (4) On osteoporosis medications and need to assess response to drug therapy.  HIV Screening: covered annually if you're between the age of 15-65. Also covered annually if you are younger than 15 and older than 65 with risk factors for HIV infection. For pregnant patients, it is covered up to 3 times per pregnancy.    Immunizations:  Immunization Recommendations   Influenza Vaccine Annual influenza vaccination during flu season is recommended for all persons aged >= 6 months who do not have contraindications   Pneumococcal Vaccine   * Pneumococcal conjugate vaccine = PCV13 (Prevnar 13), PCV15 (Vaxneuvance), PCV20 (Prevnar 20)  * Pneumococcal polysaccharide vaccine = PPSV23 (Pneumovax) Adults 19-63 yo with certain risk factors or if 65+  yo  If never received any pneumonia vaccine: recommend Prevnar 20 (PCV20)  Give PCV20 if previously received 1 dose of PCV13 or PPSV23   Hepatitis B Vaccine 3 dose series if at intermediate or high risk (ex: diabetes, end stage renal disease, liver disease)   Respiratory syncytial virus (RSV) Vaccine - COVERED BY MEDICARE PART D  * RSVPreF3 (Arexvy) CDC recommends that adults 60 years of age and older may receive a single dose of RSV vaccine using shared clinical decision-making (SCDM)   Tetanus (Td) Vaccine - COST NOT COVERED BY MEDICARE PART B Following completion of primary series, a booster dose should be given every 10 years to maintain immunity against tetanus. Td may also be given as tetanus wound prophylaxis.   Tdap Vaccine - COST NOT COVERED BY MEDICARE PART B Recommended at least once for all adults. For pregnant patients, recommended with each pregnancy.   Shingles Vaccine (Shingrix) - COST NOT COVERED BY MEDICARE PART B  2 shot series recommended in those 19 years and older who have or will have weakened immune systems or those 50 years and older     Health Maintenance Due:      Topic Date Due   • Colorectal Cancer Screening  Never done   • Hepatitis C Screening  Completed     Immunizations Due:  There are no preventive care reminders to display for this patient.  Advance Directives   What are advance directives?  Advance directives are legal documents that state your wishes and plans for medical care. These plans are made ahead of time in case you lose your ability to make decisions for yourself. Advance directives can apply to any medical decision, such as the treatments you want, and if you want to donate organs.   What are the types of advance directives?  There are many types of advance directives, and each state has rules about how to use them. You may choose a combination of any of the following:  Living will:  This is a written record of the treatment you want. You can also choose which  treatments you do not want, which to limit, and which to stop at a certain time. This includes surgery, medicine, IV fluid, and tube feedings.   Durable power of  for healthcare (DPAHC):  This is a written record that states who you want to make healthcare choices for you when you are unable to make them for yourself. This person, called a proxy, is usually a family member or a friend. You may choose more than 1 proxy.  Do not resuscitate (DNR) order:  A DNR order is used in case your heart stops beating or you stop breathing. It is a request not to have certain forms of treatment, such as CPR. A DNR order may be included in other types of advance directives.  Medical directive:  This covers the care that you want if you are in a coma, near death, or unable to make decisions for yourself. You can list the treatments you want for each condition. Treatment may include pain medicine, surgery, blood transfusions, dialysis, IV or tube feedings, and a ventilator (breathing machine).  Values history:  This document has questions about your views, beliefs, and how you feel and think about life. This information can help others choose the care that you would choose.  Why are advance directives important?  An advance directive helps you control your care. Although spoken wishes may be used, it is better to have your wishes written down. Spoken wishes can be misunderstood, or not followed. Treatments may be given even if you do not want them. An advance directive may make it easier for your family to make difficult choices about your care.   Weight Management   Why it is important to manage your weight:  Being overweight increases your risk of health conditions such as heart disease, high blood pressure, type 2 diabetes, and certain types of cancer. It can also increase your risk for osteoarthritis, sleep apnea, and other respiratory problems. Aim for a slow, steady weight loss. Even a small amount of weight loss can  lower your risk of health problems.  How to lose weight safely:  A safe and healthy way to lose weight is to eat fewer calories and get regular exercise. You can lose up about 1 pound a week by decreasing the number of calories you eat by 500 calories each day.   Healthy meal plan for weight management:  A healthy meal plan includes a variety of foods, contains fewer calories, and helps you stay healthy. A healthy meal plan includes the following:  Eat whole-grain foods more often.  A healthy meal plan should contain fiber. Fiber is the part of grains, fruits, and vegetables that is not broken down by your body. Whole-grain foods are healthy and provide extra fiber in your diet. Some examples of whole-grain foods are whole-wheat breads and pastas, oatmeal, brown rice, and bulgur.  Eat a variety of vegetables every day.  Include dark, leafy greens such as spinach, kale, marcie greens, and mustard greens. Eat yellow and orange vegetables such as carrots, sweet potatoes, and winter squash.   Eat a variety of fruits every day.  Choose fresh or canned fruit (canned in its own juice or light syrup) instead of juice. Fruit juice has very little or no fiber.  Eat low-fat dairy foods.  Drink fat-free (skim) milk or 1% milk. Eat fat-free yogurt and low-fat cottage cheese. Try low-fat cheeses such as mozzarella and other reduced-fat cheeses.  Choose meat and other protein foods that are low in fat.  Choose beans or other legumes such as split peas or lentils. Choose fish, skinless poultry (chicken or turkey), or lean cuts of red meat (beef or pork). Before you cook meat or poultry, cut off any visible fat.   Use less fat and oil.  Try baking foods instead of frying them. Add less fat, such as margarine, sour cream, regular salad dressing and mayonnaise to foods. Eat fewer high-fat foods. Some examples of high-fat foods include french fries, doughnuts, ice cream, and cakes.  Eat fewer sweets.  Limit foods and drinks that are  high in sugar. This includes candy, cookies, regular soda, and sweetened drinks.  Exercise:  Exercise at least 30 minutes per day on most days of the week. Some examples of exercise include walking, biking, dancing, and swimming. You can also fit in more physical activity by taking the stairs instead of the elevator or parking farther away from stores. Ask your healthcare provider about the best exercise plan for you.      © Copyright [employer] 2018 Information is for End User's use only and may not be sold, redistributed or otherwise used for commercial purposes. All illustrations and images included in CareNotes® are the copyrighted property of [employer] or [employer]

## 2025-05-13 NOTE — PROGRESS NOTES
Name: Raza De La Rosa      : 1940      MRN: [id number]  Encounter Provider: Segundo Choi DO  Encounter Date: 2025   Encounter department: [facility]  :  Assessment & Plan  Medicare annual wellness visit, subsequent         Benign essential hypertension    Orders:    amLODIPine (NORVASC) 2.5 mg tablet; Take 1 tablet (2.5 mg total) by mouth daily    metoprolol succinate (TOPROL-XL) 25 mg 24 hr tablet; Take 1 tablet (25 mg total) by mouth daily    Lipid panel; Future    Hepatic function panel; Future    CBC and Platelet; Future    Basic metabolic panel; Future    Peripheral vascular disease, unspecified (HCC)         Hypercholesteremia    Orders:    simvastatin (ZOCOR) 20 mg tablet; TAKE 1 TABLET BY MOUTH EVERY DAY       Preventive health issues were discussed with patient, and age appropriate screening tests were ordered as noted in patient's After Visit Summary. Personalized health advice and appropriate referrals for health education or preventive services given if needed, as noted in patient's After Visit Summary.    History of Present Illness     Labs, refills.  Good day walking is 5,000 steps.  Walks a block, stops for one minute and repeats up to 5,000 steps.       Patient Care Team:  Segundo Choi DO as PCP - General  MD Jered Garvin MD (Inactive)  DO Juan C Valdivia MD (Ophthalmology)    Review of Systems   Constitutional: Negative.    HENT: Negative.     Eyes: Negative.    Respiratory: Negative.     Cardiovascular: Negative.    Gastrointestinal: Negative.    Genitourinary: Negative.    Musculoskeletal: Negative.    Skin: Negative.    Neurological: Negative.    Psychiatric/Behavioral: Negative.       Medical History Reviewed by provider this encounter:       Annual Wellness Visit Questionnaire   Raza is here for his Subsequent Wellness visit.     Health Risk Assessment:   Patient rates overall health as very good. Patient feels that their  physical health rating is same. Patient is satisfied with their life. Eyesight was rated as same. Hearing was rated as slightly worse. Patient feels that their emotional and mental health rating is same. Patients states they are never, rarely angry. Patient states they are sometimes unusually tired/fatigued. Pain experienced in the last 7 days has been some. Patient's pain rating has been 2/10. Patient states that he has experienced no weight loss or gain in last 6 months.     Fall Risk Screening:   In the past year, patient has experienced: no history of falling in past year      Home Safety:  Patient does not have trouble with stairs inside or outside of their home. Patient has working smoke alarms and has working carbon monoxide detector. Home safety hazards include: none.     Nutrition:   Current diet is Regular.     Medications:   Patient is currently taking over-the-counter supplements. OTC medications include: see medication list. Patient is able to manage medications.     Activities of Daily Living (ADLs)/Instrumental Activities of Daily Living (IADLs):   Walk and transfer into and out of bed and chair?: Yes  Dress and groom yourself?: Yes    Bathe or shower yourself?: Yes    Feed yourself? Yes  Do your laundry/housekeeping?: Yes  Manage your money, pay your bills and track your expenses?: Yes  Make your own meals?: Yes    Do your own shopping?: Yes    Durable Medical Equipment Suppliers  none    Previous Hospitalizations:   Any hospitalizations or ED visits within the last 12 months?: No      Advance Care Planning:   Living will: No    Durable POA for healthcare: Yes    Advanced directive: No      Cognitive Screening:   Provider or family/friend/caregiver concerned regarding cognition?: No    Preventive Screenings      Cardiovascular Screening:    General: Screening Not Indicated and History Lipid Disorder      Diabetes Screening:     General: Screening Current      Colorectal Cancer Screening:     General:  Screening Not Indicated      Prostate Cancer Screening:    General: History Prostate Cancer and Screening Not Indicated      Osteoporosis Screening:    General: Screening Not Indicated      Abdominal Aortic Aneurysm (AAA) Screening:        General: Screening Not Indicated      Lung Cancer Screening:     General: Screening Not Indicated      Hepatitis C Screening:    General: Screening Current    Immunizations:  - Immunizations due: Zoster (Shingrix)    Screening, Brief Intervention, and Referral to Treatment (SBIRT)     Screening  Typical number of drinks in a day: 0  Typical number of drinks in a week: 0  Interpretation: Low risk drinking behavior.    AUDIT-C Screenin) How often did you have a drink containing alcohol in the past year? monthly or less  2) How many drinks did you have on a typical day when you were drinking in the past year? 0  3) How often did you have 6 or more drinks on one occasion in the past year? never    AUDIT-C Score: 1  Interpretation: Score 0-3 (male): Negative screen for alcohol misuse    Single Item Drug Screening:  How often have you used an illegal drug (including marijuana) or a prescription medication for non-medical reasons in the past year? never    Single Item Drug Screen Score: 0  Interpretation: Negative screen for possible drug use disorder    Social Drivers of Health     Financial Resource Strain: Low Risk  (2024)    Overall Financial Resource Strain (CARDIA)     Difficulty of Paying Living Expenses: Not hard at all   Food Insecurity: No Food Insecurity (2025)    Hunger Vital Sign     Worried About Running Out of Food in the Last Year: Never true     Ran Out of Food in the Last Year: Never true   Transportation Needs: No Transportation Needs (2025)    PRAPARE - Transportation     Lack of Transportation (Medical): No     Lack of Transportation (Non-Medical): No   Housing Stability: Low Risk  (2025)    Housing Stability Vital Sign     Unable to Pay for  "Housing in the Last Year: No     Number of Times Moved in the Last Year: 0     Homeless in the Last Year: No   Utilities: Not At Risk (5/12/2025)    [facility] Utilities     Threatened with loss of utilities: No     No results found.    Objective   /74 (BP Location: Left arm, Patient Position: Sitting, Cuff Size: Standard)   Pulse 58   Temp 97.7 °F (36.5 °C) (Temporal)   Ht 5' 5\" (1.651 m)   Wt 79.8 kg (176 lb)   SpO2 98%   BMI 29.29 kg/m²     Physical Exam  Constitutional:       Appearance: He is well-developed.   HENT:      Head: Normocephalic and atraumatic.      Right Ear: External ear normal.      Left Ear: External ear normal.      Nose: Nose normal.      Mouth/Throat:      Mouth: Mucous membranes are moist.      Pharynx: Oropharynx is clear.   Eyes:      Conjunctiva/sclera: Conjunctivae normal.      Pupils: Pupils are equal, round, and reactive to light.   Cardiovascular:      Rate and Rhythm: Normal rate and regular rhythm.      Heart sounds: Normal heart sounds.   Pulmonary:      Effort: Pulmonary effort is normal.      Breath sounds: Normal breath sounds.   Abdominal:      General: Bowel sounds are normal.      Palpations: Abdomen is soft.   Musculoskeletal:      Cervical back: Normal range of motion and neck supple.   Skin:     General: Skin is warm and dry.   Neurological:      Mental Status: He is alert and oriented to person, place, and time.      Deep Tendon Reflexes: Reflexes are normal and symmetric.   Psychiatric:         Mood and Affect: Mood normal.         Behavior: Behavior normal.         Thought Content: Thought content normal.         Judgment: Judgment normal.         "

## 2025-05-13 NOTE — ASSESSMENT & PLAN NOTE
Orders:    amLODIPine (NORVASC) 2.5 mg tablet; Take 1 tablet (2.5 mg total) by mouth daily    metoprolol succinate (TOPROL-XL) 25 mg 24 hr tablet; Take 1 tablet (25 mg total) by mouth daily    Lipid panel; Future    Hepatic function panel; Future    CBC and Platelet; Future    Basic metabolic panel; Future

## (undated) DEVICE — 3M™ STERI-STRIP™ REINFORCED ADHESIVE SKIN CLOSURES, R1546, 1/4 IN X 4 IN (6 MM X 100 MM), 10 STRIPS/ENVELOPE: Brand: 3M™ STERI-STRIP™

## (undated) DEVICE — COVER PROBE INTRAOPERATIVE 6 X 96 IN

## (undated) DEVICE — GLOVE SRG BIOGEL 8

## (undated) DEVICE — IV SET 15 DROP STERILE 0/Y GRAVITY

## (undated) DEVICE — NON-DEHP HIGH FLOW RATE EXTENSION SET, MALE LUER LOCK ADAPTER

## (undated) DEVICE — Device

## (undated) DEVICE — DESTINATION PERIPHERAL GUIDING SHEATH: Brand: DESTINATION

## (undated) DEVICE — RADIFOCUS TORQUE DEVICE MULTI-TORQUE VISE: Brand: RADIFOCUS TORQUE DEVICE

## (undated) DEVICE — INTENDED FOR TISSUE SEPARATION, AND OTHER PROCEDURES THAT REQUIRE A SHARP SURGICAL BLADE TO PUNCTURE OR CUT.: Brand: BARD-PARKER SAFETY BLADES SIZE 11, STERILE

## (undated) DEVICE — ULTRASOUND GEL STERILE FOIL PK

## (undated) DEVICE — CATH DIAG 5FR .035 65CM 6S OMMI-FLUSH

## (undated) DEVICE — ADHESIVE SKIN HIGH VISCOSITY EXOFIN 1ML

## (undated) DEVICE — SYRINGE 20ML LL

## (undated) DEVICE — CATH DIAG 5FR .035 70CM PIG CSC 20

## (undated) DEVICE — SGW STORQ .035 300CM ANGLE STD: Brand: STORQ

## (undated) DEVICE — NEEDLE SPINAL 20G X 3.5 LF

## (undated) DEVICE — DRAPE SHEET THREE QUARTER

## (undated) DEVICE — RADIFOCUS GLIDECATH: Brand: GLIDECATH

## (undated) DEVICE — CHLORAPREP HI-LITE 10.5ML ORANGE

## (undated) DEVICE — GLOVE INDICATOR PI UNDERGLOVE SZ 8.5 BLUE

## (undated) DEVICE — MICROPUNCTURE INTRODUCER SET SILHOUETTE TRANSITIONLESS PUSH-PLUS DESIGN - STIFFENED CANNULA WITH NITINOL WIRE GUIDE: Brand: MICROPUNCTURE

## (undated) DEVICE — FLUID MANAGEMENT KIT - IR

## (undated) DEVICE — SNAP KOVER: Brand: UNBRANDED

## (undated) DEVICE — FIBER PROC KIT GOLD TIP 21G 45CM NEVERTOUCH

## (undated) DEVICE — SYRINGE KIT,PACKAGED,,150FT,MK 7(ANGIO-ARTERION, 150ML SYR KIT W/QFT,MC)(60729385): Brand: MEDRAD® MARK 7 ARTERION DISPOSABLE SYRINGE 150 ML WITH QUICK FILL TUBE

## (undated) DEVICE — STOPCOCK 3-WAY

## (undated) DEVICE — CATH BAL COYOTE ES OTW 3 X 40MM X 145CM

## (undated) DEVICE — TIBURON SPLIT SHEET: Brand: CONVERTORS

## (undated) DEVICE — DRAPE SHEET X-LG

## (undated) DEVICE — KERLIX BANDAGE ROLL: Brand: KERLIX

## (undated) DEVICE — 3M™ TEGADERM™ TRANSPARENT FILM DRESSING FRAME STYLE, 1626W, 4 IN X 4-3/4 IN (10 CM X 12 CM), 50/CT 4CT/CASE: Brand: 3M™ TEGADERM™

## (undated) DEVICE — FLEXCIL HIGH PRESSURE CONTRAST INJECTION LINE: Brand: NAMIC

## (undated) DEVICE — ACE WRAP 4 IN STERILE

## (undated) DEVICE — STERILE ICS CARDIOVASCULAR PK: Brand: CARDINAL HEALTH

## (undated) DEVICE — GLOVE INDICATOR PI UNDERGLOVE SZ 7.5 BLUE

## (undated) DEVICE — CHLORAPREP HI-LITE 26ML ORANGE

## (undated) DEVICE — INTENDED FOR TISSUE SEPARATION, AND OTHER PROCEDURES THAT REQUIRE A SHARP SURGICAL BLADE TO PUNCTURE OR CUT.: Brand: BARD-PARKER SAFETY BLADES SIZE 15, STERILE

## (undated) DEVICE — Device: Brand: MEDEX

## (undated) DEVICE — 3000CC GUARDIAN II: Brand: GUARDIAN

## (undated) DEVICE — 1200CC GUARDIAN II: Brand: GUARDIAN

## (undated) DEVICE — 3M™ STERI-STRIP™ REINFORCED ADHESIVE SKIN CLOSURES, R1547, 1/2 IN X 4 IN (12 MM X 100 MM), 6 STRIPS/ENVELOPE: Brand: 3M™ STERI-STRIP™

## (undated) DEVICE — ACE WRAP 6 IN STERILE

## (undated) DEVICE — PRESTO™ INFLATION DEVICE: Brand: PRESTO

## (undated) DEVICE — RADIFOCUS GLIDEWIRE ADVANTAGE GUIDEWIRE: Brand: GLIDEWIRE ADVANTAGE

## (undated) DEVICE — GAUZE SPONGES,16 PLY: Brand: CURITY

## (undated) DEVICE — GLOVE SRG BIOGEL 7

## (undated) DEVICE — BETHLEHEM UNIVERSAL MINOR GEN: Brand: CARDINAL HEALTH

## (undated) DEVICE — HYDROPHILIC WOUND DRESSING WITH ZINC PLUS VITAMINS A AND B6.: Brand: DERMAGRAN®-B

## (undated) DEVICE — PINNACLE R/O II INTRODUCER SHEATH WITH RADIOPAQUE MARKER: Brand: PINNACLE

## (undated) DEVICE — PROBE COVER: Brand: STERILE PROBE COVER

## (undated) DEVICE — INFUSER BAG 500ML

## (undated) DEVICE — TONGUE DEPRESSOR STERILE

## (undated) DEVICE — RADIFOCUS GLIDEWIRE: Brand: GLIDEWIRE

## (undated) DEVICE — F5 TEMPO 65 CM 0.038 INCHESGW: Brand: TEMPO